# Patient Record
Sex: MALE | Race: WHITE | NOT HISPANIC OR LATINO | Employment: OTHER | URBAN - METROPOLITAN AREA
[De-identification: names, ages, dates, MRNs, and addresses within clinical notes are randomized per-mention and may not be internally consistent; named-entity substitution may affect disease eponyms.]

---

## 2017-09-25 ENCOUNTER — OFFICE VISIT (OUTPATIENT)
Dept: FAMILY MEDICINE | Facility: CLINIC | Age: 82
End: 2017-09-25
Payer: COMMERCIAL

## 2017-09-25 ENCOUNTER — CARE COORDINATION (OUTPATIENT)
Dept: CARE COORDINATION | Facility: CLINIC | Age: 82
End: 2017-09-25

## 2017-09-25 VITALS
SYSTOLIC BLOOD PRESSURE: 126 MMHG | WEIGHT: 157.1 LBS | BODY MASS INDEX: 23.27 KG/M2 | HEIGHT: 69 IN | HEART RATE: 60 BPM | DIASTOLIC BLOOD PRESSURE: 90 MMHG | TEMPERATURE: 96.7 F | OXYGEN SATURATION: 97 %

## 2017-09-25 DIAGNOSIS — Z00.01 ENCOUNTER FOR ROUTINE ADULT MEDICAL EXAM WITH ABNORMAL FINDINGS: Primary | ICD-10-CM

## 2017-09-25 DIAGNOSIS — Z23 NEED FOR PROPHYLACTIC VACCINATION AND INOCULATION AGAINST INFLUENZA: ICD-10-CM

## 2017-09-25 DIAGNOSIS — I10 HYPERTENSION GOAL BP (BLOOD PRESSURE) < 140/90: ICD-10-CM

## 2017-09-25 DIAGNOSIS — F03.B0 MODERATE DEMENTIA WITHOUT BEHAVIORAL DISTURBANCE (H): ICD-10-CM

## 2017-09-25 PROCEDURE — 90662 IIV NO PRSV INCREASED AG IM: CPT | Performed by: FAMILY MEDICINE

## 2017-09-25 PROCEDURE — 99397 PER PM REEVAL EST PAT 65+ YR: CPT | Mod: 25 | Performed by: FAMILY MEDICINE

## 2017-09-25 PROCEDURE — 99213 OFFICE O/P EST LOW 20 MIN: CPT | Mod: 25 | Performed by: FAMILY MEDICINE

## 2017-09-25 PROCEDURE — 36415 COLL VENOUS BLD VENIPUNCTURE: CPT | Performed by: FAMILY MEDICINE

## 2017-09-25 PROCEDURE — G0008 ADMIN INFLUENZA VIRUS VAC: HCPCS | Performed by: FAMILY MEDICINE

## 2017-09-25 PROCEDURE — 80048 BASIC METABOLIC PNL TOTAL CA: CPT | Performed by: FAMILY MEDICINE

## 2017-09-25 RX ORDER — LOSARTAN POTASSIUM 50 MG/1
50 TABLET ORAL DAILY
Qty: 90 TABLET | Refills: 3 | Status: SHIPPED | OUTPATIENT
Start: 2017-09-25 | End: 2018-09-19

## 2017-09-25 RX ORDER — CHLORTHALIDONE 25 MG/1
12.5 TABLET ORAL DAILY
Qty: 45 TABLET | Refills: 3 | Status: SHIPPED | OUTPATIENT
Start: 2017-09-25 | End: 2018-09-19

## 2017-09-25 RX ORDER — MEMANTINE HYDROCHLORIDE 5 MG/1
5 TABLET ORAL 2 TIMES DAILY
Qty: 180 TABLET | Refills: 3 | Status: SHIPPED | OUTPATIENT
Start: 2017-09-25 | End: 2018-09-19

## 2017-09-25 NOTE — PATIENT INSTRUCTIONS
-Start on the namenda twice daily if possible to help reduce the memory loss.    Preventive Health Recommendations:   Male Ages 65 and over    Yearly exam:             See your health care provider every year in order to  o   Review health changes.   o   Discuss preventive care.    o   Review your medicines if your doctor has prescribed any.    Talk with your health care provider about whether you should have a test to screen for prostate cancer (PSA).    Every 3 years, have a diabetes test (fasting glucose). If you are at risk for diabetes, you should have this test more often.    Every 5 years, have a cholesterol test. Have this test more often if you are at risk for high cholesterol or heart disease.     Every 10 years, have a colonoscopy. Or, have a yearly FIT test (stool test). These exams will check for colon cancer.    Talk to with your health care provider about screening for Abdominal Aortic Aneurysm if you have a family history of AAA or have a history of smoking.    Shots:     Get a flu shot each year.     Get a tetanus shot every 10 years.     Talk to your doctor about your pneumonia vaccines. There are now two you should receive - Pneumovax (PPSV 23) and Prevnar (PCV 13).     Talk to your doctor about a shingles vaccine.     Talk to your doctor about the hepatitis B vaccine.  Nutrition:     Eat at least 5 servings of fruits and vegetables each day.     Eat whole-grain bread, whole-wheat pasta and brown rice instead of white grains and rice.     Talk to your provider about Calcium and Vitamin D.   Lifestyle    Exercise for at least 150 minutes a week (30 minutes a day, 5 days a week). This will help you control your weight and prevent disease.     Limit alcohol to one drink per day.     No smoking.     Wear sunscreen to prevent skin cancer.     See your dentist every six months for an exam and cleaning.     See your eye doctor every 1 to 2 years to screen for conditions such as glaucoma, macular  degeneration, cataracts, etc

## 2017-09-25 NOTE — PROGRESS NOTES
Clinic Care Coordination Contact  [facility]/Voicemail    Referral Source: PCP  Clinical Data: Care Coordinator Outreach  Outreach attempted x 1.  Left message on voicemail with call back information and requested return call.  Plan:  Care Coordinator will try to reach patient again in 3-5 business days.  SARAI Najera    Care Coordinator  [facility], [facility], and Women's   [phone]

## 2017-09-25 NOTE — LETTER
October 2, 2017      Wilian Vallejo  [address] [address]      Dear ,    We are writing to inform you of your test results. Your test results fall within the expected range(s) or remain unchanged from previous results.     Resulted Orders   Basic metabolic panel   Result Value Ref Range    Sodium 139 133 - 144 mmol/L    Potassium 4.2 3.4 - 5.3 mmol/L    Chloride 105 94 - 109 mmol/L    Carbon Dioxide 28 20 - 32 mmol/L    Anion Gap 6 3 - 14 mmol/L    Glucose 93 70 - 99 mg/dL    Urea Nitrogen 19 7 - 30 mg/dL    Creatinine 1.04 0.66 - 1.25 mg/dL    GFR Estimate 68 >60 mL/min/1.7m2      Comment:      Non  GFR Calc    GFR Estimate If Black 82 >60 mL/min/1.7m2      Comment:       GFR Calc    Calcium 8.8 8.5 - 10.1 mg/dL       If you have any questions or concerns, please call the clinic at the number listed above.       Sincerely,        Nixon Ndiaye MD

## 2017-09-25 NOTE — PROGRESS NOTES
SUBJECTIVE:   CC: Wilian Vallejo is an 84 year old male who presents for preventative health visit.     Patient says that he is doing well and has no concerns. He goes on several walks a day, and has been getting regular exercise through walks and swinging a golf club. His memory losses continue to progress, and he cannot remember whether he continues to play music or his day to day tasks.     Patient has not been having regular eye exams, but reports no changes in his vision.    Healthy Habits:    Do you get at least three servings of calcium containing foods daily (dairy, green leafy vegetables, etc.)? yes    Amount of exercise or daily activities, outside of work: 7 day(s) per week    Problems taking medications regularly No    Medication side effects: No    Have you had an eye exam in the past two years? no    Do you see a dentist twice per year? yes    Do you have sleep apnea, excessive snoring or daytime drowsiness?no    Questions/Concerns: Senior program- wondering if there is any paper work to fill out for more assistance.       Today's PHQ-2 Score:   PHQ-2 ( 1999 Pf[employer]) 9/25/2017 9/7/2016   Q1: Little interest or pleasure in doing things 0 0   Q2: Feeling down, depressed or hopeless 0 0   PHQ-2 Score 0 0         Abuse: Current or Past(Physical, Sexual or Emotional)- No  Do you feel safe in your environment - Yes  Social History   Substance Use Topics     Smoking status: Never Smoker     Smokeless tobacco: Never Used     Alcohol use Yes      Comment: 1-2/wk/no alcohol 24 hrs prior to arrival     The patient does not drink >3 drinks per day nor >7 drinks per week.    Last PSA:   PSA   Date Value Ref Range Status   04/29/2009 7.0 ng/mL        Reviewed orders with patient. Reviewed health maintenance and updated orders accordingly - Yes  BP Readings from Last 3 Encounters:   09/25/17 126/90   09/07/16 120/82   09/04/15 129/87    Wt Readings from Last 3 Encounters:   09/25/17 157 lb 1.6 oz (71.3 kg)    09/07/16 156 lb 4.8 oz (70.9 kg)   09/04/15 155 lb 1.6 oz (70.4 kg)        Patient Active Problem List   Diagnosis     Advanced directives, counseling/discussion     Tinnitus     CARDIOVASCULAR SCREENING; LDL GOAL LESS THAN 130     Hernia     HL (hearing loss)     Urinary retention     BPH (benign prostatic hypertrophy)     BPH (benign prostatic hyperplasia)     S/P TURP     Essential hypertension with goal blood pressure less than 140/90     Family history of Parkinson's disease     Moderate dementia without behavioral disturbance     Past Surgical History:   Procedure Laterality Date     COLONOSCOPY       CYSTOSCOPY, FULGURATE BLEEDERS, EVACUATE CLOT(S), COMBINED  7/2/2013    Procedure: COMBINED CYSTOSCOPY, FULGURATE BLEEDERS, EVACUATE CLOT(S);  Ridgid CYSTOSCOPY, FULGURATE Bladder, Clot Evacuation;  Surgeon: Anderson Goins MD;  [facility]     CYSTOSCOPY, TRANSURETHRAL RESECTION (TUR) PROSTATE, COMBINED  7/2/2013    Procedure: COMBINED CYSTOSCOPY, TRANSURETHRAL RESECTION (TUR) PROSTATE;  Transurethral Resection of Prostate   ;  Surgeon: Allan De La Rosa MD;  [facility]     EYE SURGERY       LAPAROSCOPIC HERNIORRHAPHY INGUINAL BILATERAL  4/19/2013    Procedure: LAPAROSCOPIC HERNIORRHAPHY INGUINAL BILATERAL;  Laparosocpic Bilateral Inguinal Hernia Repair With Mesh;  Surgeon: Jon Ames MD;  Location:  OR       Social History   Substance Use Topics     Smoking status: Never Smoker     Smokeless tobacco: Never Used     Alcohol use Yes      Comment: 1-2/wk/no alcohol 24 hrs prior to arrival     Family History   Problem Relation Age of Onset     Hypertension Mother      HEART DISEASE Mother      Cancer - colorectal Maternal Grandmother      HEART DISEASE Brother      Respiratory Brother          Current Outpatient Prescriptions   Medication Sig Dispense Refill     losartan (COZAAR) 50 MG tablet Take 1 tablet (50 mg) by mouth daily 90 tablet 3     chlorthalidone (HYGROTON) 25 MG  "tablet Take 0.5 tablets (12.5 mg) by mouth daily 45 tablet 3     memantine (NAMENDA) 5 MG tablet Take 1 tablet (5 mg) by mouth 2 times daily 180 tablet 3     FLUZONE HIGH-DOSE injection   0     Allergies   Allergen Reactions     Hydrocodone W/Acetaminophen [Hydrocodone-Acetaminophen]            Reviewed and updated as needed this visit by clinical staff  Tobacco  Allergies  Meds  Med Hx  Surg Hx  Fam Hx  Soc Hx        Reviewed and updated as needed this visit by Provider              ROS:  Positive for memory loss.    Denies headache, insomnia, chest pain, shortness of breath, cough, heartburn, bowel issues, bladder issues, neck pain, back pain, hip pain, knee pain, ankle pain, or foot pain. Remainder of ROS is negative unless otherwise noted above or in HPI.    This document serves as a record of the services and decisions personally performed and made by Nixon Ndiaye MD. It was created on his behalf by Ivan Cerda, a trained medical scribe. The creation of this document is based the provider's statements to the medical scribe.  Ivan Cerda 1:13 PM September 25, 2017    OBJECTIVE:   /90  Pulse 60  Temp 96.7  F (35.9  C) (Oral)  Ht 5' 8.5\" (1.74 m)  Wt 157 lb 1.6 oz (71.3 kg)  SpO2 97%  BMI 23.54 kg/m2  EXAM:  GENERAL: healthy, alert and no distress  EYES: Eyes grossly normal to inspection, PERRL and conjunctivae and sclerae normal. EOMI.  HENT: ear canals and TM's normal, nose and mouth without ulcers or lesions  NECK: no adenopathy, no asymmetry, masses, or scars and thyroid normal to palpation  RESP: lungs clear to auscultation - no rales, rhonchi or wheezes  CV: regular rate and rhythm, normal S1 S2, no S3 or S4, no murmur, click or rub, no peripheral edema and peripheral pulses strong  ABDOMEN: soft, nontender, no hepatosplenomegaly, no masses and bowel sounds normal   (male): normal male genitalia without lesions or urethral discharge, no hernia  MS: no gross " musculoskeletal defects noted, no edema. Calves nontender.  SKIN: no suspicious lesions or rashes  NEURO: Normal strength and tone, mentation intact and speech normal, forgetful. Knee reflexes normal. Fine tremors of both hands.  PSYCH: mentation appears normal, affect normal/bright  LYMPH: no cervical, supraclavicular, axillary, or inguinal adenopathy    ASSESSMENT/PLAN:   (Z00.01) Encounter for routine adult medical exam with abnormal findings  (primary encounter diagnosis)  Comment: Routine physical and labs completed today.  Plan: Follow up with results from lab.    (I10) Hypertension goal BP (blood pressure) < 140/90  Comment: At goal. Controlled on losartan and chlorthalidone. Labs completed today.  Plan: losartan (COZAAR) 50 MG tablet, chlorthalidone         (HYGROTON) 25 MG tablet, Basic metabolic panel        Continue on current medications. Follow up with results from lab.    Encounter Diagnoses   Name Primary?     Encounter for routine adult medical exam with abnormal findings Yes     Hypertension goal BP (blood pressure) < 140/90      Need for prophylactic vaccination and inoculation against influenza      Moderate dementia without behavioral disturbance      Comment: Progressing. Prescription given for memantine. Referral given for care coordination.  Plan: CARE COORDINATION REFERRAL, memantine (NAMENDA)        5 MG tablet        Start on memantine twice daily. Follow up with care coordination.    Patient Instructions   -Start on the namenda twice daily if possible to help reduce the memory loss.    Preventive Health Recommendations:   Male Ages 65 and over    Yearly exam:             See your health care provider every year in order to  o   Review health changes.   o   Discuss preventive care.    o   Review your medicines if your doctor has prescribed any.    Talk with your health care provider about whether you should have a test to screen for prostate cancer (PSA).    Every 3 years, have a diabetes  "test (fasting glucose). If you are at risk for diabetes, you should have this test more often.    Every 5 years, have a cholesterol test. Have this test more often if you are at risk for high cholesterol or heart disease.     Every 10 years, have a colonoscopy. Or, have a yearly FIT test (stool test). These exams will check for colon cancer.    Talk to with your health care provider about screening for Abdominal Aortic Aneurysm if you have a family history of AAA or have a history of smoking.    Shots:     Get a flu shot each year.     Get a tetanus shot every 10 years.     Talk to your doctor about your pneumonia vaccines. There are now two you should receive - Pneumovax (PPSV 23) and Prevnar (PCV 13).     Talk to your doctor about a shingles vaccine.     Talk to your doctor about the hepatitis B vaccine.  Nutrition:     Eat at least 5 servings of fruits and vegetables each day.     Eat whole-grain bread, whole-wheat pasta and brown rice instead of white grains and rice.     Talk to your provider about Calcium and Vitamin D.   Lifestyle    Exercise for at least 150 minutes a week (30 minutes a day, 5 days a week). This will help you control your weight and prevent disease.     Limit alcohol to one drink per day.     No smoking.     Wear sunscreen to prevent skin cancer.     See your dentist every six months for an exam and cleaning.     See your eye doctor every 1 to 2 years to screen for conditions such as glaucoma, macular degeneration, cataracts, etc       COUNSELING:  Reviewed preventive health counseling, as reflected in patient instructions     reports that he has never smoked. He has never used smokeless tobacco.  Estimated body mass index is 23.54 kg/(m^2) as calculated from the following:    Height as of this encounter: 5' 8.5\" (1.74 m).    Weight as of this encounter: 157 lb 1.6 oz (71.3 kg).       The information in this document, created by the medical scribe for me, accurately reflects the services I " personally performed and the decisions made by me. I have reviewed and approved this document for accuracy prior to leaving the patient care area.   Nixon Ndiaye MD 1:12 PM September 25, 2017  [facility]

## 2017-09-25 NOTE — NURSING NOTE
"Chief Complaint   Patient presents with     Physical       Initial /90  Pulse 60  Temp 96.7  F (35.9  C) (Oral)  Ht 5' 8.5\" (1.74 m)  Wt 157 lb 1.6 oz (71.3 kg)  SpO2 97%  BMI 23.54 kg/m2 Estimated body mass index is 23.54 kg/(m^2) as calculated from the following:    Height as of this encounter: 5' 8.5\" (1.74 m).    Weight as of this encounter: 157 lb 1.6 oz (71.3 kg).  Medication Reconciliation: complete     Aleks Thakkar MA      "

## 2017-09-25 NOTE — MR AVS SNAPSHOT
After Visit Summary   9/25/2017    Wilian Vallejo    MRN: [id number]           Patient Information     Date Of Birth          8/13/1933        Visit Information        Provider Department      9/25/2017 1:30 PM Nixon Ndiaye MD [facility]        Today's Diagnoses     Encounter for routine adult medical exam with abnormal findings    -  1    Hypertension goal BP (blood pressure) < 140/90        Mild cognitive impairment with memory loss          Care Instructions    -Start on the namenda twice daily if possible to help reduce the memory loss.    Preventive Health Recommendations:   Male Ages 65 and over    Yearly exam:             See your health care provider every year in order to  o   Review health changes.   o   Discuss preventive care.    o   Review your medicines if your doctor has prescribed any.    Talk with your health care provider about whether you should have a test to screen for prostate cancer (PSA).    Every 3 years, have a diabetes test (fasting glucose). If you are at risk for diabetes, you should have this test more often.    Every 5 years, have a cholesterol test. Have this test more often if you are at risk for high cholesterol or heart disease.     Every 10 years, have a colonoscopy. Or, have a yearly FIT test (stool test). These exams will check for colon cancer.    Talk to with your health care provider about screening for Abdominal Aortic Aneurysm if you have a family history of AAA or have a history of smoking.    Shots:     Get a flu shot each year.     Get a tetanus shot every 10 years.     Talk to your doctor about your pneumonia vaccines. There are now two you should receive - Pneumovax (PPSV 23) and Prevnar (PCV 13).     Talk to your doctor about a shingles vaccine.     Talk to your doctor about the hepatitis B vaccine.  Nutrition:     Eat at least 5 servings of fruits and vegetables each day.     Eat whole-grain bread, whole-wheat pasta and  brown rice instead of white grains and rice.     Talk to your provider about Calcium and Vitamin D.   Lifestyle    Exercise for at least 150 minutes a week (30 minutes a day, 5 days a week). This will help you control your weight and prevent disease.     Limit alcohol to one drink per day.     No smoking.     Wear sunscreen to prevent skin cancer.     See your dentist every six months for an exam and cleaning.     See your eye doctor every 1 to 2 years to screen for conditions such as glaucoma, macular degeneration, cataracts, etc           Follow-ups after your visit        Additional Services     CARE COORDINATION REFERRAL       Services are provided by a Care Coordinator for people with complex needs such as: medical, social, or financial troubles.  The Care Coordinator works with the patient and their Primary Care Provider to determine health goals, obtain resources, achieve outcomes, and develop care plans that help coordinate the patient's care.     Reason for Referral: Caregiver Concerns and Concerns with ADLs/IADLs    Provide additional details for Care Coordination to best meet the patient's current needs:     Clinical Staff have discussed the Care Coordination Referral with the patient and/or caregiver: yes                  Who to contact     If you have questions or need follow up information about today's clinic visit or your schedule please contact [facility] directly at [phone].  Normal or non-critical lab and imaging results will be communicated to you by MyChart, letter or phone within 4 business days after the clinic has received the results. If you do not hear from us within 7 days, please contact the clinic through MyChart or phone. If you have a critical or abnormal lab result, we will notify you by phone as soon as possible.  Submit refill requests through [employer] or call your pharmacy and they will forward the refill request to us. Please allow 3 business days for your  "refill to be completed.          Additional Information About Your Visit        [employer] Information     [employer] lets you send messages to your doctor, view your test results, renew your prescriptions, schedule appointments and more. To sign up, go to [email or web address] . Click on \"Log in\" on the left side of the screen, which will take you to the Welcome page. Then click on \"Sign up Now\" on the right side of the page.     You will be asked to enter the access code listed below, as well as some personal information. Please follow the directions to create your username and password.     Your access code is: [email or web address]  Expires: 2017  1:48 PM     Your access code will  in 90 days. If you need help or a new code, please call your [place] clinic or [phone].        Care EveryWhere ID     This is your Care EveryWhere ID. This could be used by other organizations to access your [facility] medical records  [id number]        Your Vitals Were     Pulse Temperature Height Pulse Oximetry BMI (Body Mass Index)       60 96.7  F (35.9  C) (Oral) 5' 8.5\" (1.74 m) 97% 23.54 kg/m2        Blood Pressure from Last 3 Encounters:   17 126/90   16 120/82   09/04/15 129/87    Weight from Last 3 Encounters:   17 157 lb 1.6 oz (71.3 kg)   16 156 lb 4.8 oz (70.9 kg)   09/04/15 155 lb 1.6 oz (70.4 kg)              We Performed the Following     Basic metabolic panel     CARE COORDINATION REFERRAL          Today's Medication Changes          These changes are accurate as of: 17  1:48 PM.  If you have any questions, ask your nurse or doctor.               Start taking these medicines.        Dose/Directions    memantine 5 MG tablet   Commonly known as:  NAMENDA   Used for:  Mild cognitive impairment with memory loss   Started by:  Nixon Ndiaye MD        Dose:  5 mg   Take 1 tablet (5 mg) by mouth 2 times daily   Quantity:  180 tablet   Refills:  3            Where to get your " medicines      These medications were sent to [facility] - [place], [place] - [address]  [address], [facility] [address]     Phone:  [phone]     chlorthalidone 25 MG tablet    losartan 50 MG tablet    memantine 5 MG tablet                Primary Care Provider Office Phone # Fax #    Nixon Ndiaye [phone]       [address]  [facility] [address]        Equal Access to Services     MAXIMILIAN PRADHAN : Hadii aad ku hadasho Soomaali, waaxda luqadaha, qaybta kaalmada adeegyada, waxay idiin hayaan adeeg kharaprema [place]itting[place] . So [facility] [phone].    ATENCIÓN: Si andrela espmina, tiene a naidu disposición servicios gratuitos de asistencia lingüística. [facility] [phone].    We comply with applicable federal civil rights laws and [place] laws. We do not discriminate on the basis of race, color, national origin, age, disability sex, sexual orientation or gender identity.            Thank you!     Thank you for choosing [facility]  for your care. Our goal is always to provide you with excellent care. Hearing back from our patients is one way we can continue to improve our services. Please take a few minutes to complete the written survey that you may receive in the mail after your visit with us. Thank you!             Your Updated Medication List - Protect others around you: Learn how to safely use, store and throw away your medicines at www.disposemymeds.org.          This list is accurate as of: 9/25/17  1:48 PM.  Always use your most recent med list.                   Brand Name Dispense Instructions for use Diagnosis    chlorthalidone 25 MG tablet    HYGROTON    45 tablet    Take 0.5 tablets (12.5 mg) by mouth daily    Hypertension goal BP (blood pressure) < 140/90       FLUZONE HIGH-DOSE injection   Generic drug:  Influenza Vac Split High-Dose/High-Antigen           losartan 50 MG tablet    COZAAR    90 tablet    Take 1 tablet (50 mg) by mouth  daily    Hypertension goal BP (blood pressure) < 140/90       memantine 5 MG tablet    NAMENDA    180 tablet    Take 1 tablet (5 mg) by mouth 2 times daily    Mild cognitive impairment with memory loss

## 2017-09-26 PROBLEM — F03.B0 MODERATE DEMENTIA WITHOUT BEHAVIORAL DISTURBANCE (H): Status: ACTIVE | Noted: 2017-09-26

## 2017-09-26 LAB
ANION GAP SERPL CALCULATED.3IONS-SCNC: 6 MMOL/L (ref 3–14)
BUN SERPL-MCNC: 19 MG/DL (ref 7–30)
CALCIUM SERPL-MCNC: 8.8 MG/DL (ref 8.5–10.1)
CHLORIDE SERPL-SCNC: 105 MMOL/L (ref 94–109)
CO2 SERPL-SCNC: 28 MMOL/L (ref 20–32)
CREAT SERPL-MCNC: 1.04 MG/DL (ref 0.66–1.25)
GFR SERPL CREATININE-BSD FRML MDRD: 68 ML/MIN/1.7M2
GLUCOSE SERPL-MCNC: 93 MG/DL (ref 70–99)
POTASSIUM SERPL-SCNC: 4.2 MMOL/L (ref 3.4–5.3)
SODIUM SERPL-SCNC: 139 MMOL/L (ref 133–144)

## 2017-09-29 ENCOUNTER — CARE COORDINATION (OUTPATIENT)
Dept: CARE COORDINATION | Facility: CLINIC | Age: 82
End: 2017-09-29

## 2017-09-29 NOTE — PROGRESS NOTES
Clinic Care Coordination Contact  [facility]/Voicemail    Referral Source: PCP  Clinical Data: Care Coordinator Outreach  Outreach attempted x 1.  Left message on voicemail with call back information and requested return call.  Plan: Care Coordinator will try to reach patient again in 3-5 business days.  SARAI Najera    Care Coordinator  [facility], [facility] Primary Care, and Women's   [phone]

## 2017-10-03 ENCOUNTER — NURSE TRIAGE (OUTPATIENT)
Dept: NURSING | Facility: CLINIC | Age: 82
End: 2017-10-03

## 2017-10-06 ENCOUNTER — CARE COORDINATION (OUTPATIENT)
Dept: CARE COORDINATION | Facility: CLINIC | Age: 82
End: 2017-10-06

## 2017-10-06 NOTE — PROGRESS NOTES
Clinic Care Coordination Contact  [facility]/Voicemail    Referral Source: PCP  Clinical Data: Care Coordinator Outreach  Outreach attempted x 1.  Left message on voicemail with call back information and requested return call.  Plan: Care Coordinator will mail out care coordination introduction letter with care coordinator contact information and explanation of care coordination services. Care Coordinator will do no further outreaches at this time.  SARAI Najera    Care Coordinator  [facility], [facility] Primary Care, and Women's   [phone]

## 2017-10-06 NOTE — LETTER
Health Care Home - Access Care Plan    About Me  Patient Name:  Wilian Mcdaniel    YOB: 1933  Age:                            84 year old   Christophe MRN:         [id number] Telephone Information:     Home Phone [phone]   Mobile Not on file.       Address:    [address] [place] [address] Email address:  No e-mail address on record      Emergency Contact(s)  Name Relationship Lgl Grd Work Phone Home Phone Mobile Phone   1. LATOSHA MCDANIEL Spouse   [phone]    2. RIVAS MCDANIEL Son    [phone]             Health Maintenance:      My Access Plan  Medical Emergency 911   Questions or concerns during clinic hours Primary Clinic Line, I will call the clinic directly: Primary Clinic: [facility]- [phone]   24 Hour Appointment Line [phone] or  [phone])  (toll free)   24 Hour Nurse Line [phone] (toll free)   Questions or concerns outside clinic hours 24 Hour Appointment Line, I will call the after-hours on-call line:   [facility] [phone] or [phone] ([phone]) (toll-free)   Preferred Urgent Care     Preferred Hospital Preferred Hospital: [facility]  [phone]   Preferred Pharmacy [facility] [address] [facility]     Behavioral Health Crisis Line The National Suicide Prevention Lifeline at 1-482.556.9844 or 911     My Care Team Members  Patient Care Team       Relationship Specialty Notifications Start End    Nixon Ndiaye MD PCP - General Family Practice  3/24/13     Phone: [phone] Fax: [phone]         [address] [address]    Jon Ames MD Referring Physician Surgery  4/24/13     Phone: [phone] Fax: [phone]         [address] [facility] [address]    Radha Mehta   Admissions 9/25/17     Phone: [phone]             My  Medical and Care Information  Problem List   Patient Active Problem List   Diagnosis     Advanced directives, counseling/discussion     Tinnitus     CARDIOVASCULAR SCREENING; LDL GOAL LESS THAN 130     Hernia     HL (hearing loss)     Urinary retention     BPH (benign prostatic hypertrophy)     BPH (benign prostatic hyperplasia)     S/P TURP     Essential hypertension with goal blood pressure less than 140/90     Family history of Parkinson's disease     Moderate dementia without behavioral disturbance      Current Medications and Allergies:  See printed Medication Report

## 2017-10-24 ENCOUNTER — TRANSFERRED RECORDS (OUTPATIENT)
Dept: HEALTH INFORMATION MANAGEMENT | Facility: CLINIC | Age: 82
End: 2017-10-24

## 2018-05-08 ENCOUNTER — ALLIED HEALTH/NURSE VISIT (OUTPATIENT)
Dept: NURSING | Facility: CLINIC | Age: 83
End: 2018-05-08
Payer: COMMERCIAL

## 2018-05-08 VITALS — SYSTOLIC BLOOD PRESSURE: 130 MMHG | DIASTOLIC BLOOD PRESSURE: 78 MMHG

## 2018-05-08 DIAGNOSIS — Z01.30 BP CHECK: Primary | ICD-10-CM

## 2018-05-08 PROCEDURE — 99207 ZZC NO CHARGE NURSE ONLY: CPT

## 2018-05-08 NOTE — MR AVS SNAPSHOT
"              After Visit Summary   5/8/2018    Wilian Vallejo    MRN: [id number]           Patient Information     Date Of Birth          8/13/1933        Visit Information        Provider Department      5/8/2018 11:30 AM MEHUL IZAGUIRRE RN [facility] [place] [facility]        Today's Diagnoses     BP check    -  1       Follow-ups after your visit        Your next 10 appointments already scheduled     May 08, 2018 11:30 AM CDT   Nurse Only with RD ZINA RN   [facility] ([facility])    [address] [place] [address] [phone]              Who to contact     If you have questions or need follow up information about today's clinic visit or your schedule please contact [facility] directly at [phone].  Normal or non-critical lab and imaging results will be communicated to you by [employer], letter or phone within 4 business days after the clinic has received the results. If you do not hear from us within 7 days, please contact the clinic through MyChart or phone. If you have a critical or abnormal lab result, we will notify you by phone as soon as possible.  Submit refill requests through Loggly or call your pharmacy and they will forward the refill request to us. Please allow 3 business days for your refill to be completed.          Additional Information About Your Visit        [employer] Information     Loggly lets you send messages to your doctor, view your test results, renew your prescriptions, schedule appointments and more. To sign up, go to [email or web address] . Click on \"Log in\" on the left side of the screen, which will take you to the Welcome page. Then click on \"Sign up Now\" on the right side of the page.     You will be asked to enter the access code listed below, as well as some personal information. Please follow the directions to create your username and password.     Your access code is: [email or web address]  Expires: 8/6/2018 " 11:23 AM     Your access code will  in 90 days. If you need help or a new code, please call your [place] clinic or [phone].        Care EveryWhere ID     This is your Care EveryWhere ID. This could be used by other organizations to access your [place] medical records  [id number]         Blood Pressure from Last 3 Encounters:   18 130/78   17 126/90   16 120/82    Weight from Last 3 Encounters:   17 157 lb 1.6 oz (71.3 kg)   16 156 lb 4.8 oz (70.9 kg)   09/04/15 155 lb 1.6 oz (70.4 kg)              Today, you had the following     No orders found for display       Primary Care Provider Office Phone # Fax #    Nixon Ndiaye [phone]       [address] [facility] [address]  [facility] [address]        Equal Access to Services     [facility]: Hadii aad ku hadasho Soomaali, waaxda luqadaha, qaybta kaalmada adeegyada, waxay idiin hayaan magaly whittington . So [facility] [phone].    ATENCIÓN: Si habla español, tiene a naidu disposición servicios gratuitos de asistencia lingüística. Llame al [phone].    We comply with applicable federal civil rights laws and [place] laws. We do not discriminate on the basis of race, color, national origin, age, disability, sex, sexual orientation, or gender identity.            Thank you!     Thank you for choosing [facility]  for your care. Our goal is always to provide you with excellent care. Hearing back from our patients is one way we can continue to improve our services. Please take a few minutes to complete the written survey that you may receive in the mail after your visit with us. Thank you!             Your Updated Medication List - Protect others around you: Learn how to safely use, store and throw away your medicines at www.disposemymeds.org.          This list is accurate as of 18 11:23 AM.  Always use your most recent med list.                   Brand Name Dispense Instructions for  use Diagnosis    chlorthalidone 25 MG tablet    HYGROTON    45 tablet    Take 0.5 tablets (12.5 mg) by mouth daily    Hypertension goal BP (blood pressure) < 140/90       FLUZONE HIGH-DOSE injection   Generic drug:  Influenza Vac Split High-Dose/High-Antigen           losartan 50 MG tablet    COZAAR    90 tablet    Take 1 tablet (50 mg) by mouth daily    Hypertension goal BP (blood pressure) < 140/90       memantine 5 MG tablet    NAMENDA    180 tablet    Take 1 tablet (5 mg) by mouth 2 times daily

## 2018-05-08 NOTE — PROGRESS NOTES
Patient came in for a BP check and to give updated Health Care Directives for him and his wife.   These were faxed.    BP was 130/78, patient had no concerns.    Anayeli Paulino MA  May 8, 2018 11:22 AM

## 2018-09-19 DIAGNOSIS — I10 HYPERTENSION GOAL BP (BLOOD PRESSURE) < 140/90: ICD-10-CM

## 2018-09-19 DIAGNOSIS — F03.B0 MODERATE DEMENTIA WITHOUT BEHAVIORAL DISTURBANCE (H): Primary | ICD-10-CM

## 2018-09-19 RX ORDER — CHLORTHALIDONE 25 MG/1
TABLET ORAL
Qty: 45 TABLET | Refills: 0 | Status: SHIPPED | OUTPATIENT
Start: 2018-09-19 | End: 2018-12-15

## 2018-09-19 RX ORDER — LOSARTAN POTASSIUM 50 MG/1
50 TABLET ORAL DAILY
Qty: 90 TABLET | Refills: 0 | Status: SHIPPED | OUTPATIENT
Start: 2018-09-19 | End: 2018-12-15

## 2018-09-19 RX ORDER — MEMANTINE HYDROCHLORIDE 5 MG/1
TABLET ORAL
Qty: 180 TABLET | Refills: 0 | Status: SHIPPED | OUTPATIENT
Start: 2018-09-19 | End: 2018-12-15

## 2018-09-19 NOTE — TELEPHONE ENCOUNTER
"Requested Prescriptions   Pending Prescriptions Disp Refills     memantine (NAMENDA) 5 MG tablet [Pharmacy Med Name: MEMANTINE HCL 5 MG TABLET 5 TAB] 180 tablet 3    Last Written Prescription Date:  9/25/17  Last Fill Quantity: 180,  # refills: 3   Last office visit: 9/25/2017 with prescribing provider:     Future Office Visit:   Next 5 appointments (look out 90 days)     Sep 26, 2018 11:30 AM CDT   Office Visit with Nixon Ndiaye MD   [address] [address] [phone]                Sig: TAKE 1 TABLET BY MOUTH 2 TIMES A DAY.    Miscellaneous Dementia Agents Passed    9/19/2018 11:33 AM       Passed - Recent (12 mo) or future (30 days) visit within the authorizing provider's specialty    Patient had office visit in the last 12 months or has a visit in the next 30 days with authorizing provider or within the authorizing provider's specialty.  See \"Patient Info\" tab in inbasket, or \"Choose Columns\" in Meds & Orders section of the refill encounter.           Passed - Patient is 18 years of age or older        chlorthalidone (HYGROTON) 25 MG tablet [Pharmacy Med Name: CHLORTHALIDONE 25 MG TAB 25 TAB] 45 tablet 3    Last Written Prescription Date:  9/25/17  Last Fill Quantity: 45,  # refills: 3   Last office visit: 9/25/2017 with prescribing provider:     Future Office Visit:   Next 5 appointments (look out 90 days)     Sep 26, 2018 11:30 AM CDT   Office Visit with Nixon Ndiaye MD   [facility] ([facility])    [address] [place] [address] [phone]                  Sig: TAKE 1/2 TABLET BY MOUTH ONCE DAILY.    Diuretics (Including Combos) Protocol Passed    9/19/2018 11:33 AM       Passed - Blood pressure under 140/90 in past 12 months    BP Readings from Last 3 Encounters:   05/08/18 130/78   09/25/17 126/90   09/07/16 120/82                " "Passed - Recent (12 mo) or future (30 days) visit within the authorizing provider's specialty    Patient had office visit in the last 12 months or has a visit in the next 30 days with authorizing provider or within the authorizing provider's specialty.  See \"Patient Info\" tab in inbasket, or \"Choose Columns\" in Meds & Orders section of the refill encounter.           Passed - Patient is age 18 or older       Passed - Normal serum creatinine on file in past 12 months    Recent Labs   Lab Test  09/25/17   1417   CR  1.04             Passed - Normal serum potassium on file in past 12 months    Recent Labs   Lab Test  09/25/17   1417   POTASSIUM  4.2                   Passed - Normal serum sodium on file in past 12 months    Recent Labs   Lab Test  09/25/17   1417   NA  139              "

## 2018-09-19 NOTE — TELEPHONE ENCOUNTER
Prescription approved per [facility] Refill Protocol.    Tiarra Reagan RN   [facility]

## 2018-09-19 NOTE — TELEPHONE ENCOUNTER
Prescription approved per [facility] Refill Protocol.    Pt due to be seen, he made appointment to be seen    Tiarra Reagan RN   [facility]

## 2018-09-19 NOTE — TELEPHONE ENCOUNTER
"Requested Prescriptions   Pending Prescriptions Disp Refills     losartan (COZAAR) 50 MG tablet    Last Written Prescription Date:  9/25/17  Last Fill Quantity: 90,  # refills: 3   Last office visit: 9/25/2017 with prescribing provider:  9/25/17   Future Office Visit:     90 tablet 3     Sig: Take 1 tablet (50 mg) by mouth daily    Angiotensin-II Receptors Passed    9/19/2018 11:46 AM       Passed - Blood pressure under 140/90 in past 12 months    BP Readings from Last 3 Encounters:   05/08/18 130/78   09/25/17 126/90   09/07/16 120/82                Passed - Recent (12 mo) or future (30 days) visit within the authorizing provider's specialty    Patient had office visit in the last 12 months or has a visit in the next 30 days with authorizing provider or within the authorizing provider's specialty.  See \"Patient Info\" tab in inbasket, or \"Choose Columns\" in Meds & Orders section of the refill encounter.           Passed - Patient is age 18 or older       Passed - Normal serum creatinine on file in past 12 months    Recent Labs   Lab Test  09/25/17   1417   CR  1.04            Passed - Normal serum potassium on file in past 12 months    Recent Labs   Lab Test  09/25/17   1417   POTASSIUM  4.2                      "

## 2018-09-26 ENCOUNTER — OFFICE VISIT (OUTPATIENT)
Dept: FAMILY MEDICINE | Facility: CLINIC | Age: 83
End: 2018-09-26
Payer: COMMERCIAL

## 2018-09-26 VITALS
WEIGHT: 163 LBS | BODY MASS INDEX: 24.42 KG/M2 | HEART RATE: 55 BPM | TEMPERATURE: 98.2 F | SYSTOLIC BLOOD PRESSURE: 118 MMHG | DIASTOLIC BLOOD PRESSURE: 78 MMHG | OXYGEN SATURATION: 93 % | RESPIRATION RATE: 16 BRPM

## 2018-09-26 DIAGNOSIS — Z23 NEED FOR PROPHYLACTIC VACCINATION AND INOCULATION AGAINST INFLUENZA: ICD-10-CM

## 2018-09-26 DIAGNOSIS — Z00.00 ROUTINE GENERAL MEDICAL EXAMINATION AT A HEALTH CARE FACILITY: Primary | ICD-10-CM

## 2018-09-26 DIAGNOSIS — F03.B0 MODERATE DEMENTIA WITHOUT BEHAVIORAL DISTURBANCE (H): ICD-10-CM

## 2018-09-26 PROCEDURE — G0008 ADMIN INFLUENZA VIRUS VAC: HCPCS | Performed by: FAMILY MEDICINE

## 2018-09-26 PROCEDURE — 90662 IIV NO PRSV INCREASED AG IM: CPT | Performed by: FAMILY MEDICINE

## 2018-09-26 PROCEDURE — G0439 PPPS, SUBSEQ VISIT: HCPCS | Mod: 25 | Performed by: FAMILY MEDICINE

## 2018-09-26 NOTE — PATIENT INSTRUCTIONS
Check with regard to assisted living/transitional care options sooner rather than later.     Preventive Health Recommendations:   Male Ages 65 and over    Yearly exam:             See your health care provider every year in order to  o   Review health changes.   o   Discuss preventive care.    o   Review your medicines if your doctor has prescribed any.    Talk with your health care provider about whether you should have a test to screen for prostate cancer (PSA).    Every 3 years, have a diabetes test (fasting glucose). If you are at risk for diabetes, you should have this test more often.    Every 5 years, have a cholesterol test. Have this test more often if you are at risk for high cholesterol or heart disease.     Every 10 years, have a colonoscopy. Or, have a yearly FIT test (stool test). These exams will check for colon cancer.    Talk to with your health care provider about screening for Abdominal Aortic Aneurysm if you have a family history of AAA or have a history of smoking.    Shots:     Get a flu shot each year.     Get a tetanus shot every 10 years.     Talk to your doctor about your pneumonia vaccines. There are now two you should receive - Pneumovax (PPSV 23) and Prevnar (PCV 13).     Talk to your pharmacist about a shingles vaccine.     Talk to your doctor about the hepatitis B vaccine.  Nutrition:     Eat at least 5 servings of fruits and vegetables each day.     Eat whole-grain bread, whole-wheat pasta and brown rice instead of white grains and rice.     Get adequate Calcium and Vitamin D.   Lifestyle    Exercise for at least 150 minutes a week (30 minutes a day, 5 days a week). This will help you control your weight and prevent disease.     Limit alcohol to one drink per day.     No smoking.     Wear sunscreen to prevent skin cancer.     See your dentist every six months for an exam and cleaning.     See your eye doctor every 1 to 2 years to screen for conditions such as glaucoma, macular  degeneration, cataracts, etc

## 2018-09-26 NOTE — MR AVS SNAPSHOT
After Visit Summary   9/26/2018    Wilian Vallejo    MRN: [id number]           Patient Information     Date Of Birth          8/13/1933        Visit Information        Provider Department      9/26/2018 11:30 AM Nixon Ndiaye MD [facility]        Today's Diagnoses     Routine general medical examination at a health care facility    -  1    Moderate dementia without behavioral disturbance        Need for prophylactic vaccination and inoculation against influenza          Care Instructions    Check with regard to assisted living/transitional care options sooner rather than later.     Preventive Health Recommendations:   Male Ages 65 and over    Yearly exam:             See your health care provider every year in order to  o   Review health changes.   o   Discuss preventive care.    o   Review your medicines if your doctor has prescribed any.    Talk with your health care provider about whether you should have a test to screen for prostate cancer (PSA).    Every 3 years, have a diabetes test (fasting glucose). If you are at risk for diabetes, you should have this test more often.    Every 5 years, have a cholesterol test. Have this test more often if you are at risk for high cholesterol or heart disease.     Every 10 years, have a colonoscopy. Or, have a yearly FIT test (stool test). These exams will check for colon cancer.    Talk to with your health care provider about screening for Abdominal Aortic Aneurysm if you have a family history of AAA or have a history of smoking.    Shots:     Get a flu shot each year.     Get a tetanus shot every 10 years.     Talk to your doctor about your pneumonia vaccines. There are now two you should receive - Pneumovax (PPSV 23) and Prevnar (PCV 13).     Talk to your pharmacist about a shingles vaccine.     Talk to your doctor about the hepatitis B vaccine.  Nutrition:     Eat at least 5 servings of fruits and vegetables each day.     Eat  "whole-grain bread, whole-wheat pasta and brown rice instead of white grains and rice.     Get adequate Calcium and Vitamin D.   Lifestyle    Exercise for at least 150 minutes a week (30 minutes a day, 5 days a week). This will help you control your weight and prevent disease.     Limit alcohol to one drink per day.     No smoking.     Wear sunscreen to prevent skin cancer.     See your dentist every six months for an exam and cleaning.     See your eye doctor every 1 to 2 years to screen for conditions such as glaucoma, macular degeneration, cataracts, etc           Follow-ups after your visit        Who to contact     If you have questions or need follow up information about today's clinic visit or your schedule please contact [facility] directly at [phone].  Normal or non-critical lab and imaging results will be communicated to you by [employer], letter or phone within 4 business days after the clinic has received the results. If you do not hear from us within 7 days, please contact the clinic through MT[employer]orhart or phone. If you have a critical or abnormal lab result, we will notify you by phone as soon as possible.  Submit refill requests through [employer] or call your pharmacy and they will forward the refill request to us. Please allow 3 business days for your refill to be completed.          Additional Information About Your Visit        [employer] Information     [employer] lets you send messages to your doctor, view your test results, renew your prescriptions, schedule appointments and more. To sign up, go to [email or web address] . Click on \"Log in\" on the left side of the screen, which will take you to the Welcome page. Then click on \"Sign up Now\" on the right side of the page.     You will be asked to enter the access code listed below, as well as some personal information. Please follow the directions to create your username and password.     Your access code is: [email or web address]  Expires: " 2018 12:16 PM     Your access code will  in 90 days. If you need help or a new code, please call your [place] clinic or [phone].        Care EveryWhere ID     This is your Care EveryWhere ID. This could be used by other organizations to access your [place] medical records  [id number]        Your Vitals Were     Pulse Temperature Respirations Pulse Oximetry BMI (Body Mass Index)       55 98.2  F (36.8  C) (Temporal) 16 93% 24.42 kg/m2        Blood Pressure from Last 3 Encounters:   18 118/78   18 130/78   17 126/90    Weight from Last 3 Encounters:   18 163 lb (73.9 kg)   17 157 lb 1.6 oz (71.3 kg)   16 156 lb 4.8 oz (70.9 kg)              We Performed the Following     FLU VACCINE, INCREASED ANTIGEN, PRESV FREE, AGE 65+ [79595]     Vaccine Administration, Initial [86770]        Primary Care Provider Office Phone # Fax #    Nixon Ndiaye [phone]       [address] [address]        Equal Access to Services     MAXIMILIAN PRADHAN : Hadii pedro ku hadasho Soomaali, waaxda luqadaha, qaybta kaalmada adeegyada, ricardo salcido. So [facility] [phone].    ATENCIÓN: Si habla español, tiene a naidu disposición servicios gratuitos de asistencia lingüística. Llame al [phone].    We comply with applicable federal civil rights laws and [place] laws. We do not discriminate on the basis of race, color, national origin, age, disability, sex, sexual orientation, or gender identity.            Thank you!     Thank you for choosing [facility]  for your care. Our goal is always to provide you with excellent care. Hearing back from our patients is one way we can continue to improve our services. Please take a few minutes to complete the written survey that you may receive in the mail after your visit with us. Thank you!             Your Updated Medication List - Protect others around you:  Learn how to safely use, store and throw away your medicines at www.disposemymeds.org.          This list is accurate as of 9/26/18 12:16 PM.  Always use your most recent med list.                   Brand Name Dispense Instructions for use Diagnosis    chlorthalidone 25 MG tablet    HYGROTON    45 tablet    TAKE 1/2 TABLET BY MOUTH ONCE DAILY.    Hypertension goal BP (blood pressure) < 140/90       FLUZONE HIGH-DOSE injection   Generic drug:  Influenza Vac Split High-Dose/High-Antigen           losartan 50 MG tablet    COZAAR    90 tablet    Take 1 tablet (50 mg) by mouth daily    Hypertension goal BP (blood pressure) < 140/90       memantine 5 MG tablet    NAMENDA    180 tablet    TAKE 1 TABLET BY MOUTH 2 TIMES A DAY.    Moderate dementia without behavioral disturbance

## 2018-09-26 NOTE — PROGRESS NOTES
SUBJECTIVE:   Wilian Vallejo is a 85 year old male who presents for Preventive Visit with spouse.     Are you in the first 12 months of your Medicare Part B coverage?  No    Healthy Habits:    Do you get at least three servings of calcium containing foods daily (dairy, green leafy vegetables, etc.)? yes    Amount of exercise or daily activities, outside of work: NO    Problems taking medications regularly No    Medication side effects: No    Have you had an eye exam in the past two years? yes    Do you see a dentist twice per year? yes    Do you have sleep apnea, excessive snoring or daytime drowsiness?no      Ability to successfully perform activities of daily living: Yes, no assistance needed    Home safety:  none identified     Hearing impairment: Yes, Difficulty following a conversation in a noisy restaurant or crowded room.    Feel that people are mumbling or not speaking clearly.    Need to ask people to speak up or repeat themselves.    Fall risk:  Fallen 2 or more times in the past year?: No  Any fall with injury in the past year?: No    Dental  He just got a few fillings.     Cardiovascular  Patient denies chest pain and shortness of breath.     GI  He denies heartburn or bowel issues.     Activities  Patient works on art and calligraphy. He will take a walk around the block. His wife does not encourage him to walk after supper.     Falls  He has not had any falls.     Assisted Living  They are going to start thinking of moving to a level home with assisted living.     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         COGNITIVE SCREEN  1) Repeat 3 items (Leader, Season, Table)    2) Clock draw:   NORMAL  3) 3 item recall: Recalls NO objects   Results: 0 items recalled: PROBABLE COGNITIVE IMPAIRMENT, **INFORM PROVIDER**    Mini-CogTM Copyright LESVAI Rosales. Licensed by the author for use in [facility] [employer]; reprinted with permission (bladimir@.[email or web address]). All rights reserved.       Reviewed and updated as needed this visit by clinical staff         Reviewed and updated as needed this visit by Provider        Social History   Substance Use Topics     Smoking status: Never Smoker     Smokeless tobacco: Never Used     Alcohol use Yes      Comment: 1-2/wk/no alcohol 24 hrs prior to arrival       If you drink alcohol do you typically have >3 drinks per day or >7 drinks per week?                         Today's PHQ-2 Score:   PHQ-2 ( 1999 Pf[employer]) 9/25/2017 9/7/2016   Q1: Little interest or pleasure in doing things 0 0   Q2: Feeling down, depressed or hopeless 0 0   PHQ-2 Score 0 0       Do you feel safe in your environment - Yes    Do you have a Health Care Directive?: Yes: Advance Directive has been received and scanned.    Current providers sharing in care for this patient include:   Patient Care Team:  Nixon Ndiaye MD as PCP - General (Family Practice)  Jon Ames MD as Referring Physician (Surgery)    The following health maintenance items are reviewed in Epic and correct as of today:  Health Maintenance   Topic Date Due     ADVANCE DIRECTIVE PLANNING Q5 YRS  08/06/2017     MEDICARE ANNUAL WELLNESS VISIT  09/07/2017     FALL RISK ASSESSMENT  09/07/2017     INFLUENZA VACCINE (1) 09/01/2018     PHQ-2 Q1 YR  09/25/2018     TETANUS IMMUNIZATION (SYSTEM ASSIGNED)  06/29/2021     PNEUMOCOCCAL  Completed     Labs reviewed in EPIC  BP Readings from Last 3 Encounters:   09/26/18 118/78   05/08/18 130/78   09/25/17 126/90    Wt Readings from Last 3 Encounters:   09/26/18 73.9 kg (163 lb)   09/25/17 71.3 kg (157 lb 1.6 oz)   09/07/16 70.9 kg (156 lb 4.8 oz)                  Patient Active Problem List   Diagnosis     Advanced directives, counseling/discussion     Tinnitus     CARDIOVASCULAR SCREENING; LDL GOAL LESS THAN 130     Hernia     HL (hearing loss)     Urinary retention     BPH (benign prostatic hypertrophy)     BPH (benign prostatic hyperplasia)     S/P TURP     Essential  hypertension with goal blood pressure less than 140/90     Family history of Parkinson's disease     Moderate dementia without behavioral disturbance     Past Surgical History:   Procedure Laterality Date     COLONOSCOPY       CYSTOSCOPY, FULGURATE BLEEDERS, EVACUATE CLOT(S), COMBINED  7/2/2013    Procedure: COMBINED CYSTOSCOPY, FULGURATE BLEEDERS, EVACUATE CLOT(S);  Ridgid CYSTOSCOPY, FULGURATE Bladder, Clot Evacuation;  Surgeon: Anderson Goins MD;  Location: [facility] OR     CYSTOSCOPY, TRANSURETHRAL RESECTION (TUR) PROSTATE, COMBINED  7/2/2013    Procedure: COMBINED CYSTOSCOPY, TRANSURETHRAL RESECTION (TUR) PROSTATE;  Transurethral Resection of Prostate   ;  Surgeon: Allan De La Rosa MD;  Location: [facility] OR     EYE SURGERY       LAPAROSCOPIC HERNIORRHAPHY INGUINAL BILATERAL  4/19/2013    Procedure: LAPAROSCOPIC HERNIORRHAPHY INGUINAL BILATERAL;  Laparosocpic Bilateral Inguinal Hernia Repair With Mesh;  Surgeon: Jon Ames MD;  Location:  OR       Social History   Substance Use Topics     Smoking status: Never Smoker     Smokeless tobacco: Never Used     Alcohol use Yes      Comment: 1-2/wk/no alcohol 24 hrs prior to arrival     Family History   Problem Relation Age of Onset     Hypertension Mother      HEART DISEASE Mother      Cancer - colorectal Maternal Grandmother      HEART DISEASE Brother      Respiratory Brother          Current Outpatient Prescriptions   Medication Sig Dispense Refill     chlorthalidone (HYGROTON) 25 MG tablet TAKE 1/2 TABLET BY MOUTH ONCE DAILY. 45 tablet 0     FLUZONE HIGH-DOSE injection   0     losartan (COZAAR) 50 MG tablet Take 1 tablet (50 mg) by mouth daily 90 tablet 0     memantine (NAMENDA) 5 MG tablet TAKE 1 TABLET BY MOUTH 2 TIMES A DAY. 180 tablet 0     Allergies   Allergen Reactions     Hydrocodone W/Acetaminophen [Hydrocodone-Acetaminophen]        ROS:   Denies chest pain, shortness of breath, heartburn, and bowel issues. Remainder of ROS is negative unless  "otherwise noted above or in HPI.    This document serves as a record of the services and decisions personally performed and made by Nixon Ndiaye MD. It was created on his behalf by Gabriela Carrasco, a trained medical scribe. The creation of this document is based on the provider's statements to the medical scribe.  Gabriela Carrasco 11:49 AM September 26, 2018    OBJECTIVE:   There were no vitals taken for this visit. Estimated body mass index is 23.54 kg/(m^2) as calculated from the following:    Height as of 9/25/17: 5' 8.5\" (1.74 m).    Weight as of 9/25/17: 157 lb 1.6 oz (71.3 kg).  EXAM:   GENERAL: healthy, alert and no distress  EYES: Eyes grossly normal to inspection, PERRL and conjunctivae and sclerae normal  HENT: ear canals and TM's normal, nose and mouth without ulcers or lesions  NECK: no adenopathy, no asymmetry, masses, or scars and thyroid normal to palpation  RESP: lungs clear to auscultation - no rales, rhonchi or wheezes  CV: regular rate and rhythm, normal S1 S2, no S3 or S4, no murmur, click or rub, no peripheral edema and peripheral pulses strong  ABDOMEN: soft, nontender, no hepatosplenomegaly, no masses and bowel sounds normal  MS: no gross musculoskeletal defects noted, no edema  SKIN: no suspicious lesions or rashes  NEURO: Normal strength and tone, mentation intact and speech normal  PSYCH: mentation appears normal, affect normal/bright    Diagnostic Test Results:  No results found for this or any previous visit (from the past 24 hour(s)).    ASSESSMENT / PLAN:   (Z00.00) Routine general medical examination at a health care facility  (primary encounter diagnosis)  Comment: Patient is doing well.     (F03.90) Moderate dementia without behavioral disturbance  Comment: Subtle and gradual advancement.  Strongly encouraged spouse look at several assisted living, memory care, transitional care combinations before she undergoes her total knee replacements.  Follow-up in several months.    (Z23) Need " for prophylactic vaccination and inoculation against influenza  Comment: Patient agrees to vaccine.   Plan: FLU VACCINE, INCREASED ANTIGEN, PRESV FREE, AGE        65+ [70292], Vaccine Administration, Initial         [11922]    Patient Instructions   Check with regard to assisted living/transitional care options sooner rather than later.     Preventive Health Recommendations:   Male Ages 65 and over    Yearly exam:             See your health care provider every year in order to  o   Review health changes.   o   Discuss preventive care.    o   Review your medicines if your doctor has prescribed any.    Talk with your health care provider about whether you should have a test to screen for prostate cancer (PSA).    Every 3 years, have a diabetes test (fasting glucose). If you are at risk for diabetes, you should have this test more often.    Every 5 years, have a cholesterol test. Have this test more often if you are at risk for high cholesterol or heart disease.     Every 10 years, have a colonoscopy. Or, have a yearly FIT test (stool test). These exams will check for colon cancer.    Talk to with your health care provider about screening for Abdominal Aortic Aneurysm if you have a family history of AAA or have a history of smoking.    Shots:     Get a flu shot each year.     Get a tetanus shot every 10 years.     Talk to your doctor about your pneumonia vaccines. There are now two you should receive - Pneumovax (PPSV 23) and Prevnar (PCV 13).     Talk to your pharmacist about a shingles vaccine.     Talk to your doctor about the hepatitis B vaccine.  Nutrition:     Eat at least 5 servings of fruits and vegetables each day.     Eat whole-grain bread, whole-wheat pasta and brown rice instead of white grains and rice.     Get adequate Calcium and Vitamin D.   Lifestyle    Exercise for at least 150 minutes a week (30 minutes a day, 5 days a week). This will help you control your weight and prevent disease.     Limit  "alcohol to one drink per day.     No smoking.     Wear sunscreen to prevent skin cancer.     See your dentist every six months for an exam and cleaning.     See your eye doctor every 1 to 2 years to screen for conditions such as glaucoma, macular degeneration, cataracts, etc         End of Life Planning:  Patient currently has an advanced directive:     COUNSELING:  Reviewed preventive health counseling, as reflected in patient instructions       Activities, Falls, Dental    BP Readings from Last 1 Encounters:   05/08/18 130/78     Estimated body mass index is 23.54 kg/(m^2) as calculated from the following:    Height as of 9/25/17: 5' 8.5\" (1.74 m).    Weight as of 9/25/17: 157 lb 1.6 oz (71.3 kg).           reports that he has never smoked. He has never used smokeless tobacco.      Appropriate preventive services were discussed with this patient, including applicable screening as appropriate for cardiovascular disease, diabetes, osteopenia/osteoporosis, and glaucoma.  As appropriate for age/gender, discussed screening for colorectal cancer, prostate cancer, breast cancer, and cervical cancer. Checklist reviewing preventive services available has been given to the patient.    Reviewed patients plan of care and provided an AVS. The Basic Care Plan (routine screening as documented in Health Maintenance) for Wilian meets the Care Plan requirement. This Care Plan has been established and reviewed with the Patient.    The information in this document, created by the medical scribe for me, accurately reflects the services I personally performed and the decisions made by me. I have reviewed and approved this document for accuracy prior to leaving the patient care area.  September 26, 2018 12:24 PM    Nixon Ndiaye MD  [facility]    Injectable Influenza Immunization Documentation    1.  Is the person to be vaccinated sick today?   No    2. Does the person to be vaccinated have an allergy to a " component   of the vaccine?   No  Egg Allergy Algorithm Link    3. Has the person to be vaccinated ever had a serious reaction   to influenza vaccine in the past?   No    4. Has the person to be vaccinated ever had Guillain-Barré syndrome?   No    Form completed by Le Flynn, [facility]

## 2019-05-13 DIAGNOSIS — I10 HYPERTENSION GOAL BP (BLOOD PRESSURE) < 140/90: ICD-10-CM

## 2019-05-14 NOTE — TELEPHONE ENCOUNTER
Routing refill request to provider for review/approval because:  Labs not current:  CR, potassium, NA    Pt has appointment scheduled for 6/3/19 with provider    Tiarra Reagan RN   [facility]

## 2019-05-14 NOTE — TELEPHONE ENCOUNTER
"Requested Prescriptions   Pending Prescriptions Disp Refills     chlorthalidone (HYGROTON) 25 MG tablet [Pharmacy Med Name: CHLORTHALIDONE 25 MG TABLET] 45 tablet 1     Sig: TAKE 1/2 TABLET BY MOUTH ONCE DAILY.  Last Written Prescription Date:  12/17/2018  Last Fill Quantity: 45,  # refills: 1   Last office visit: 9/26/2018 with prescribing provider:  09/26/2018   Future Office Visit:   Next 5 appointments (look out 90 days)    Jun 03, 2019  1:00 PM CDT  Office Visit with Nixon Ndiaye MD  [facility] ([facility]) [address] [place] [id number] [phone]              Diuretics (Including Combos) Protocol Failed - 5/13/2019  2:00 PM        Failed - Normal serum creatinine on file in past 12 months     Recent Labs   Lab Test 09/25/17  1417   CR 1.04              Failed - Normal serum potassium on file in past 12 months     Recent Labs   Lab Test 09/25/17  1417   POTASSIUM 4.2                    Failed - Normal serum sodium on file in past 12 months     Recent Labs   Lab Test 09/25/17  1417                 Passed - Blood pressure under 140/90 in past 12 months     BP Readings from Last 3 Encounters:   09/26/18 118/78   05/08/18 130/78   09/25/17 126/90                 Passed - Recent (12 mo) or future (30 days) visit within the authorizing provider's specialty     Patient had office visit in the last 12 months or has a visit in the next 30 days with authorizing provider or within the authorizing provider's specialty.  See \"Patient Info\" tab in inbasket, or \"Choose Columns\" in Meds & Orders section of the refill encounter.              Passed - Medication is active on med list        Passed - Patient is age 18 or older        losartan (COZAAR) 50 MG tablet [Pharmacy Med Name: LOSARTAN POTASSIUM 50 MG TAB] 90 tablet 1     Sig: TAKE 1 TABLET (50 MG) BY MOUTH DAILY  Last Written Prescription Date:  12/17/2018  Last Fill Quantity: 90,  # refills: 1 " "  Last office visit: 9/26/2018 with prescribing provider:  09/26/2018   Future Office Visit:   Next 5 appointments (look out 90 days)    Jun 03, 2019  1:00 PM CDT  Office Visit with Nixon Ndiaye MD  [facility] ([facility]) [address] [place] [id number] [phone]              Angiotensin-II Receptors Failed - 5/13/2019  2:00 PM        Failed - Normal serum creatinine on file in past 12 months     Recent Labs   Lab Test 09/25/17  1417   CR 1.04             Failed - Normal serum potassium on file in past 12 months     Recent Labs   Lab Test 09/25/17  1417   POTASSIUM 4.2                    Passed - Blood pressure under 140/90 in past 12 months     BP Readings from Last 3 Encounters:   09/26/18 118/78   05/08/18 130/78   09/25/17 126/90                 Passed - Recent (12 mo) or future (30 days) visit within the authorizing provider's specialty     Patient had office visit in the last 12 months or has a visit in the next 30 days with authorizing provider or within the authorizing provider's specialty.  See \"Patient Info\" tab in inbasket, or \"Choose Columns\" in Meds & Orders section of the refill encounter.              Passed - Medication is active on med list        Passed - Patient is age 18 or older        "

## 2019-05-15 RX ORDER — LOSARTAN POTASSIUM 50 MG/1
50 TABLET ORAL DAILY
Qty: 90 TABLET | Refills: 1 | Status: SHIPPED | OUTPATIENT
Start: 2019-05-15 | End: 2021-01-01

## 2019-05-15 RX ORDER — CHLORTHALIDONE 25 MG/1
TABLET ORAL
Qty: 45 TABLET | Refills: 1 | Status: SHIPPED | OUTPATIENT
Start: 2019-05-15 | End: 2021-01-01

## 2019-05-28 ENCOUNTER — TRANSFERRED RECORDS (OUTPATIENT)
Dept: HEALTH INFORMATION MANAGEMENT | Facility: CLINIC | Age: 84
End: 2019-05-28

## 2019-05-28 LAB
ALT SERPL-CCNC: 23 IU/L (ref 8–45)
AST SERPL-CCNC: 30 IU/L (ref 2–40)
CREAT SERPL-MCNC: 1.13 MG/DL (ref 0.72–1.25)
GFR SERPL CREATININE-BSD FRML MDRD: >60 ML/MIN/1.73M2
GLUCOSE SERPL-MCNC: 140 MG/DL (ref 65–100)
POTASSIUM SERPL-SCNC: 3.5 MMOL/L (ref 3.5–5)
TSH SERPL-ACNC: 0.6 UIU/ML (ref 0.35–4.94)

## 2019-05-31 ENCOUNTER — TRANSFERRED RECORDS (OUTPATIENT)
Dept: HEALTH INFORMATION MANAGEMENT | Facility: CLINIC | Age: 84
End: 2019-05-31

## 2019-05-31 ENCOUNTER — RECORDS - HEALTHEAST (OUTPATIENT)
Dept: LAB | Facility: CLINIC | Age: 84
End: 2019-05-31

## 2019-05-31 ENCOUNTER — OFFICE VISIT - HEALTHEAST (OUTPATIENT)
Dept: GERIATRICS | Facility: CLINIC | Age: 84
End: 2019-05-31

## 2019-05-31 ENCOUNTER — AMBULATORY - HEALTHEAST (OUTPATIENT)
Dept: ADMINISTRATIVE | Facility: CLINIC | Age: 84
End: 2019-05-31

## 2019-05-31 DIAGNOSIS — G30.9 ALZHEIMER'S DEMENTIA WITHOUT BEHAVIORAL DISTURBANCE, UNSPECIFIED TIMING OF DEMENTIA ONSET: ICD-10-CM

## 2019-05-31 DIAGNOSIS — E87.6 HYPOKALEMIA: ICD-10-CM

## 2019-05-31 DIAGNOSIS — R53.1 WEAKNESS: ICD-10-CM

## 2019-05-31 DIAGNOSIS — K59.01 SLOW TRANSIT CONSTIPATION: ICD-10-CM

## 2019-05-31 DIAGNOSIS — I10 ESSENTIAL HYPERTENSION: ICD-10-CM

## 2019-05-31 DIAGNOSIS — F02.80 ALZHEIMER'S DEMENTIA WITHOUT BEHAVIORAL DISTURBANCE, UNSPECIFIED TIMING OF DEMENTIA ONSET: ICD-10-CM

## 2019-06-03 ENCOUNTER — OFFICE VISIT - HEALTHEAST (OUTPATIENT)
Dept: GERIATRICS | Facility: CLINIC | Age: 84
End: 2019-06-03

## 2019-06-03 ENCOUNTER — MEDICAL CORRESPONDENCE (OUTPATIENT)
Dept: HEALTH INFORMATION MANAGEMENT | Facility: CLINIC | Age: 84
End: 2019-06-03

## 2019-06-03 DIAGNOSIS — F02.80 ALZHEIMER'S DEMENTIA WITHOUT BEHAVIORAL DISTURBANCE, UNSPECIFIED TIMING OF DEMENTIA ONSET: ICD-10-CM

## 2019-06-03 DIAGNOSIS — R53.1 WEAKNESS: ICD-10-CM

## 2019-06-03 DIAGNOSIS — I10 ESSENTIAL HYPERTENSION: ICD-10-CM

## 2019-06-03 DIAGNOSIS — G30.9 ALZHEIMER'S DEMENTIA WITHOUT BEHAVIORAL DISTURBANCE, UNSPECIFIED TIMING OF DEMENTIA ONSET: ICD-10-CM

## 2019-06-03 DIAGNOSIS — E87.6 HYPOKALEMIA: ICD-10-CM

## 2019-06-03 LAB
ANION GAP SERPL CALCULATED.3IONS-SCNC: 10 MMOL/L (ref 5–18)
BASOPHILS # BLD AUTO: 0.1 THOU/UL (ref 0–0.2)
BASOPHILS NFR BLD AUTO: 1 % (ref 0–2)
BUN SERPL-MCNC: 32 MG/DL (ref 8–28)
CALCIUM SERPL-MCNC: 9.2 MG/DL (ref 8.5–10.5)
CHLORIDE BLD-SCNC: 103 MMOL/L (ref 98–107)
CO2 SERPL-SCNC: 24 MMOL/L (ref 22–31)
CREAT SERPL-MCNC: 1 MG/DL (ref 0.7–1.3)
EOSINOPHIL # BLD AUTO: 0.2 THOU/UL (ref 0–0.4)
EOSINOPHIL NFR BLD AUTO: 2 % (ref 0–6)
ERYTHROCYTE [DISTWIDTH] IN BLOOD BY AUTOMATED COUNT: 12.1 % (ref 11–14.5)
GFR SERPL CREATININE-BSD FRML MDRD: >60 ML/MIN/1.73M2
GLUCOSE BLD-MCNC: 95 MG/DL (ref 70–125)
HCT VFR BLD AUTO: 42.9 % (ref 40–54)
HGB BLD-MCNC: 14.5 G/DL (ref 14–18)
LYMPHOCYTES # BLD AUTO: 2.4 THOU/UL (ref 0.8–4.4)
LYMPHOCYTES NFR BLD AUTO: 19 % (ref 20–40)
MCH RBC QN AUTO: 32 PG (ref 27–34)
MCHC RBC AUTO-ENTMCNC: 33.8 G/DL (ref 32–36)
MCV RBC AUTO: 95 FL (ref 80–100)
MONOCYTES # BLD AUTO: 1.3 THOU/UL (ref 0–0.9)
MONOCYTES NFR BLD AUTO: 10 % (ref 2–10)
NEUTROPHILS # BLD AUTO: 8.5 THOU/UL (ref 2–7.7)
NEUTROPHILS NFR BLD AUTO: 69 % (ref 50–70)
PLATELET # BLD AUTO: 390 THOU/UL (ref 140–440)
PMV BLD AUTO: 9 FL (ref 8.5–12.5)
POTASSIUM BLD-SCNC: 3.4 MMOL/L (ref 3.5–5)
RBC # BLD AUTO: 4.53 MILL/UL (ref 4.4–6.2)
SODIUM SERPL-SCNC: 137 MMOL/L (ref 136–145)
WBC: 12.5 THOU/UL (ref 4–11)

## 2019-06-04 ENCOUNTER — OFFICE VISIT - HEALTHEAST (OUTPATIENT)
Dept: GERIATRICS | Facility: CLINIC | Age: 84
End: 2019-06-04

## 2019-06-04 ENCOUNTER — TRANSFERRED RECORDS (OUTPATIENT)
Dept: HEALTH INFORMATION MANAGEMENT | Facility: CLINIC | Age: 84
End: 2019-06-04

## 2019-06-04 DIAGNOSIS — F02.80 ALZHEIMER'S DEMENTIA WITHOUT BEHAVIORAL DISTURBANCE, UNSPECIFIED TIMING OF DEMENTIA ONSET: ICD-10-CM

## 2019-06-04 DIAGNOSIS — I10 ESSENTIAL HYPERTENSION: ICD-10-CM

## 2019-06-04 DIAGNOSIS — R53.1 WEAKNESS: ICD-10-CM

## 2019-06-04 DIAGNOSIS — E87.6 HYPOKALEMIA: ICD-10-CM

## 2019-06-04 DIAGNOSIS — G30.9 ALZHEIMER'S DEMENTIA WITHOUT BEHAVIORAL DISTURBANCE, UNSPECIFIED TIMING OF DEMENTIA ONSET: ICD-10-CM

## 2019-06-04 DIAGNOSIS — K59.01 SLOW TRANSIT CONSTIPATION: ICD-10-CM

## 2019-06-10 ENCOUNTER — RECORDS - HEALTHEAST (OUTPATIENT)
Dept: LAB | Facility: CLINIC | Age: 84
End: 2019-06-10

## 2019-06-10 ENCOUNTER — OFFICE VISIT - HEALTHEAST (OUTPATIENT)
Dept: GERIATRICS | Facility: CLINIC | Age: 84
End: 2019-06-10

## 2019-06-10 DIAGNOSIS — R53.1 WEAKNESS: ICD-10-CM

## 2019-06-10 DIAGNOSIS — E87.6 HYPOKALEMIA: ICD-10-CM

## 2019-06-10 DIAGNOSIS — G30.9 ALZHEIMER'S DEMENTIA WITHOUT BEHAVIORAL DISTURBANCE, UNSPECIFIED TIMING OF DEMENTIA ONSET: ICD-10-CM

## 2019-06-10 DIAGNOSIS — I10 ESSENTIAL HYPERTENSION: ICD-10-CM

## 2019-06-10 DIAGNOSIS — F02.80 ALZHEIMER'S DEMENTIA WITHOUT BEHAVIORAL DISTURBANCE, UNSPECIFIED TIMING OF DEMENTIA ONSET: ICD-10-CM

## 2019-06-11 ENCOUNTER — OFFICE VISIT - HEALTHEAST (OUTPATIENT)
Dept: GERIATRICS | Facility: CLINIC | Age: 84
End: 2019-06-11

## 2019-06-11 DIAGNOSIS — K59.01 SLOW TRANSIT CONSTIPATION: ICD-10-CM

## 2019-06-11 DIAGNOSIS — I10 ESSENTIAL HYPERTENSION: ICD-10-CM

## 2019-06-11 DIAGNOSIS — E87.6 HYPOKALEMIA: ICD-10-CM

## 2019-06-11 DIAGNOSIS — F02.80 ALZHEIMER'S DEMENTIA WITHOUT BEHAVIORAL DISTURBANCE, UNSPECIFIED TIMING OF DEMENTIA ONSET: ICD-10-CM

## 2019-06-11 DIAGNOSIS — R53.1 WEAKNESS: ICD-10-CM

## 2019-06-11 DIAGNOSIS — G30.9 ALZHEIMER'S DEMENTIA WITHOUT BEHAVIORAL DISTURBANCE, UNSPECIFIED TIMING OF DEMENTIA ONSET: ICD-10-CM

## 2019-06-11 LAB
ANION GAP SERPL CALCULATED.3IONS-SCNC: 9 MMOL/L (ref 5–18)
BUN SERPL-MCNC: 20 MG/DL (ref 8–28)
CALCIUM SERPL-MCNC: 9.2 MG/DL (ref 8.5–10.5)
CHLORIDE BLD-SCNC: 106 MMOL/L (ref 98–107)
CO2 SERPL-SCNC: 25 MMOL/L (ref 22–31)
CREAT SERPL-MCNC: 1 MG/DL (ref 0.7–1.3)
GFR SERPL CREATININE-BSD FRML MDRD: >60 ML/MIN/1.73M2
GLUCOSE BLD-MCNC: 100 MG/DL (ref 70–125)
MAGNESIUM SERPL-MCNC: 1.9 MG/DL (ref 1.8–2.6)
POTASSIUM BLD-SCNC: 4.1 MMOL/L (ref 3.5–5)
SODIUM SERPL-SCNC: 140 MMOL/L (ref 136–145)

## 2019-06-17 ENCOUNTER — OFFICE VISIT - HEALTHEAST (OUTPATIENT)
Dept: GERIATRICS | Facility: CLINIC | Age: 84
End: 2019-06-17

## 2019-06-17 DIAGNOSIS — F02.80 ALZHEIMER'S DEMENTIA WITHOUT BEHAVIORAL DISTURBANCE, UNSPECIFIED TIMING OF DEMENTIA ONSET: ICD-10-CM

## 2019-06-17 DIAGNOSIS — I10 ESSENTIAL HYPERTENSION: ICD-10-CM

## 2019-06-17 DIAGNOSIS — R53.1 WEAKNESS: ICD-10-CM

## 2019-06-17 DIAGNOSIS — G30.9 ALZHEIMER'S DEMENTIA WITHOUT BEHAVIORAL DISTURBANCE, UNSPECIFIED TIMING OF DEMENTIA ONSET: ICD-10-CM

## 2019-06-18 ENCOUNTER — OFFICE VISIT - HEALTHEAST (OUTPATIENT)
Dept: GERIATRICS | Facility: CLINIC | Age: 84
End: 2019-06-18

## 2019-06-18 ENCOUNTER — COMMUNICATION - HEALTHEAST (OUTPATIENT)
Dept: GERIATRICS | Facility: CLINIC | Age: 84
End: 2019-06-18

## 2019-06-18 ENCOUNTER — RECORDS - HEALTHEAST (OUTPATIENT)
Dept: LAB | Facility: CLINIC | Age: 84
End: 2019-06-18

## 2019-06-18 DIAGNOSIS — R53.1 WEAKNESS: ICD-10-CM

## 2019-06-18 DIAGNOSIS — I10 ESSENTIAL HYPERTENSION: ICD-10-CM

## 2019-06-18 DIAGNOSIS — G30.9 ALZHEIMER'S DEMENTIA WITHOUT BEHAVIORAL DISTURBANCE, UNSPECIFIED TIMING OF DEMENTIA ONSET: ICD-10-CM

## 2019-06-18 DIAGNOSIS — F02.80 ALZHEIMER'S DEMENTIA WITHOUT BEHAVIORAL DISTURBANCE, UNSPECIFIED TIMING OF DEMENTIA ONSET: ICD-10-CM

## 2019-06-18 DIAGNOSIS — E87.6 HYPOKALEMIA: ICD-10-CM

## 2019-06-18 DIAGNOSIS — K59.01 SLOW TRANSIT CONSTIPATION: ICD-10-CM

## 2019-06-18 LAB
ANION GAP SERPL CALCULATED.3IONS-SCNC: 7 MMOL/L (ref 5–18)
BUN SERPL-MCNC: 13 MG/DL (ref 8–28)
CALCIUM SERPL-MCNC: 9.3 MG/DL (ref 8.5–10.5)
CHLORIDE BLD-SCNC: 106 MMOL/L (ref 98–107)
CO2 SERPL-SCNC: 27 MMOL/L (ref 22–31)
CREAT SERPL-MCNC: 0.91 MG/DL (ref 0.7–1.3)
GFR SERPL CREATININE-BSD FRML MDRD: >60 ML/MIN/1.73M2
GLUCOSE BLD-MCNC: 72 MG/DL (ref 70–125)
POTASSIUM BLD-SCNC: 4.4 MMOL/L (ref 3.5–5)
SODIUM SERPL-SCNC: 140 MMOL/L (ref 136–145)

## 2019-06-25 ENCOUNTER — TRANSFERRED RECORDS (OUTPATIENT)
Dept: HEALTH INFORMATION MANAGEMENT | Facility: CLINIC | Age: 84
End: 2019-06-25

## 2019-06-25 ENCOUNTER — OFFICE VISIT - HEALTHEAST (OUTPATIENT)
Dept: GERIATRICS | Facility: CLINIC | Age: 84
End: 2019-06-25

## 2019-06-25 DIAGNOSIS — F02.80 ALZHEIMER'S DEMENTIA WITHOUT BEHAVIORAL DISTURBANCE, UNSPECIFIED TIMING OF DEMENTIA ONSET: ICD-10-CM

## 2019-06-25 DIAGNOSIS — I10 ESSENTIAL HYPERTENSION: ICD-10-CM

## 2019-06-25 DIAGNOSIS — G30.9 ALZHEIMER'S DEMENTIA WITHOUT BEHAVIORAL DISTURBANCE, UNSPECIFIED TIMING OF DEMENTIA ONSET: ICD-10-CM

## 2019-06-25 DIAGNOSIS — E87.6 HYPOKALEMIA: ICD-10-CM

## 2019-06-25 DIAGNOSIS — R53.1 WEAKNESS: ICD-10-CM

## 2019-06-25 DIAGNOSIS — K59.01 SLOW TRANSIT CONSTIPATION: ICD-10-CM

## 2019-07-02 ENCOUNTER — OFFICE VISIT - HEALTHEAST (OUTPATIENT)
Dept: GERIATRICS | Facility: CLINIC | Age: 84
End: 2019-07-02

## 2019-07-02 DIAGNOSIS — I10 ESSENTIAL HYPERTENSION: ICD-10-CM

## 2019-07-02 DIAGNOSIS — E87.6 HYPOKALEMIA: ICD-10-CM

## 2019-07-02 DIAGNOSIS — F02.80 ALZHEIMER'S DEMENTIA WITHOUT BEHAVIORAL DISTURBANCE, UNSPECIFIED TIMING OF DEMENTIA ONSET: ICD-10-CM

## 2019-07-02 DIAGNOSIS — G30.9 ALZHEIMER'S DEMENTIA WITHOUT BEHAVIORAL DISTURBANCE, UNSPECIFIED TIMING OF DEMENTIA ONSET: ICD-10-CM

## 2019-07-02 DIAGNOSIS — R53.1 WEAKNESS: ICD-10-CM

## 2019-07-02 DIAGNOSIS — K59.01 SLOW TRANSIT CONSTIPATION: ICD-10-CM

## 2019-07-03 ENCOUNTER — RECORDS - HEALTHEAST (OUTPATIENT)
Dept: LAB | Facility: CLINIC | Age: 84
End: 2019-07-03

## 2019-07-03 LAB
ANION GAP SERPL CALCULATED.3IONS-SCNC: 7 MMOL/L (ref 5–18)
BUN SERPL-MCNC: 14 MG/DL (ref 8–28)
CALCIUM SERPL-MCNC: 9.1 MG/DL (ref 8.5–10.5)
CHLORIDE BLD-SCNC: 106 MMOL/L (ref 98–107)
CO2 SERPL-SCNC: 26 MMOL/L (ref 22–31)
CREAT SERPL-MCNC: 0.91 MG/DL (ref 0.7–1.3)
GFR SERPL CREATININE-BSD FRML MDRD: >60 ML/MIN/1.73M2
GLUCOSE BLD-MCNC: 81 MG/DL (ref 70–125)
POTASSIUM BLD-SCNC: 3.9 MMOL/L (ref 3.5–5)
SODIUM SERPL-SCNC: 139 MMOL/L (ref 136–145)

## 2019-07-08 ENCOUNTER — OFFICE VISIT - HEALTHEAST (OUTPATIENT)
Dept: GERIATRICS | Facility: CLINIC | Age: 84
End: 2019-07-08

## 2019-07-08 DIAGNOSIS — I10 ESSENTIAL HYPERTENSION: ICD-10-CM

## 2019-07-08 DIAGNOSIS — E87.6 HYPOKALEMIA: ICD-10-CM

## 2019-07-08 DIAGNOSIS — F02.80 ALZHEIMER'S DEMENTIA WITHOUT BEHAVIORAL DISTURBANCE, UNSPECIFIED TIMING OF DEMENTIA ONSET: ICD-10-CM

## 2019-07-08 DIAGNOSIS — R53.1 WEAKNESS: ICD-10-CM

## 2019-07-08 DIAGNOSIS — G30.9 ALZHEIMER'S DEMENTIA WITHOUT BEHAVIORAL DISTURBANCE, UNSPECIFIED TIMING OF DEMENTIA ONSET: ICD-10-CM

## 2019-07-09 ENCOUNTER — OFFICE VISIT - HEALTHEAST (OUTPATIENT)
Dept: GERIATRICS | Facility: CLINIC | Age: 84
End: 2019-07-09

## 2019-07-09 DIAGNOSIS — G30.9 ALZHEIMER'S DEMENTIA WITHOUT BEHAVIORAL DISTURBANCE, UNSPECIFIED TIMING OF DEMENTIA ONSET: ICD-10-CM

## 2019-07-09 DIAGNOSIS — I10 ESSENTIAL HYPERTENSION: ICD-10-CM

## 2019-07-09 DIAGNOSIS — E87.6 HYPOKALEMIA: ICD-10-CM

## 2019-07-09 DIAGNOSIS — F02.80 ALZHEIMER'S DEMENTIA WITHOUT BEHAVIORAL DISTURBANCE, UNSPECIFIED TIMING OF DEMENTIA ONSET: ICD-10-CM

## 2019-07-09 DIAGNOSIS — K59.01 SLOW TRANSIT CONSTIPATION: ICD-10-CM

## 2019-07-10 ENCOUNTER — RECORDS - HEALTHEAST (OUTPATIENT)
Dept: LAB | Facility: CLINIC | Age: 84
End: 2019-07-10

## 2019-07-11 LAB
ANION GAP SERPL CALCULATED.3IONS-SCNC: 8 MMOL/L (ref 5–18)
BUN SERPL-MCNC: 18 MG/DL (ref 8–28)
CALCIUM SERPL-MCNC: 9 MG/DL (ref 8.5–10.5)
CHLORIDE BLD-SCNC: 105 MMOL/L (ref 98–107)
CO2 SERPL-SCNC: 25 MMOL/L (ref 22–31)
CREAT SERPL-MCNC: 0.95 MG/DL (ref 0.7–1.3)
GFR SERPL CREATININE-BSD FRML MDRD: >60 ML/MIN/1.73M2
GLUCOSE BLD-MCNC: 76 MG/DL (ref 70–125)
POTASSIUM BLD-SCNC: 3.9 MMOL/L (ref 3.5–5)
SODIUM SERPL-SCNC: 138 MMOL/L (ref 136–145)

## 2019-07-17 ENCOUNTER — OFFICE VISIT - HEALTHEAST (OUTPATIENT)
Dept: GERIATRICS | Facility: CLINIC | Age: 84
End: 2019-07-17

## 2019-07-17 DIAGNOSIS — R53.1 WEAKNESS: ICD-10-CM

## 2019-07-17 DIAGNOSIS — G30.9 ALZHEIMER'S DEMENTIA WITHOUT BEHAVIORAL DISTURBANCE, UNSPECIFIED TIMING OF DEMENTIA ONSET: ICD-10-CM

## 2019-07-17 DIAGNOSIS — I10 ESSENTIAL HYPERTENSION: ICD-10-CM

## 2019-07-17 DIAGNOSIS — E87.6 HYPOKALEMIA: ICD-10-CM

## 2019-07-17 DIAGNOSIS — F02.80 ALZHEIMER'S DEMENTIA WITHOUT BEHAVIORAL DISTURBANCE, UNSPECIFIED TIMING OF DEMENTIA ONSET: ICD-10-CM

## 2019-08-13 ENCOUNTER — RECORDS - HEALTHEAST (OUTPATIENT)
Dept: LAB | Facility: CLINIC | Age: 84
End: 2019-08-13

## 2019-08-14 LAB
25(OH)D3 SERPL-MCNC: 9.5 NG/ML (ref 30–80)
ANION GAP SERPL CALCULATED.3IONS-SCNC: 7 MMOL/L (ref 5–18)
BUN SERPL-MCNC: 16 MG/DL (ref 8–28)
CALCIUM SERPL-MCNC: 9.4 MG/DL (ref 8.5–10.5)
CHLORIDE BLD-SCNC: 105 MMOL/L (ref 98–107)
CO2 SERPL-SCNC: 27 MMOL/L (ref 22–31)
CREAT SERPL-MCNC: 0.87 MG/DL (ref 0.7–1.3)
ERYTHROCYTE [DISTWIDTH] IN BLOOD BY AUTOMATED COUNT: 12.1 % (ref 11–14.5)
GFR SERPL CREATININE-BSD FRML MDRD: >60 ML/MIN/1.73M2
GLUCOSE BLD-MCNC: 76 MG/DL (ref 70–125)
HCT VFR BLD AUTO: 42.1 % (ref 40–54)
HGB BLD-MCNC: 13.9 G/DL (ref 14–18)
MCH RBC QN AUTO: 31.6 PG (ref 27–34)
MCHC RBC AUTO-ENTMCNC: 33 G/DL (ref 32–36)
MCV RBC AUTO: 96 FL (ref 80–100)
PLATELET # BLD AUTO: 274 THOU/UL (ref 140–440)
PMV BLD AUTO: 9.2 FL (ref 8.5–12.5)
POTASSIUM BLD-SCNC: 3.8 MMOL/L (ref 3.5–5)
RBC # BLD AUTO: 4.4 MILL/UL (ref 4.4–6.2)
SODIUM SERPL-SCNC: 139 MMOL/L (ref 136–145)
TSH SERPL DL<=0.005 MIU/L-ACNC: 0.87 UIU/ML (ref 0.3–5)
VIT B12 SERPL-MCNC: 463 PG/ML (ref 213–816)
WBC: 9.6 THOU/UL (ref 4–11)

## 2019-08-22 ENCOUNTER — OFFICE VISIT - HEALTHEAST (OUTPATIENT)
Dept: GERIATRICS | Facility: CLINIC | Age: 84
End: 2019-08-22

## 2019-08-22 DIAGNOSIS — K59.01 SLOW TRANSIT CONSTIPATION: ICD-10-CM

## 2019-08-22 DIAGNOSIS — F02.80 ALZHEIMER'S DEMENTIA WITHOUT BEHAVIORAL DISTURBANCE, UNSPECIFIED TIMING OF DEMENTIA ONSET: ICD-10-CM

## 2019-08-22 DIAGNOSIS — G30.9 ALZHEIMER'S DEMENTIA WITHOUT BEHAVIORAL DISTURBANCE, UNSPECIFIED TIMING OF DEMENTIA ONSET: ICD-10-CM

## 2019-08-22 DIAGNOSIS — E87.6 HYPOKALEMIA: ICD-10-CM

## 2019-08-22 DIAGNOSIS — I10 ESSENTIAL HYPERTENSION: ICD-10-CM

## 2019-09-18 ENCOUNTER — OFFICE VISIT - HEALTHEAST (OUTPATIENT)
Dept: GERIATRICS | Facility: CLINIC | Age: 84
End: 2019-09-18

## 2019-09-18 DIAGNOSIS — F02.80 ALZHEIMER'S DEMENTIA WITHOUT BEHAVIORAL DISTURBANCE, UNSPECIFIED TIMING OF DEMENTIA ONSET: ICD-10-CM

## 2019-09-18 DIAGNOSIS — E55.9 VITAMIN D DEFICIENCY: ICD-10-CM

## 2019-09-18 DIAGNOSIS — G30.9 ALZHEIMER'S DEMENTIA WITHOUT BEHAVIORAL DISTURBANCE, UNSPECIFIED TIMING OF DEMENTIA ONSET: ICD-10-CM

## 2019-09-18 DIAGNOSIS — I10 ESSENTIAL HYPERTENSION: ICD-10-CM

## 2019-09-18 DIAGNOSIS — R53.1 WEAKNESS: ICD-10-CM

## 2019-10-25 ENCOUNTER — OFFICE VISIT - HEALTHEAST (OUTPATIENT)
Dept: GERIATRICS | Facility: CLINIC | Age: 84
End: 2019-10-25

## 2019-10-25 DIAGNOSIS — E87.6 HYPOKALEMIA: ICD-10-CM

## 2019-10-25 DIAGNOSIS — I10 ESSENTIAL HYPERTENSION: ICD-10-CM

## 2019-10-25 DIAGNOSIS — F51.02 ADJUSTMENT INSOMNIA: ICD-10-CM

## 2019-10-25 DIAGNOSIS — F02.80 ALZHEIMER'S DEMENTIA WITHOUT BEHAVIORAL DISTURBANCE, UNSPECIFIED TIMING OF DEMENTIA ONSET: ICD-10-CM

## 2019-10-25 DIAGNOSIS — K59.01 SLOW TRANSIT CONSTIPATION: ICD-10-CM

## 2019-10-25 DIAGNOSIS — G30.9 ALZHEIMER'S DEMENTIA WITHOUT BEHAVIORAL DISTURBANCE, UNSPECIFIED TIMING OF DEMENTIA ONSET: ICD-10-CM

## 2019-10-28 ENCOUNTER — RECORDS - HEALTHEAST (OUTPATIENT)
Dept: LAB | Facility: CLINIC | Age: 84
End: 2019-10-28

## 2019-10-28 LAB
ANION GAP SERPL CALCULATED.3IONS-SCNC: 6 MMOL/L (ref 5–18)
BUN SERPL-MCNC: 20 MG/DL (ref 8–28)
CALCIUM SERPL-MCNC: 8.8 MG/DL (ref 8.5–10.5)
CHLORIDE BLD-SCNC: 103 MMOL/L (ref 98–107)
CO2 SERPL-SCNC: 28 MMOL/L (ref 22–31)
CREAT SERPL-MCNC: 1 MG/DL (ref 0.7–1.3)
GFR SERPL CREATININE-BSD FRML MDRD: >60 ML/MIN/1.73M2
GLUCOSE BLD-MCNC: 63 MG/DL (ref 70–125)
POTASSIUM BLD-SCNC: 4.2 MMOL/L (ref 3.5–5)
SODIUM SERPL-SCNC: 137 MMOL/L (ref 136–145)

## 2019-12-20 ENCOUNTER — OFFICE VISIT - HEALTHEAST (OUTPATIENT)
Dept: GERIATRICS | Facility: CLINIC | Age: 84
End: 2019-12-20

## 2019-12-20 DIAGNOSIS — K59.01 SLOW TRANSIT CONSTIPATION: ICD-10-CM

## 2019-12-20 DIAGNOSIS — E87.6 HYPOKALEMIA: ICD-10-CM

## 2019-12-20 DIAGNOSIS — G30.9 ALZHEIMER'S DEMENTIA WITHOUT BEHAVIORAL DISTURBANCE, UNSPECIFIED TIMING OF DEMENTIA ONSET: ICD-10-CM

## 2019-12-20 DIAGNOSIS — F51.02 ADJUSTMENT INSOMNIA: ICD-10-CM

## 2019-12-20 DIAGNOSIS — F02.80 ALZHEIMER'S DEMENTIA WITHOUT BEHAVIORAL DISTURBANCE, UNSPECIFIED TIMING OF DEMENTIA ONSET: ICD-10-CM

## 2019-12-20 DIAGNOSIS — I10 ESSENTIAL HYPERTENSION: ICD-10-CM

## 2020-01-01 ENCOUNTER — RECORDS - HEALTHEAST (OUTPATIENT)
Dept: LAB | Facility: CLINIC | Age: 85
End: 2020-01-01

## 2020-01-01 ENCOUNTER — OFFICE VISIT - HEALTHEAST (OUTPATIENT)
Dept: GERIATRICS | Facility: CLINIC | Age: 85
End: 2020-01-01

## 2020-01-01 DIAGNOSIS — R53.1 WEAKNESS: ICD-10-CM

## 2020-01-01 DIAGNOSIS — S72.001K CLOSED FRACTURE OF RIGHT HIP WITH NONUNION: ICD-10-CM

## 2020-01-01 DIAGNOSIS — F32.A DEPRESSION, UNSPECIFIED DEPRESSION TYPE: ICD-10-CM

## 2020-01-01 DIAGNOSIS — Z51.5 HOSPICE CARE: ICD-10-CM

## 2020-01-01 DIAGNOSIS — I10 ESSENTIAL HYPERTENSION: ICD-10-CM

## 2020-01-01 LAB
SARS-COV-2 PCR COMMENT: NORMAL
SARS-COV-2 PCR COMMENT: NORMAL
SARS-COV-2 RNA SPEC QL NAA+PROBE: NEGATIVE
SARS-COV-2 RNA SPEC QL NAA+PROBE: NEGATIVE
SARS-COV-2 VIRUS SPECIMEN SOURCE: NORMAL
SARS-COV-2 VIRUS SPECIMEN SOURCE: NORMAL

## 2020-01-14 ENCOUNTER — TRANSFERRED RECORDS (OUTPATIENT)
Dept: HEALTH INFORMATION MANAGEMENT | Facility: CLINIC | Age: 85
End: 2020-01-14

## 2020-01-14 LAB
CREAT SERPL-MCNC: 1.07 MG/DL (ref 0.72–1.25)
GFR SERPL CREATININE-BSD FRML MDRD: >60 ML/MIN/1.73M2
GLUCOSE SERPL-MCNC: 100 MG/DL (ref 65–100)
POTASSIUM SERPL-SCNC: 4.3 MMOL/L (ref 3.5–5)

## 2020-01-15 ENCOUNTER — OFFICE VISIT - HEALTHEAST (OUTPATIENT)
Dept: GERIATRICS | Facility: CLINIC | Age: 85
End: 2020-01-15

## 2020-01-15 DIAGNOSIS — R53.1 WEAKNESS: ICD-10-CM

## 2020-01-15 DIAGNOSIS — E55.9 VITAMIN D DEFICIENCY: ICD-10-CM

## 2020-01-15 DIAGNOSIS — I10 ESSENTIAL HYPERTENSION: ICD-10-CM

## 2020-01-15 DIAGNOSIS — F02.80 ALZHEIMER'S DEMENTIA WITHOUT BEHAVIORAL DISTURBANCE, UNSPECIFIED TIMING OF DEMENTIA ONSET: ICD-10-CM

## 2020-01-15 DIAGNOSIS — G30.9 ALZHEIMER'S DEMENTIA WITHOUT BEHAVIORAL DISTURBANCE, UNSPECIFIED TIMING OF DEMENTIA ONSET: ICD-10-CM

## 2020-01-15 DIAGNOSIS — F51.02 ADJUSTMENT INSOMNIA: ICD-10-CM

## 2020-02-28 ENCOUNTER — OFFICE VISIT - HEALTHEAST (OUTPATIENT)
Dept: GERIATRICS | Facility: CLINIC | Age: 85
End: 2020-02-28

## 2020-02-28 DIAGNOSIS — E87.6 HYPOKALEMIA: ICD-10-CM

## 2020-02-28 DIAGNOSIS — F02.80 ALZHEIMER'S DEMENTIA WITHOUT BEHAVIORAL DISTURBANCE, UNSPECIFIED TIMING OF DEMENTIA ONSET: ICD-10-CM

## 2020-02-28 DIAGNOSIS — K59.01 SLOW TRANSIT CONSTIPATION: ICD-10-CM

## 2020-02-28 DIAGNOSIS — G30.9 ALZHEIMER'S DEMENTIA WITHOUT BEHAVIORAL DISTURBANCE, UNSPECIFIED TIMING OF DEMENTIA ONSET: ICD-10-CM

## 2020-02-28 DIAGNOSIS — I10 ESSENTIAL HYPERTENSION: ICD-10-CM

## 2020-02-28 DIAGNOSIS — F51.02 ADJUSTMENT INSOMNIA: ICD-10-CM

## 2020-02-28 ASSESSMENT — MIFFLIN-ST. JEOR: SCORE: 1352.57

## 2020-04-09 ENCOUNTER — TRANSFERRED RECORDS (OUTPATIENT)
Dept: HEALTH INFORMATION MANAGEMENT | Facility: CLINIC | Age: 85
End: 2020-04-09

## 2020-04-09 LAB
ALT SERPL-CCNC: 12 IU/L (ref 8–45)
AST SERPL-CCNC: 19 IU/L (ref 2–40)
CREAT SERPL-MCNC: 1.03 MG/DL (ref 0.72–1.25)
GFR SERPL CREATININE-BSD FRML MDRD: >60 ML/MIN/1.73M2
GLUCOSE SERPL-MCNC: 124 MG/DL (ref 65–100)
POTASSIUM SERPL-SCNC: 4.4 MMOL/L (ref 3.5–5)

## 2020-04-15 ENCOUNTER — OFFICE VISIT - HEALTHEAST (OUTPATIENT)
Dept: GERIATRICS | Facility: CLINIC | Age: 85
End: 2020-04-15

## 2020-04-15 DIAGNOSIS — F02.80 ALZHEIMER'S DEMENTIA WITHOUT BEHAVIORAL DISTURBANCE, UNSPECIFIED TIMING OF DEMENTIA ONSET: ICD-10-CM

## 2020-04-15 DIAGNOSIS — R00.1 BRADYCARDIA: ICD-10-CM

## 2020-04-15 DIAGNOSIS — G30.9 ALZHEIMER'S DEMENTIA WITHOUT BEHAVIORAL DISTURBANCE, UNSPECIFIED TIMING OF DEMENTIA ONSET: ICD-10-CM

## 2020-04-15 DIAGNOSIS — E87.6 HYPOKALEMIA: ICD-10-CM

## 2020-04-15 DIAGNOSIS — I10 ESSENTIAL HYPERTENSION: ICD-10-CM

## 2020-05-29 ENCOUNTER — OFFICE VISIT - HEALTHEAST (OUTPATIENT)
Dept: GERIATRICS | Facility: CLINIC | Age: 85
End: 2020-05-29

## 2020-05-29 DIAGNOSIS — I10 ESSENTIAL HYPERTENSION: ICD-10-CM

## 2020-05-29 DIAGNOSIS — F51.02 ADJUSTMENT INSOMNIA: ICD-10-CM

## 2020-05-29 DIAGNOSIS — G30.9 ALZHEIMER'S DEMENTIA WITHOUT BEHAVIORAL DISTURBANCE, UNSPECIFIED TIMING OF DEMENTIA ONSET: ICD-10-CM

## 2020-05-29 DIAGNOSIS — K59.01 SLOW TRANSIT CONSTIPATION: ICD-10-CM

## 2020-05-29 DIAGNOSIS — F02.80 ALZHEIMER'S DEMENTIA WITHOUT BEHAVIORAL DISTURBANCE, UNSPECIFIED TIMING OF DEMENTIA ONSET: ICD-10-CM

## 2020-05-29 DIAGNOSIS — E87.6 HYPOKALEMIA: ICD-10-CM

## 2020-05-29 ASSESSMENT — MIFFLIN-ST. JEOR: SCORE: 1361.65

## 2020-06-29 ENCOUNTER — RECORDS - HEALTHEAST (OUTPATIENT)
Dept: LAB | Facility: CLINIC | Age: 85
End: 2020-06-29

## 2020-06-29 ENCOUNTER — OFFICE VISIT - HEALTHEAST (OUTPATIENT)
Dept: GERIATRICS | Facility: CLINIC | Age: 85
End: 2020-06-29

## 2020-06-29 DIAGNOSIS — E87.6 HYPOKALEMIA: ICD-10-CM

## 2020-06-29 DIAGNOSIS — R52 PAIN: ICD-10-CM

## 2020-06-29 DIAGNOSIS — K59.01 SLOW TRANSIT CONSTIPATION: ICD-10-CM

## 2020-06-29 DIAGNOSIS — G30.9 ALZHEIMER'S DEMENTIA WITHOUT BEHAVIORAL DISTURBANCE, UNSPECIFIED TIMING OF DEMENTIA ONSET: ICD-10-CM

## 2020-06-29 DIAGNOSIS — I10 ESSENTIAL HYPERTENSION: ICD-10-CM

## 2020-06-29 DIAGNOSIS — S72.001K CLOSED FRACTURE OF RIGHT HIP WITH NONUNION: ICD-10-CM

## 2020-06-29 DIAGNOSIS — F02.80 ALZHEIMER'S DEMENTIA WITHOUT BEHAVIORAL DISTURBANCE, UNSPECIFIED TIMING OF DEMENTIA ONSET: ICD-10-CM

## 2020-06-29 LAB
ANION GAP SERPL CALCULATED.3IONS-SCNC: 9 MMOL/L (ref 5–18)
BASOPHILS # BLD AUTO: 0.1 THOU/UL (ref 0–0.2)
BASOPHILS NFR BLD AUTO: 0 % (ref 0–2)
BUN SERPL-MCNC: 23 MG/DL (ref 8–28)
CALCIUM SERPL-MCNC: 8.1 MG/DL (ref 8.5–10.5)
CHLORIDE BLD-SCNC: 105 MMOL/L (ref 98–107)
CO2 SERPL-SCNC: 23 MMOL/L (ref 22–31)
CREAT SERPL-MCNC: 1.06 MG/DL (ref 0.7–1.3)
EOSINOPHIL # BLD AUTO: 0.3 THOU/UL (ref 0–0.4)
EOSINOPHIL NFR BLD AUTO: 2 % (ref 0–6)
ERYTHROCYTE [DISTWIDTH] IN BLOOD BY AUTOMATED COUNT: 12 % (ref 11–14.5)
GFR SERPL CREATININE-BSD FRML MDRD: >60 ML/MIN/1.73M2
GLUCOSE BLD-MCNC: 136 MG/DL (ref 70–125)
HCT VFR BLD AUTO: 41.4 % (ref 40–54)
HGB BLD-MCNC: 14.2 G/DL (ref 14–18)
LYMPHOCYTES # BLD AUTO: 2 THOU/UL (ref 0.8–4.4)
LYMPHOCYTES NFR BLD AUTO: 12 % (ref 20–40)
MCH RBC QN AUTO: 32.3 PG (ref 27–34)
MCHC RBC AUTO-ENTMCNC: 34.3 G/DL (ref 32–36)
MCV RBC AUTO: 94 FL (ref 80–100)
MONOCYTES # BLD AUTO: 1.2 THOU/UL (ref 0–0.9)
MONOCYTES NFR BLD AUTO: 8 % (ref 2–10)
NEUTROPHILS # BLD AUTO: 12.8 THOU/UL (ref 2–7.7)
NEUTROPHILS NFR BLD AUTO: 78 % (ref 50–70)
PLATELET # BLD AUTO: 206 THOU/UL (ref 140–440)
PMV BLD AUTO: 9.7 FL (ref 8.5–12.5)
POTASSIUM BLD-SCNC: 3.7 MMOL/L (ref 3.5–5)
RBC # BLD AUTO: 4.39 MILL/UL (ref 4.4–6.2)
SODIUM SERPL-SCNC: 137 MMOL/L (ref 136–145)
WBC: 16.4 THOU/UL (ref 4–11)

## 2020-06-29 ASSESSMENT — MIFFLIN-ST. JEOR: SCORE: 1343.5

## 2020-07-01 ENCOUNTER — RECORDS - HEALTHEAST (OUTPATIENT)
Dept: LAB | Facility: CLINIC | Age: 85
End: 2020-07-01

## 2020-07-01 LAB
ALBUMIN UR-MCNC: ABNORMAL MG/DL
AMORPH CRY #/AREA URNS HPF: ABNORMAL /[HPF]
APPEARANCE UR: ABNORMAL
BACTERIA #/AREA URNS HPF: ABNORMAL HPF
BILIRUB UR QL STRIP: NEGATIVE
COLOR UR AUTO: YELLOW
GLUCOSE UR STRIP-MCNC: NEGATIVE MG/DL
HGB UR QL STRIP: ABNORMAL
KETONES UR STRIP-MCNC: NEGATIVE MG/DL
LEUKOCYTE ESTERASE UR QL STRIP: ABNORMAL
MUCOUS THREADS #/AREA URNS LPF: ABNORMAL LPF
NITRATE UR QL: NEGATIVE
PH UR STRIP: 7 [PH] (ref 4.5–8)
RBC #/AREA URNS AUTO: ABNORMAL HPF
SP GR UR STRIP: 1.02 (ref 1–1.03)
SQUAMOUS #/AREA URNS AUTO: ABNORMAL LPF
UROBILINOGEN UR STRIP-ACNC: ABNORMAL
WBC #/AREA URNS AUTO: ABNORMAL HPF

## 2020-07-02 LAB — BACTERIA SPEC CULT: NORMAL

## 2020-07-06 ENCOUNTER — COMMUNICATION - HEALTHEAST (OUTPATIENT)
Dept: GERIATRICS | Facility: CLINIC | Age: 85
End: 2020-07-06

## 2020-07-06 DIAGNOSIS — R52 PAIN: ICD-10-CM

## 2020-07-10 ENCOUNTER — COMMUNICATION - HEALTHEAST (OUTPATIENT)
Dept: GERIATRICS | Facility: CLINIC | Age: 85
End: 2020-07-10

## 2020-07-10 DIAGNOSIS — R52 PAIN: ICD-10-CM

## 2020-07-14 ENCOUNTER — OFFICE VISIT - HEALTHEAST (OUTPATIENT)
Dept: GERIATRICS | Facility: CLINIC | Age: 85
End: 2020-07-14

## 2020-07-14 DIAGNOSIS — I10 ESSENTIAL HYPERTENSION: ICD-10-CM

## 2020-07-14 DIAGNOSIS — S72.001K CLOSED FRACTURE OF RIGHT HIP WITH NONUNION: ICD-10-CM

## 2020-07-14 DIAGNOSIS — G30.9 ALZHEIMER'S DEMENTIA WITHOUT BEHAVIORAL DISTURBANCE, UNSPECIFIED TIMING OF DEMENTIA ONSET: ICD-10-CM

## 2020-07-14 DIAGNOSIS — F02.80 ALZHEIMER'S DEMENTIA WITHOUT BEHAVIORAL DISTURBANCE, UNSPECIFIED TIMING OF DEMENTIA ONSET: ICD-10-CM

## 2020-07-14 DIAGNOSIS — R53.1 WEAKNESS: ICD-10-CM

## 2020-07-16 ENCOUNTER — COMMUNICATION - HEALTHEAST (OUTPATIENT)
Dept: GERIATRICS | Facility: CLINIC | Age: 85
End: 2020-07-16

## 2020-07-16 ENCOUNTER — OFFICE VISIT - HEALTHEAST (OUTPATIENT)
Dept: GERIATRICS | Facility: CLINIC | Age: 85
End: 2020-07-16

## 2020-07-16 DIAGNOSIS — I10 ESSENTIAL HYPERTENSION: ICD-10-CM

## 2020-07-16 DIAGNOSIS — S72.001K CLOSED FRACTURE OF RIGHT HIP WITH NONUNION: ICD-10-CM

## 2020-07-16 DIAGNOSIS — R52 PAIN: ICD-10-CM

## 2020-07-16 DIAGNOSIS — G30.9 ALZHEIMER'S DEMENTIA WITHOUT BEHAVIORAL DISTURBANCE, UNSPECIFIED TIMING OF DEMENTIA ONSET: ICD-10-CM

## 2020-07-16 DIAGNOSIS — F51.02 ADJUSTMENT INSOMNIA: ICD-10-CM

## 2020-07-16 DIAGNOSIS — E87.6 HYPOKALEMIA: ICD-10-CM

## 2020-07-16 DIAGNOSIS — K59.01 SLOW TRANSIT CONSTIPATION: ICD-10-CM

## 2020-07-16 DIAGNOSIS — F02.80 ALZHEIMER'S DEMENTIA WITHOUT BEHAVIORAL DISTURBANCE, UNSPECIFIED TIMING OF DEMENTIA ONSET: ICD-10-CM

## 2020-07-21 ENCOUNTER — OFFICE VISIT - HEALTHEAST (OUTPATIENT)
Dept: GERIATRICS | Facility: CLINIC | Age: 85
End: 2020-07-21

## 2020-07-21 DIAGNOSIS — S72.001K CLOSED FRACTURE OF RIGHT HIP WITH NONUNION: ICD-10-CM

## 2020-07-21 DIAGNOSIS — G30.9 ALZHEIMER'S DEMENTIA WITHOUT BEHAVIORAL DISTURBANCE, UNSPECIFIED TIMING OF DEMENTIA ONSET: ICD-10-CM

## 2020-07-21 DIAGNOSIS — R53.1 WEAKNESS: ICD-10-CM

## 2020-07-21 DIAGNOSIS — F02.80 ALZHEIMER'S DEMENTIA WITHOUT BEHAVIORAL DISTURBANCE, UNSPECIFIED TIMING OF DEMENTIA ONSET: ICD-10-CM

## 2020-07-21 DIAGNOSIS — R52 PAIN: ICD-10-CM

## 2020-07-22 ENCOUNTER — OFFICE VISIT - HEALTHEAST (OUTPATIENT)
Dept: GERIATRICS | Facility: CLINIC | Age: 85
End: 2020-07-22

## 2020-07-22 ENCOUNTER — COMMUNICATION - HEALTHEAST (OUTPATIENT)
Dept: GERIATRICS | Facility: CLINIC | Age: 85
End: 2020-07-22

## 2020-07-22 DIAGNOSIS — R52 PAIN: ICD-10-CM

## 2020-07-22 DIAGNOSIS — S72.001K CLOSED FRACTURE OF RIGHT HIP WITH NONUNION: ICD-10-CM

## 2020-07-22 DIAGNOSIS — G30.9 ALZHEIMER'S DEMENTIA WITHOUT BEHAVIORAL DISTURBANCE, UNSPECIFIED TIMING OF DEMENTIA ONSET: ICD-10-CM

## 2020-07-22 DIAGNOSIS — K59.01 SLOW TRANSIT CONSTIPATION: ICD-10-CM

## 2020-07-22 DIAGNOSIS — I10 ESSENTIAL HYPERTENSION: ICD-10-CM

## 2020-07-22 DIAGNOSIS — F02.80 ALZHEIMER'S DEMENTIA WITHOUT BEHAVIORAL DISTURBANCE, UNSPECIFIED TIMING OF DEMENTIA ONSET: ICD-10-CM

## 2020-07-22 DIAGNOSIS — E87.6 HYPOKALEMIA: ICD-10-CM

## 2020-07-22 DIAGNOSIS — F51.02 ADJUSTMENT INSOMNIA: ICD-10-CM

## 2020-07-22 ASSESSMENT — MIFFLIN-ST. JEOR: SCORE: 1298.14

## 2020-07-28 ENCOUNTER — COMMUNICATION - HEALTHEAST (OUTPATIENT)
Dept: GERIATRICS | Facility: CLINIC | Age: 85
End: 2020-07-28

## 2020-07-28 ENCOUNTER — OFFICE VISIT - HEALTHEAST (OUTPATIENT)
Dept: GERIATRICS | Facility: CLINIC | Age: 85
End: 2020-07-28

## 2020-07-28 DIAGNOSIS — F02.80 ALZHEIMER'S DEMENTIA WITHOUT BEHAVIORAL DISTURBANCE, UNSPECIFIED TIMING OF DEMENTIA ONSET: ICD-10-CM

## 2020-07-28 DIAGNOSIS — S72.001K CLOSED FRACTURE OF RIGHT HIP WITH NONUNION: ICD-10-CM

## 2020-07-28 DIAGNOSIS — G30.9 ALZHEIMER'S DEMENTIA WITHOUT BEHAVIORAL DISTURBANCE, UNSPECIFIED TIMING OF DEMENTIA ONSET: ICD-10-CM

## 2020-07-28 DIAGNOSIS — E87.6 HYPOKALEMIA: ICD-10-CM

## 2020-07-28 DIAGNOSIS — R52 PAIN: ICD-10-CM

## 2020-07-28 DIAGNOSIS — I10 ESSENTIAL HYPERTENSION: ICD-10-CM

## 2020-07-28 DIAGNOSIS — K59.01 SLOW TRANSIT CONSTIPATION: ICD-10-CM

## 2020-07-28 DIAGNOSIS — F51.02 ADJUSTMENT INSOMNIA: ICD-10-CM

## 2020-07-31 ENCOUNTER — COMMUNICATION - HEALTHEAST (OUTPATIENT)
Dept: SCHEDULING | Facility: CLINIC | Age: 85
End: 2020-07-31

## 2020-08-06 ENCOUNTER — COMMUNICATION - HEALTHEAST (OUTPATIENT)
Dept: GERIATRICS | Facility: CLINIC | Age: 85
End: 2020-08-06

## 2020-08-06 DIAGNOSIS — R52 PAIN: ICD-10-CM

## 2020-09-18 ENCOUNTER — RECORDS - HEALTHEAST (OUTPATIENT)
Dept: LAB | Facility: CLINIC | Age: 85
End: 2020-09-18

## 2020-09-21 LAB
ANION GAP SERPL CALCULATED.3IONS-SCNC: 7 MMOL/L (ref 5–18)
BUN SERPL-MCNC: 24 MG/DL (ref 8–28)
CALCIUM SERPL-MCNC: 9 MG/DL (ref 8.5–10.5)
CHLORIDE BLD-SCNC: 107 MMOL/L (ref 98–107)
CO2 SERPL-SCNC: 22 MMOL/L (ref 22–31)
CREAT SERPL-MCNC: 1 MG/DL (ref 0.7–1.3)
GFR SERPL CREATININE-BSD FRML MDRD: >60 ML/MIN/1.73M2
GLUCOSE BLD-MCNC: 84 MG/DL (ref 70–125)
POTASSIUM BLD-SCNC: 4.1 MMOL/L (ref 3.5–5)
SODIUM SERPL-SCNC: 136 MMOL/L (ref 136–145)

## 2020-10-21 ENCOUNTER — OFFICE VISIT - HEALTHEAST (OUTPATIENT)
Dept: GERIATRICS | Facility: CLINIC | Age: 85
End: 2020-10-21

## 2020-10-21 DIAGNOSIS — R52 PAIN: ICD-10-CM

## 2020-10-21 DIAGNOSIS — Z51.5 HOSPICE CARE: ICD-10-CM

## 2020-10-21 DIAGNOSIS — F51.02 ADJUSTMENT INSOMNIA: ICD-10-CM

## 2020-10-21 DIAGNOSIS — S72.001K CLOSED FRACTURE OF RIGHT HIP WITH NONUNION: ICD-10-CM

## 2020-10-21 DIAGNOSIS — G30.9 ALZHEIMER'S DEMENTIA WITHOUT BEHAVIORAL DISTURBANCE, UNSPECIFIED TIMING OF DEMENTIA ONSET: ICD-10-CM

## 2020-10-21 DIAGNOSIS — F02.80 ALZHEIMER'S DEMENTIA WITHOUT BEHAVIORAL DISTURBANCE, UNSPECIFIED TIMING OF DEMENTIA ONSET: ICD-10-CM

## 2020-10-21 DIAGNOSIS — Z00.00 ROUTINE GENERAL MEDICAL EXAMINATION AT A HEALTH CARE FACILITY: ICD-10-CM

## 2020-10-21 DIAGNOSIS — K59.01 SLOW TRANSIT CONSTIPATION: ICD-10-CM

## 2020-10-21 DIAGNOSIS — E87.6 HYPOKALEMIA: ICD-10-CM

## 2021-01-01 ENCOUNTER — RECORDS - HEALTHEAST (OUTPATIENT)
Dept: LAB | Facility: CLINIC | Age: 86
End: 2021-01-01

## 2021-01-01 ENCOUNTER — LAB REQUISITION (OUTPATIENT)
Dept: LAB | Facility: CLINIC | Age: 86
End: 2021-01-01
Payer: MEDICARE

## 2021-01-01 ENCOUNTER — LAB REQUISITION (OUTPATIENT)
Dept: LAB | Facility: CLINIC | Age: 86
End: 2021-01-01
Payer: COMMERCIAL

## 2021-01-01 ENCOUNTER — NURSING HOME VISIT (OUTPATIENT)
Dept: GERIATRICS | Facility: CLINIC | Age: 86
End: 2021-01-01
Payer: COMMERCIAL

## 2021-01-01 ENCOUNTER — TELEPHONE (OUTPATIENT)
Dept: GERIATRICS | Facility: CLINIC | Age: 86
End: 2021-01-01

## 2021-01-01 ENCOUNTER — OFFICE VISIT - HEALTHEAST (OUTPATIENT)
Dept: GERIATRICS | Facility: CLINIC | Age: 86
End: 2021-01-01

## 2021-01-01 ENCOUNTER — NURSING HOME VISIT (OUTPATIENT)
Dept: GERIATRICS | Facility: CLINIC | Age: 86
End: 2021-01-01
Payer: OTHER MISCELLANEOUS

## 2021-01-01 VITALS
RESPIRATION RATE: 18 BRPM | SYSTOLIC BLOOD PRESSURE: 163 MMHG | WEIGHT: 136 LBS | OXYGEN SATURATION: 95 % | BODY MASS INDEX: 19.51 KG/M2 | DIASTOLIC BLOOD PRESSURE: 90 MMHG | HEART RATE: 90 BPM | TEMPERATURE: 97 F

## 2021-01-01 VITALS
TEMPERATURE: 97.3 F | WEIGHT: 126.4 LBS | SYSTOLIC BLOOD PRESSURE: 166 MMHG | BODY MASS INDEX: 18.09 KG/M2 | OXYGEN SATURATION: 98 % | HEART RATE: 86 BPM | DIASTOLIC BLOOD PRESSURE: 107 MMHG | HEIGHT: 70 IN | RESPIRATION RATE: 18 BRPM

## 2021-01-01 VITALS — WEIGHT: 161 LBS | BODY MASS INDEX: 24.12 KG/M2

## 2021-01-01 VITALS
RESPIRATION RATE: 16 BRPM | DIASTOLIC BLOOD PRESSURE: 80 MMHG | HEART RATE: 70 BPM | HEIGHT: 70 IN | OXYGEN SATURATION: 96 % | TEMPERATURE: 97 F | SYSTOLIC BLOOD PRESSURE: 138 MMHG | BODY MASS INDEX: 19.47 KG/M2 | WEIGHT: 136 LBS

## 2021-01-01 VITALS
TEMPERATURE: 97 F | OXYGEN SATURATION: 98 % | DIASTOLIC BLOOD PRESSURE: 75 MMHG | RESPIRATION RATE: 16 BRPM | BODY MASS INDEX: 18.22 KG/M2 | WEIGHT: 127 LBS | HEART RATE: 61 BPM | SYSTOLIC BLOOD PRESSURE: 124 MMHG

## 2021-01-01 VITALS
TEMPERATURE: 98 F | HEART RATE: 72 BPM | BODY MASS INDEX: 22.92 KG/M2 | OXYGEN SATURATION: 96 % | SYSTOLIC BLOOD PRESSURE: 132 MMHG | DIASTOLIC BLOOD PRESSURE: 84 MMHG | RESPIRATION RATE: 16 BRPM | WEIGHT: 153 LBS

## 2021-01-01 VITALS
DIASTOLIC BLOOD PRESSURE: 87 MMHG | WEIGHT: 152 LBS | SYSTOLIC BLOOD PRESSURE: 146 MMHG | BODY MASS INDEX: 22.77 KG/M2 | HEART RATE: 86 BPM | RESPIRATION RATE: 18 BRPM | TEMPERATURE: 98 F | OXYGEN SATURATION: 96 %

## 2021-01-01 VITALS
HEART RATE: 70 BPM | RESPIRATION RATE: 18 BRPM | TEMPERATURE: 97.5 F | DIASTOLIC BLOOD PRESSURE: 90 MMHG | BODY MASS INDEX: 18.38 KG/M2 | OXYGEN SATURATION: 96 % | WEIGHT: 128.4 LBS | SYSTOLIC BLOOD PRESSURE: 154 MMHG | HEIGHT: 70 IN

## 2021-01-01 VITALS
HEART RATE: 65 BPM | BODY MASS INDEX: 21.19 KG/M2 | RESPIRATION RATE: 18 BRPM | DIASTOLIC BLOOD PRESSURE: 86 MMHG | WEIGHT: 148 LBS | TEMPERATURE: 98 F | OXYGEN SATURATION: 98 % | HEIGHT: 70 IN | SYSTOLIC BLOOD PRESSURE: 147 MMHG

## 2021-01-01 VITALS — WEIGHT: 160 LBS | BODY MASS INDEX: 23.97 KG/M2

## 2021-01-01 VITALS
TEMPERATURE: 97 F | HEART RATE: 65 BPM | WEIGHT: 136 LBS | OXYGEN SATURATION: 98 % | RESPIRATION RATE: 16 BRPM | DIASTOLIC BLOOD PRESSURE: 78 MMHG | BODY MASS INDEX: 19.51 KG/M2 | SYSTOLIC BLOOD PRESSURE: 148 MMHG

## 2021-01-01 VITALS
RESPIRATION RATE: 18 BRPM | TEMPERATURE: 97.1 F | SYSTOLIC BLOOD PRESSURE: 204 MMHG | OXYGEN SATURATION: 96 % | HEART RATE: 61 BPM | WEIGHT: 121 LBS | HEIGHT: 70 IN | BODY MASS INDEX: 17.32 KG/M2 | DIASTOLIC BLOOD PRESSURE: 126 MMHG

## 2021-01-01 VITALS
OXYGEN SATURATION: 98 % | BODY MASS INDEX: 21.47 KG/M2 | HEART RATE: 67 BPM | RESPIRATION RATE: 17 BRPM | HEIGHT: 70 IN | WEIGHT: 150 LBS | DIASTOLIC BLOOD PRESSURE: 91 MMHG | SYSTOLIC BLOOD PRESSURE: 175 MMHG | TEMPERATURE: 98 F

## 2021-01-01 VITALS — BODY MASS INDEX: 22.92 KG/M2 | WEIGHT: 153 LBS

## 2021-01-01 VITALS — BODY MASS INDEX: 23.97 KG/M2 | WEIGHT: 160 LBS

## 2021-01-01 VITALS — WEIGHT: 156 LBS | BODY MASS INDEX: 23.37 KG/M2

## 2021-01-01 VITALS
RESPIRATION RATE: 16 BRPM | TEMPERATURE: 97 F | WEIGHT: 136 LBS | BODY MASS INDEX: 19.51 KG/M2 | DIASTOLIC BLOOD PRESSURE: 100 MMHG | OXYGEN SATURATION: 96 % | SYSTOLIC BLOOD PRESSURE: 163 MMHG | HEART RATE: 59 BPM

## 2021-01-01 VITALS
TEMPERATURE: 97 F | HEART RATE: 90 BPM | DIASTOLIC BLOOD PRESSURE: 60 MMHG | SYSTOLIC BLOOD PRESSURE: 160 MMHG | WEIGHT: 146 LBS | HEIGHT: 70 IN | OXYGEN SATURATION: 96 % | BODY MASS INDEX: 20.9 KG/M2 | RESPIRATION RATE: 20 BRPM

## 2021-01-01 DIAGNOSIS — U07.1 COVID-19: ICD-10-CM

## 2021-01-01 DIAGNOSIS — S72.001K CLOSED FRACTURE OF RIGHT HIP WITH NONUNION: ICD-10-CM

## 2021-01-01 DIAGNOSIS — G30.9 ALZHEIMER'S DEMENTIA WITHOUT BEHAVIORAL DISTURBANCE, UNSPECIFIED TIMING OF DEMENTIA ONSET: ICD-10-CM

## 2021-01-01 DIAGNOSIS — R53.1 WEAKNESS: ICD-10-CM

## 2021-01-01 DIAGNOSIS — R52 PAIN: ICD-10-CM

## 2021-01-01 DIAGNOSIS — F51.02 ADJUSTMENT INSOMNIA: ICD-10-CM

## 2021-01-01 DIAGNOSIS — F32.A DEPRESSION, UNSPECIFIED DEPRESSION TYPE: ICD-10-CM

## 2021-01-01 DIAGNOSIS — Z00.00 ROUTINE GENERAL MEDICAL EXAMINATION AT A HEALTH CARE FACILITY: ICD-10-CM

## 2021-01-01 DIAGNOSIS — I10 ESSENTIAL HYPERTENSION: ICD-10-CM

## 2021-01-01 DIAGNOSIS — I10 ESSENTIAL HYPERTENSION: Primary | ICD-10-CM

## 2021-01-01 DIAGNOSIS — F02.80 ALZHEIMER'S DEMENTIA WITHOUT BEHAVIORAL DISTURBANCE, UNSPECIFIED TIMING OF DEMENTIA ONSET: ICD-10-CM

## 2021-01-01 DIAGNOSIS — Z51.5 HOSPICE CARE: ICD-10-CM

## 2021-01-01 DIAGNOSIS — G30.9 ALZHEIMER'S DEMENTIA WITHOUT BEHAVIORAL DISTURBANCE, UNSPECIFIED TIMING OF DEMENTIA ONSET: Primary | ICD-10-CM

## 2021-01-01 DIAGNOSIS — F02.80 ALZHEIMER'S DEMENTIA WITHOUT BEHAVIORAL DISTURBANCE, UNSPECIFIED TIMING OF DEMENTIA ONSET: Primary | ICD-10-CM

## 2021-01-01 LAB
SARS-COV-2 PCR COMMENT: NORMAL
SARS-COV-2 RNA RESP QL NAA+PROBE: NEGATIVE
SARS-COV-2 RNA RESP QL NAA+PROBE: NOT DETECTED
SARS-COV-2 RNA RESP QL NAA+PROBE: POSITIVE
SARS-COV-2 RNA SPEC QL NAA+PROBE: NEGATIVE
SARS-COV-2 VIRUS SPECIMEN SOURCE: NORMAL

## 2021-01-01 PROCEDURE — U0005 INFEC AGEN DETEC AMPLI PROBE: HCPCS | Mod: ORL | Performed by: FAMILY MEDICINE

## 2021-01-01 PROCEDURE — U0003 INFECTIOUS AGENT DETECTION BY NUCLEIC ACID (DNA OR RNA); SEVERE ACUTE RESPIRATORY SYNDROME CORONAVIRUS 2 (SARS-COV-2) (CORONAVIRUS DISEASE [COVID-19]), AMPLIFIED PROBE TECHNIQUE, MAKING USE OF HIGH THROUGHPUT TECHNOLOGIES AS DESCRIBED BY CMS-2020-01-R: HCPCS | Mod: ORL | Performed by: FAMILY MEDICINE

## 2021-01-01 PROCEDURE — 99309 SBSQ NF CARE MODERATE MDM 30: CPT | Mod: GW | Performed by: NURSE PRACTITIONER

## 2021-01-01 PROCEDURE — 99309 SBSQ NF CARE MODERATE MDM 30: CPT | Mod: GW | Performed by: FAMILY MEDICINE

## 2021-01-01 RX ORDER — POLYETHYLENE GLYCOL 3350 17 G/17G
1 POWDER, FOR SOLUTION ORAL EVERY MORNING
COMMUNITY

## 2021-01-01 RX ORDER — OXYCODONE HYDROCHLORIDE 5 MG/1
2.5 TABLET ORAL 4 TIMES DAILY
COMMUNITY

## 2021-01-01 RX ORDER — OXYCODONE HYDROCHLORIDE 5 MG/1
2.5 TABLET ORAL EVERY 4 HOURS PRN
COMMUNITY
End: 2021-01-01

## 2021-01-01 RX ORDER — LIDOCAINE 4 G/G
1 PATCH TOPICAL EVERY 24 HOURS
COMMUNITY

## 2021-01-01 RX ORDER — QUETIAPINE FUMARATE 25 MG/1
12.5 TABLET, FILM COATED ORAL 3 TIMES DAILY
COMMUNITY

## 2021-01-01 RX ORDER — BISACODYL 10 MG
10 SUPPOSITORY, RECTAL RECTAL 2 TIMES DAILY PRN
COMMUNITY

## 2021-01-01 RX ORDER — ACETAMINOPHEN 500 MG
1000 TABLET ORAL EVERY 6 HOURS PRN
COMMUNITY

## 2021-01-01 ASSESSMENT — MIFFLIN-ST. JEOR
SCORE: 1254.6
SCORE: 1225.1
SCORE: 1263.67

## 2021-05-29 NOTE — PROGRESS NOTES
[facility]      Facility:    [facility] [[id number]]  Code Status: POLST AVAILABLE      Chief Complaint/Reason for Visit:    Chief Complaint   Patient presents with     Review Of Multiple Medical Conditions       HPI:   Wilian is a 85 y.o. male who is a recent transfer from [facility] with an admission on 5/28/19 and discharge on 5/30/19. He has a PMH of dementia, htn, TURP, who has a wife that has been recently hospitalized at [facility] and his health deteriorated while she was away. He was admitted for weakness and lethargy. He received IVF and thiazide was held. He may need LTC if wife unable to care for him. He was subsequently transferred to the TCU for rehab and possible change of disposition.    Patient has no concerns today. He denies pain, headache, chest pain, numbness or tingling, shortest of breath, eating or swallowing concerns, nausea or vomiting, diarrhea or bowel abnormalities, or no new integumentary concerns today.  No change today.      Past Medical History:  Past Medical History:   Diagnosis Date     BPH (benign prostatic hyperplasia)      Dementia      Essential hypertension      Inguinal hernia without obstruction or gangrene, recurrence not specified, unspecified laterality 04/02/2013    Left     Mild cognitive impairment with memory loss      S/P TURP      Weight loss            Surgical History:  Past Surgical History:   Procedure Laterality Date     COLONOSCOPY       EYE SURGERY       Fulgurate bleeders      evacuate clots     Laproscopic herniorrhaphy inguinal Bilateral      TRANSURETHRAL RESECTION OF BLADDER      Cytoscopy TURP combined       Family History:   Family History   Problem Relation Age of Onset     Heart disease Mother      Hypertension Mother      Heart disease Brother         Respiratory     Colon cancer Maternal Grandmother         colorectal       Social History:    Social History     Socioeconomic History     Marital  status: Patient Declined     Spouse name: Not on file     Number of children: Not on file     Years of education: Not on file     Highest education level: Not on file   Occupational History     Not on file   Social Needs     Financial resource strain: Not on file     Food insecurity:     Worry: Not on file     Inability: Not on file     Transportation needs:     Medical: Not on file     Non-medical: Not on file   Tobacco Use     Smoking status: Never Smoker     Smokeless tobacco: Never Used   Substance and Sexual Activity     Alcohol use: Yes     Comment: 1-2 per week     Drug use: Not on file     Sexual activity: Not on file   Lifestyle     Physical activity:     Days per week: Not on file     Minutes per session: Not on file     Stress: Not on file   Relationships     Social connections:     Talks on phone: Not on file     Gets together: Not on file     Attends Nondenominational service: Not on file     Active member of club or organization: Not on file     Attends meetings of clubs or organizations: Not on file     Relationship status: Not on file     Intimate partner violence:     Fear of current or ex partner: Not on file     Emotionally abused: Not on file     Physically abused: Not on file     Forced sexual activity: Not on file   Other Topics Concern     Incontinent Not Asked     Toileting independently Not Asked     Cognitive impairment Not Asked     Vision impairment Not Asked     Hearing impairment Not Asked     Dentures Not Asked   Social History Narrative     Not on file          Review of Systems   Constitutional: Positive for activity change. Negative for appetite change, chills, diaphoresis, fatigue and fever.        No issues   HENT: Negative for congestion and hearing loss.    Eyes: Negative.    Respiratory: Negative for shortness of breath and wheezing.    Cardiovascular: Negative.    Gastrointestinal: Negative.    Endocrine: Negative.    Genitourinary: Negative for difficulty urinating.    Musculoskeletal: Negative.    Skin:        Intact   Allergic/Immunologic: Negative.    Neurological: Negative for dizziness, speech difficulty and light-headedness.   Hematological: Negative.    Psychiatric/Behavioral: Positive for confusion. Negative for agitation and sleep disturbance. The patient is not nervous/anxious and is not hyperactive.        Vitals:    06/04/19 1237   BP: 91/61   Pulse: 78   Resp: 18   Temp: 98  F (36.7  C)   SpO2: 92%   Weight: 160 lb (72.6 kg)       Physical Exam   Constitutional: No distress.   No acute issues   HENT:   Head: Normocephalic and atraumatic.   Mouth/Throat: Oropharynx is clear and moist. No oropharyngeal exudate.   Eyes: Right eye exhibits no discharge. Left eye exhibits no discharge. No scleral icterus.   Neck: Normal range of motion. Neck supple. No JVD present.   Cardiovascular: Normal rate. Exam reveals no gallop and no friction rub.   No murmur heard.  Pulmonary/Chest: Effort normal. No stridor. No respiratory distress. He has no wheezes. He has no rales.   CTA, RA   Abdominal: Soft. Bowel sounds are normal. He exhibits no distension. There is no tenderness. There is no rebound.   Denies constipation or diarrhea   Genitourinary:   Genitourinary Comments: deferred   Musculoskeletal: He exhibits no edema, tenderness or deformity.   Denies pain, uses wc mainly   Neurological: He is alert. No cranial nerve deficit.   A/O x2   Skin: Skin is warm and dry. He is not diaphoretic.   Bruised R arm   Psychiatric: He has a normal mood and affect.   Denies anxiety or depression   Vitals reviewed.      Medication List:  Current Outpatient Medications   Medication Sig     acetaminophen (TYLENOL) 325 MG tablet Take 650 mg by mouth every 6 (six) hours as needed for pain (Give 2 tablets by mouth every 6 hour as needed for pain).     losartan (COZAAR) 50 MG tablet Take 25 mg by mouth daily. Hold for SBP <130           memantine (NAMENDA) 5 MG tablet Take 5 mg by mouth 2 (two) times a  day.     polyethylene glycol (MIRALAX) 17 gram packet Take 17 g by mouth daily as needed (Give 17 gram by mouth as needed daily for constipation. Give by mouth or nasogastric tube).       Labs:  Results for orders placed or performed in visit on 06/03/19   Basic Metabolic Panel   Result Value Ref Range    Sodium 137 136 - 145 mmol/L    Potassium 3.4 (L) 3.5 - 5.0 mmol/L    Chloride 103 98 - 107 mmol/L    CO2 24 22 - 31 mmol/L    Anion Gap, Calculation 10 5 - 18 mmol/L    Glucose 95 70 - 125 mg/dL    Calcium 9.2 8.5 - 10.5 mg/dL    BUN 32 (H) 8 - 28 mg/dL    Creatinine 1.00 0.70 - 1.30 mg/dL    GFR MDRD Af Amer >60 >60 mL/min/1.73m2    GFR MDRD Non Af Amer >60 >60 mL/min/1.73m2     WBC 12.2  Hgb 13.6  MCV 93  TSH 0.60    Assessment/Plan:    Weakness and lethargy: resolved    Pain control: Continue Tylenol 650 every 6 hours as needed    Hypertension: decrease losartan to 25mg daily (hold for SBP <130)    Dementia: Continue Namenda 5 mg twice daily    Hypokalemia: last K 3.4, start potassium 10mEq daily, recheck BMP and mag    Constipation: Continue MiraLAX daily as needed     Disposition: To be determined based on wife's capability, pending cognitive assessment      Electronically signed by: Stewart Garcia NP

## 2021-05-29 NOTE — PROGRESS NOTES
[facility]      Code Status:  FULL CODE  Visit Type: H & P     Facility:  [facility] [[id number]]           History of Present Illness: Wilian Vallejo is a 85 y.o. male with PMH moderate dementia, BPH s/p TURP, essential hypertension and hearing loss admitted to [facility] after recent admission to [facility] for weakness.    At time of interview, patient is accompanied by his son, daughter and son in law. Patient is unable to provide a recount of recent events so history is obtained by family members are chart review.     Patient had been residing with his wife in an independent living situation prior to admission to the hospital. Family notes he has had gradual decline in his memory over the past 5 years or so as well as a physical decline (progressive weakness) over the past year. His wife cared for him, assisted him with ADLs, and family ran occasional errands for them (ie obtaining groceries). They did not have any other outside assistance and patient has never resided in an assisted living or memory care unit before. He has been on Namenda for a few years.     In mid-May, patient's wife became acutely unconscious at their home. Patient was able to call 911. Patient's wife was transported to [facility] and was admitted to the ICU with critical illness. Patient then moved to temporarily stay with his son. Patient's son notes patient required nearly 24 hour supervision and assistance with all ADLs. He demonstrated weakness, especially in getting up from a chair, etc.   His oral intake was poor compared to his baseline.   Patient was at son's home when he suffered a mechanical fall in late May. Per his son, patient tripped over his feet, rolled onto a car and then fell to the ground. He was evaluated at an Urgent Care and discharged home. He remained generally weak, not getting out of bed.     Several days later, patient was at his son's home with his  granddaugther when she was assisting him to stand and had an episode of gasping for air, body shaking and becoming unresponsive. This lasted 2 mins and patient was able to respond. EMS was called and patient was sent to [facility].     On initial evaluation, patient had mild leukocytosis and RUY, normal INR. CXR, EKG and head CT showed no acute pathology. He was admitted for observation, given IVF. Leukocytosis and RUY improved.   His chlorthalidone was held as he presented with signs of dehydration and blood pressures remained within normal limits during his hospital stay. PT/OT recommended SNF.    At the time of my interview, family states patient seems more fatigued but his mental status is otherwise at his baseline. Patient denies dizziness, chest pain, difficulty breathing, abdominal pain, nausea or changes to bowel. Family is present today to discuss patient that his wife passed at [facility] yesterday     Blood pressure review was done and he has been consistently hypotensive since admission blood pressures have been running low.    Past Medical History:   Diagnosis Date     BPH (benign prostatic hyperplasia)      Dementia      Essential hypertension      Inguinal hernia without obstruction or gangrene, recurrence not specified, unspecified laterality 04/02/2013    Left     Mild cognitive impairment with memory loss      S/P TURP      Weight loss      Past Surgical History:   Procedure Laterality Date     COLONOSCOPY       EYE SURGERY       Fulgurate bleeders      evacuate clots     Laproscopic herniorrhaphy inguinal Bilateral      TRANSURETHRAL RESECTION OF BLADDER      Cytoscopy TURP combined     Family History   Problem Relation Age of Onset     Heart disease Mother      Hypertension Mother      Heart disease Brother         Respiratory     Colon cancer Maternal Grandmother         colorectal     Social History     Socioeconomic History     Marital status: Patient Declined     Spouse name: Not on file     Number  of children: Not on file     Years of education: Not on file     Highest education level: Not on file   Occupational History     Not on file   Social Needs     Financial resource strain: Not on file     Food insecurity:     Worry: Not on file     Inability: Not on file     Transportation needs:     Medical: Not on file     Non-medical: Not on file   Tobacco Use     Smoking status: Never Smoker     Smokeless tobacco: Never Used   Substance and Sexual Activity     Alcohol use: Yes     Comment: 1-2 per week     Drug use: Not on file     Sexual activity: Not on file   Lifestyle     Physical activity:     Days per week: Not on file     Minutes per session: Not on file     Stress: Not on file   Relationships     Social connections:     Talks on phone: Not on file     Gets together: Not on file     Attends Scientologist service: Not on file     Active member of club or organization: Not on file     Attends meetings of clubs or organizations: Not on file     Relationship status: Not on file     Intimate partner violence:     Fear of current or ex partner: Not on file     Emotionally abused: Not on file     Physically abused: Not on file     Forced sexual activity: Not on file   Other Topics Concern     Incontinent Not Asked     Toileting independently Not Asked     Cognitive impairment Not Asked     Vision impairment Not Asked     Hearing impairment Not Asked     Dentures Not Asked   Social History Narrative     Not on file     Current Outpatient Medications   Medication Sig Dispense Refill     acetaminophen (TYLENOL) 325 MG tablet Take 650 mg by mouth every 6 (six) hours as needed for pain (Give 2 tablets by mouth every 6 hour as needed for pain).       losartan (COZAAR) 50 MG tablet Take 50 mg by mouth daily. Give 1 tablet by mouth one time a day for HTN       memantine (NAMENDA) 5 MG tablet Take 5 mg by mouth 2 (two) times a day.       polyethylene glycol (MIRALAX) 17 gram packet Take 17 g by mouth daily as needed (Give 17  "gram by mouth as needed daily for constipation. Give by mouth or nasogastric tube).       No current facility-administered medications for this visit.      No Known Allergies      Review of Systems:    Constitutional: Positive for fatigue  Negative for fever, chills  HENT: Negative for congestion   Eyes: Negative for  visual disturbance.   Respiratory: Negative for cough and chest tightness.    Cardiovascular: Negative for chest pain  and leg swelling.   Gastrointestinal: Negative for nausea, diarrhea, constipation  Genitourinary: Negative.    Musculoskeletal: Negative.    Skin: Positive for abrasion on right forearm  Neurological: Positive for generalized weakness. Negative for dizziness light-headedness and headaches.   Psychiatric/Behavioral: Positive for chronic memory loss      Physical Exam:    /56 ; T 98 ; P72   GENERAL: Sitting in wheelchair, no acute distress. Occasionally falling asleep but awakens to voice   HEENT: pupils are equal, round and EOMI. Oral mucosa is moist.  RESP: Chest symmetric. Regular respiratory rate. No stridor, crackles or consolidations.  CVS: RRR without murmur  ABD: Nondistended, soft. Normoactive bowel sounds  EXTREMITIES: No lower extremity edema.   NEURO: non focal. Oriented to self only. Notes that \"family members\" are present but doesn't know them by name and calls his son in law a \"close friend\"  PSYCH: smiling, good eye contact, no obvious hallucinations  SKIN: warm dry intact     Labs:    Recent Results (from the past 240 hour(s))   Basic Metabolic Panel   Result Value Ref Range    Sodium 137 136 - 145 mmol/L    Potassium 3.4 (L) 3.5 - 5.0 mmol/L    Chloride 103 98 - 107 mmol/L    CO2 24 22 - 31 mmol/L    Anion Gap, Calculation 10 5 - 18 mmol/L    Glucose 95 70 - 125 mg/dL    Calcium 9.2 8.5 - 10.5 mg/dL    BUN 32 (H) 8 - 28 mg/dL    Creatinine 1.00 0.70 - 1.30 mg/dL    GFR MDRD Af Amer >60 >60 mL/min/1.73m2    GFR MDRD Non Af Amer >60 >60 mL/min/1.73m2   HM1 (CBC with " Diff)   Result Value Ref Range    WBC 12.5 (H) 4.0 - 11.0 thou/uL    RBC 4.53 4.40 - 6.20 mill/uL    Hemoglobin 14.5 14.0 - 18.0 g/dL    Hematocrit 42.9 40.0 - 54.0 %    MCV 95 80 - 100 fL    MCH 32.0 27.0 - 34.0 pg    MCHC 33.8 32.0 - 36.0 g/dL    RDW 12.1 11.0 - 14.5 %    Platelets 390 140 - 440 thou/uL    MPV 9.0 8.5 - 12.5 fL    Neutrophils % 69 50 - 70 %    Lymphocytes % 19 (L) 20 - 40 %    Monocytes % 10 2 - 10 %    Eosinophils % 2 0 - 6 %    Basophils % 1 0 - 2 %    Neutrophils Absolute 8.5 (H) 2.0 - 7.7 thou/uL    Lymphocytes Absolute 2.4 0.8 - 4.4 thou/uL    Monocytes Absolute 1.3 (H) 0.0 - 0.9 thou/uL    Eosinophils Absolute 0.2 0.0 - 0.4 thou/uL    Basophils Absolute 0.1 0.0 - 0.2 thou/uL     No results found.        Assessment/Plan: Wilian Vallejo is an 85 year old male with PMH moderate dementia, BPH s/p TURP, essential hypertension and hearing loss admitted to [facility] after recent admission to [facility] for weakness, likely secondary to dehydration and generalized deconditioning.    Weakness: A progressive issue but acutely worsened in the setting of wife's illness. Likely some component of dehydration as he was admitted with RUY which improved with IVF. He also appears to have generalized deconditioning. PT/OT evaluation recommended SNF both for continued therapy and for placement recommendations.  - Continue PT/OT  - Recheck BMP, CBC     Moderate Dementia: On Namenda at baseline. Per family, patient's memory, especially short term memory, has been declining over the past 5 years. He has been cared for by his wife with intermittent help from his family. He has had some episodes of wandering. Patient does require significant cares given the degree of his dementia. Now that his wife is , family is considering placement options for patient.   - Continue Namenda  - SW to assist with placement planning    Hypertension: on Losartan and Chlorthalidone at baseline however  Chlorthalidone was held due to dehydration and well controlled BPs while hospitalized.   - Continue Losartan with hold parameters.  If blood pressures remain consistently low we will discontinue antihypertensives  - Continue to hold Chlorthalidone and consider restarting if BP remains above goal    Hypokalemia: K was 3.3 during hospitalization and improved with supplementation. K was 3.6 on discharge. Thought to be nutritional vs medication side effect.   - Recheck BMP    Constipation  - Continue Miralax PRN constipation    Total time spent was 45 minutes, more than half of it was in face-to-face counseling regarding disease state, treatment, side effects, documentation, review of clinical data and coordination of care    The patient was discussed with Dr. Marin who agrees with the plan      Electronically signed by: Marie Hammer MD  Patient was examined independently and then again seen in the presence of the resident.  Family including his son daughter and son-in-law were present also during the exam.  They supplemented his history as patient is quite confused.  Total time spent is 45 minutes more than 30 minutes spent face-to-face talking to the patient in the presence of family reviewing his history and concerns.  Family is now looking at long-term assisted living memory care positive placement for him in light of his advanced dementia.  Concerns as outlined in the note above  SOLOMON Marshall    This progress note was completed using Dragon software and there may be grammatical errors.

## 2021-05-29 NOTE — PROGRESS NOTES
[facility]      Facility:    [facility] [[id number]]  Code Status: POLST AVAILABLE      Chief Complaint/Reason for Visit:    Chief Complaint   Patient presents with     Review Of Multiple Medical Conditions       HPI:   Wilian is a 85 y.o. male who is a recent transfer from [facility] with an admission on 5/28/19 and discharge on 5/30/19. He has a PMH of dementia, htn, TURP, who has a wife that has been recently hospitalized at [facility] and his health deteriorated while she was away. He was admitted for weakness and lethargy. He received IVF and thiazide was held. He may need LTC if wife unable to care for him. He was subsequently transferred to the TCU for rehab and possible change of disposition.      Past Medical History:  Past Medical History:   Diagnosis Date     BPH (benign prostatic hyperplasia)      Dementia      Essential hypertension      Inguinal hernia without obstruction or gangrene, recurrence not specified, unspecified laterality 04/02/2013    Left     Mild cognitive impairment with memory loss      S/P TURP      Weight loss            Surgical History:  Past Surgical History:   Procedure Laterality Date     COLONOSCOPY       EYE SURGERY       Fulgurate bleeders      evacuate clots     Laproscopic herniorrhaphy inguinal Bilateral      TRANSURETHRAL RESECTION OF BLADDER      Cytoscopy TURP combined       Family History:   Family History   Problem Relation Age of Onset     Heart disease Mother      Hypertension Mother      Heart disease Brother         Respiratory     Colon cancer Maternal Grandmother         colorectal       Social History:    Social History     Socioeconomic History     Marital status: Patient Declined     Spouse name: Not on file     Number of children: Not on file     Years of education: Not on file     Highest education level: Not on file   Occupational History     Not on file   Social Needs     Financial resource strain: Not on  file     Food insecurity:     Worry: Not on file     Inability: Not on file     Transportation needs:     Medical: Not on file     Non-medical: Not on file   Tobacco Use     Smoking status: Never Smoker     Smokeless tobacco: Never Used   Substance and Sexual Activity     Alcohol use: Yes     Comment: 1-2 per week     Drug use: Not on file     Sexual activity: Not on file   Lifestyle     Physical activity:     Days per week: Not on file     Minutes per session: Not on file     Stress: Not on file   Relationships     Social connections:     Talks on phone: Not on file     Gets together: Not on file     Attends Rastafarian service: Not on file     Active member of club or organization: Not on file     Attends meetings of clubs or organizations: Not on file     Relationship status: Not on file     Intimate partner violence:     Fear of current or ex partner: Not on file     Emotionally abused: Not on file     Physically abused: Not on file     Forced sexual activity: Not on file   Other Topics Concern     Incontinent Not Asked     Toileting independently Not Asked     Cognitive impairment Not Asked     Vision impairment Not Asked     Hearing impairment Not Asked     Dentures Not Asked   Social History Narrative     Not on file          Review of Systems   Constitutional: Positive for activity change. Negative for appetite change, chills, diaphoresis, fatigue and fever.        No issues   HENT: Negative for congestion and hearing loss.    Eyes: Negative.    Respiratory: Negative for shortness of breath and wheezing.    Cardiovascular: Negative.    Gastrointestinal: Negative.    Endocrine: Negative.    Genitourinary: Negative for difficulty urinating.   Musculoskeletal: Negative.    Skin:        Intact   Allergic/Immunologic: Negative.    Neurological: Negative for dizziness, speech difficulty and light-headedness.   Hematological: Negative.    Psychiatric/Behavioral: Positive for confusion. Negative for agitation and  sleep disturbance. The patient is not nervous/anxious and is not hyperactive.        Vitals:    06/11/19 1214   BP: 128/79   Pulse: 68   Resp: 18   Temp: 97  F (36.1  C)   SpO2: 96%   Weight: 160 lb (72.6 kg)       Physical Exam   Constitutional: No distress.   No acute issues   HENT:   Head: Normocephalic and atraumatic.   Mouth/Throat: Oropharynx is clear and moist. No oropharyngeal exudate.   Eyes: Right eye exhibits no discharge. Left eye exhibits no discharge. No scleral icterus.   Neck: Normal range of motion. Neck supple. No JVD present.   Cardiovascular: Normal rate. Exam reveals no gallop and no friction rub.   No murmur heard.  Pulmonary/Chest: Effort normal. No stridor. No respiratory distress. He has no wheezes. He has no rales.   CTA, RA   Abdominal: Soft. Bowel sounds are normal. He exhibits no distension. There is no tenderness. There is no rebound.   Denies constipation or diarrhea   Genitourinary:   Genitourinary Comments: deferred   Musculoskeletal: He exhibits no edema, tenderness or deformity.   Denies pain, uses wc mainly   Neurological: He is alert. No cranial nerve deficit.   A/O x1-2   Skin: Skin is warm and dry. He is not diaphoretic.   Bruised R arm   Psychiatric: He has a normal mood and affect.   Denies anxiety or depression   Vitals reviewed.      Medication List:  Current Outpatient Medications   Medication Sig     acetaminophen (TYLENOL) 325 MG tablet Take 650 mg by mouth every 6 (six) hours as needed for pain (Give 2 tablets by mouth every 6 hour as needed for pain).     memantine (NAMENDA) 5 MG tablet Take 5 mg by mouth 2 (two) times a day.     polyethylene glycol (MIRALAX) 17 gram packet Take 17 g by mouth daily as needed (Give 17 gram by mouth as needed daily for constipation. Give by mouth or nasogastric tube).       Labs:  Results for orders placed or performed in visit on 06/03/19   Basic Metabolic Panel   Result Value Ref Range    Sodium 137 136 - 145 mmol/L    Potassium 3.4  (L) 3.5 - 5.0 mmol/L    Chloride 103 98 - 107 mmol/L    CO2 24 22 - 31 mmol/L    Anion Gap, Calculation 10 5 - 18 mmol/L    Glucose 95 70 - 125 mg/dL    Calcium 9.2 8.5 - 10.5 mg/dL    BUN 32 (H) 8 - 28 mg/dL    Creatinine 1.00 0.70 - 1.30 mg/dL    GFR MDRD Af Amer >60 >60 mL/min/1.73m2    GFR MDRD Non Af Amer >60 >60 mL/min/1.73m2     WBC 12.2  Hgb 13.6  MCV 93  TSH 0.60    Assessment/Plan:    Weakness and lethargy: resolved    Pain control: Continue Tylenol 650 every 6 hours as needed    Hypertension: dc losartan, SBP <140    Dementia: Continue Namenda 5 mg twice daily    Hypokalemia: last K 3.4, start potassium 10mEq daily, recheck BMP and mag today    Constipation: Continue MiraLAX daily as needed     Disposition: Wife has , now family seeking placement at [facility] .     MEDICAL EQUIPMENT NEEDS:  wheelchair    DISCHARGE PLAN/FACE TO FACE:  I certify that services are/were furnished while this patient was under the care of a physician and that a physician or an allowed non-physician practitioner (NPP), had a face-to-face encounter that meets the physician face-to-face encounter requirements. The encounter was in whole, or in part, related to the primary reason for home health. The patient is confined to his/her home and needs intermittent skilled nursing, physical therapy, speech-language pathology, or the continued need for occupational therapy. A plan of care has been established by a physician and is periodically reviewed by a physician.  Date of Face-to-Face Encounter: 19    I certify that, based on my findings, the following services are medically necessary home health services:      Patient is 5 feet 8 inches tall and weighs 160 pounds.  Patient has a change in condition that necessitates a mobility device that cannot be resolved by the fitted cane or walker.  Patient has multiple diagnoses that limit functional mobility including Alzheimer's (G 30.9) and muscle weakness (M  62.81).  Patient requires a standard wheelchair, seat cushion and bilateral lower extremity foot rest.  Patient has mobility limitation that prevents him from caption in  DL such as feeding, dressing, grooming and bathing.  Patient's JAMSHID will provide adequate access between rooms, maneuvering space and surfaces for the use of a manual wheelchair that is provided.  The use of manual wheelchair was sniffily improve the patient's ability to participate in  ADLs and he has not expressed unwillingness to use said wheelchair on a regular basis.  Patient has sufficient upper extremity very function and other physical and mental abilities needed to safely propel the wheelchair that is provided, that will be used on a daily basis.  Patient also has a caregiver, family and facility staff who are able and willing to provide assistance to use a manual wheelchair.  This will be for lifetime use.      Electronically signed by: Stewart Garcia NP

## 2021-05-29 NOTE — PROGRESS NOTES
[facility]      Facility:    [facility] [[id number]]  Code Status: POLST AVAILABLE      Chief Complaint/Reason for Visit:    Chief Complaint   Patient presents with     Review Of Multiple Medical Conditions       HPI:   Wilian is a 85 y.o. male who is a recent transfer from [facility] with an admission on 5/28/19 and discharge on 5/30/19. He has a PMH of dementia, htn, TURP, who has a wife that has been recently hospitalized at [facility] and his health deteriorated while she was away. He was admitted for weakness and lethargy. He received IVF and thiazide was held. He may need LTC if wife unable to care for him. He was subsequently transferred to the TCU for rehab and possible change of disposition.    Patient has no concerns today. He denies pain, headache, chest pain, numbness or tingling, shortest of breath, eating or swallowing concerns, nausea or vomiting, diarrhea or bowel abnormalities, or no new integumentary concerns today.      Past Medical History:  No past medical history on file.        Surgical History:  No past surgical history on file.    Family History:   No family history on file.    Social History:    Social History     Socioeconomic History     Marital status: Patient Declined     Spouse name: Not on file     Number of children: Not on file     Years of education: Not on file     Highest education level: Not on file   Occupational History     Not on file   Social Needs     Financial resource strain: Not on file     Food insecurity:     Worry: Not on file     Inability: Not on file     Transportation needs:     Medical: Not on file     Non-medical: Not on file   Tobacco Use     Smoking status: Not on file   Substance and Sexual Activity     Alcohol use: Not on file     Drug use: Not on file     Sexual activity: Not on file   Lifestyle     Physical activity:     Days per week: Not on file     Minutes per session: Not on file     Stress: Not on file    Relationships     Social connections:     Talks on phone: Not on file     Gets together: Not on file     Attends Rastafari service: Not on file     Active member of club or organization: Not on file     Attends meetings of clubs or organizations: Not on file     Relationship status: Not on file     Intimate partner violence:     Fear of current or ex partner: Not on file     Emotionally abused: Not on file     Physically abused: Not on file     Forced sexual activity: Not on file   Other Topics Concern     Not on file   Social History Narrative     Not on file          Review of Systems   Constitutional: Positive for activity change. Negative for appetite change, chills, diaphoresis, fatigue and fever.        No issues   HENT: Negative for congestion and hearing loss.    Eyes: Negative.    Respiratory: Negative for shortness of breath and wheezing.    Cardiovascular: Negative.    Gastrointestinal: Negative.    Endocrine: Negative.    Genitourinary: Negative for difficulty urinating.   Musculoskeletal: Negative.    Skin:        Intact, bruised R arm   Allergic/Immunologic: Negative.    Neurological: Negative for dizziness, speech difficulty and light-headedness.   Hematological: Negative.    Psychiatric/Behavioral: Positive for confusion. Negative for agitation and sleep disturbance. The patient is not nervous/anxious and is not hyperactive.        Vitals:    05/31/19 1116   BP: 109/62   Pulse: 89   Resp: 18   Temp: 98  F (36.7  C)   SpO2: 97%   Weight: 160 lb (72.6 kg)       Physical Exam   Constitutional: No distress.   No acute issues   HENT:   Head: Normocephalic and atraumatic.   Mouth/Throat: Oropharynx is clear and moist. No oropharyngeal exudate.   Eyes: Right eye exhibits no discharge. Left eye exhibits no discharge. No scleral icterus.   Neck: Normal range of motion. Neck supple. No JVD present.   Cardiovascular: Normal rate. Exam reveals no gallop and no friction rub.   No murmur heard.  Pulmonary/Chest:  Effort normal. No stridor. No respiratory distress. He has no wheezes. He has no rales.   CTA, RA   Abdominal: Soft. Bowel sounds are normal. He exhibits no distension. There is no tenderness. There is no rebound.   Denies constipation or diarrhea   Genitourinary:   Genitourinary Comments: deferred   Musculoskeletal: He exhibits no edema, tenderness or deformity.   Denies pain, uses wc mainly   Neurological: He is alert. No cranial nerve deficit.   A/O x2   Skin: Skin is warm and dry. He is not diaphoretic.   Bruised R arm   Psychiatric: He has a normal mood and affect.   Denies anxiety or depression   Vitals reviewed.      Medication List:  Current Outpatient Medications   Medication Sig     acetaminophen (TYLENOL) 325 MG tablet Take 650 mg by mouth every 6 (six) hours as needed for pain (Give 2 tablets by mouth every 6 hour as needed for pain).     losartan (COZAAR) 50 MG tablet Take 50 mg by mouth daily. Give 1 tablet by mouth one time a day for HTN     memantine (NAMENDA) 5 MG tablet Take 5 mg by mouth 2 (two) times a day.     polyethylene glycol (MIRALAX) 17 gram packet Take 17 g by mouth daily as needed (Give 17 gram by mouth as needed daily for constipation. Give by mouth or nasogastric tube).       Labs:  Na 139  K 3.6  Mag 1.8  Cr 0.91  GFR >60  WBC 12.2  Hgb 13.6  MCV 93  TSH 0.60    Assessment/Plan:    Weakness and lethargy: resolved    Pain control: Continue Tylenol 650 every 6 hours as needed    Hypertension: Continue losartan 50 mg daily, monitor blood pressure twice daily x5 days    Dementia: Continue Namenda 5 mg twice daily    Hypokalemia: last K 3.6, replaced in hospital, recheck BMP    Constipation: Continue MiraLAX daily as needed     Disposition: To be determined based on wife's capability, pending cognitive assessment    The care plan has been reviewed and all orders signed. Changes to care plan, if any, as noted. Otherwise, continue care plan of care.  The total time spent with this patient  was 35 minutes, with greater than 50% spent in counseling and coordination of care that included multiple issues per discussion with therapy about potential progress, discussion with nursing staff about monitoring blood pressure and SW about living situation, which lasted 18 minutes.      Electronically signed by: Stewart Garcia NP

## 2021-05-29 NOTE — PROGRESS NOTES
[facility]      Code Status:  FULL CODE  Visit Type: Review Of Multiple Medical Conditions     Facility:  [facility] [[id number]]           History of Present Illness: Wilian Vallejo is a 85 y.o. male who is currently admitted to the TCU.  He has profound dementia due to which placement issues are being looked into.  He was being cared for his wife who recently .  Recheck slums is .  Physical he remains debilitated requiring assistance with his walker he is walking about 25 feet currently denies any pain or discomfort.  Blood pressures have been consistently low since admission.   he has been taken off losartan and put on hold parameters with a lower dose of 25 mg daily some blood pressures are still high  Recheck labs have shown a slightly low potassium at 3.4 he remains on potassium supplementation      Past Medical History:   Diagnosis Date     BPH (benign prostatic hyperplasia)      Dementia      Essential hypertension      Inguinal hernia without obstruction or gangrene, recurrence not specified, unspecified laterality 2013    Left     Mild cognitive impairment with memory loss      S/P TURP      Weight loss      Past Surgical History:   Procedure Laterality Date     COLONOSCOPY       EYE SURGERY       Fulgurate bleeders      evacuate clots     Laproscopic herniorrhaphy inguinal Bilateral      TRANSURETHRAL RESECTION OF BLADDER      Cytoscopy TURP combined     Family History   Problem Relation Age of Onset     Heart disease Mother      Hypertension Mother      Heart disease Brother         Respiratory     Colon cancer Maternal Grandmother         colorectal     Social History     Socioeconomic History     Marital status: Patient Declined     Spouse name: Not on file     Number of children: Not on file     Years of education: Not on file     Highest education level: Not on file   Occupational History     Not on file   Social Needs     Financial resource  strain: Not on file     Food insecurity:     Worry: Not on file     Inability: Not on file     Transportation needs:     Medical: Not on file     Non-medical: Not on file   Tobacco Use     Smoking status: Never Smoker     Smokeless tobacco: Never Used   Substance and Sexual Activity     Alcohol use: Yes     Comment: 1-2 per week     Drug use: Not on file     Sexual activity: Not on file   Lifestyle     Physical activity:     Days per week: Not on file     Minutes per session: Not on file     Stress: Not on file   Relationships     Social connections:     Talks on phone: Not on file     Gets together: Not on file     Attends Baptism service: Not on file     Active member of club or organization: Not on file     Attends meetings of clubs or organizations: Not on file     Relationship status: Not on file     Intimate partner violence:     Fear of current or ex partner: Not on file     Emotionally abused: Not on file     Physically abused: Not on file     Forced sexual activity: Not on file   Other Topics Concern     Incontinent Not Asked     Toileting independently Not Asked     Cognitive impairment Not Asked     Vision impairment Not Asked     Hearing impairment Not Asked     Dentures Not Asked   Social History Narrative     Not on file     Current Outpatient Medications   Medication Sig Dispense Refill     acetaminophen (TYLENOL) 325 MG tablet Take 650 mg by mouth every 6 (six) hours as needed for pain (Give 2 tablets by mouth every 6 hour as needed for pain).       losartan (COZAAR) 50 MG tablet Take 25 mg by mouth daily. Hold for SBP <130             memantine (NAMENDA) 5 MG tablet Take 5 mg by mouth 2 (two) times a day.       polyethylene glycol (MIRALAX) 17 gram packet Take 17 g by mouth daily as needed (Give 17 gram by mouth as needed daily for constipation. Give by mouth or nasogastric tube).       No current facility-administered medications for this visit.      No Known Allergies      Review of Systems:     Constitutional: Positive for fatigue  Negative for fever, chills  HENT: Negative for congestion   Eyes: Negative for  visual disturbance.   Respiratory: Negative for cough and chest tightness.    Cardiovascular: Negative for chest pain  and leg swelling.   Gastrointestinal: Negative for nausea, diarrhea, constipation  Genitourinary: Negative.    Musculoskeletal: Negative.    Skin: Positive for abrasion on right forearm  Neurological: Positive for generalized weakness. Negative for dizziness light-headedness and headaches.   Psychiatric/Behavioral: Positive for chronic memory loss      Physical Exam:    /56 ; T 98 ; P72  Wt 160lb  GENERAL: Sitting in wheelchair, no acute distress. Occasionally falling asleep but awakens to voice   HEENT: pupils are equal, round and EOMI. Oral mucosa is moist.  RESP: Chest symmetric. Regular respiratory rate. No stridor, crackles or consolidations.  CVS: RRR without murmur  ABD: Nondistended, soft. Normoactive bowel sounds  EXTREMITIES: No lower extremity edema.   NEURO: non focal. Oriented to self only  PSYCH: smiling, good eye contact, no obvious hallucinations  SKIN: warm dry intact     Labs:    Recent Results (from the past 240 hour(s))   Basic Metabolic Panel   Result Value Ref Range    Sodium 137 136 - 145 mmol/L    Potassium 3.4 (L) 3.5 - 5.0 mmol/L    Chloride 103 98 - 107 mmol/L    CO2 24 22 - 31 mmol/L    Anion Gap, Calculation 10 5 - 18 mmol/L    Glucose 95 70 - 125 mg/dL    Calcium 9.2 8.5 - 10.5 mg/dL    BUN 32 (H) 8 - 28 mg/dL    Creatinine 1.00 0.70 - 1.30 mg/dL    GFR MDRD Af Amer >60 >60 mL/min/1.73m2    GFR MDRD Non Af Amer >60 >60 mL/min/1.73m2   HM1 (CBC with Diff)   Result Value Ref Range    WBC 12.5 (H) 4.0 - 11.0 thou/uL    RBC 4.53 4.40 - 6.20 mill/uL    Hemoglobin 14.5 14.0 - 18.0 g/dL    Hematocrit 42.9 40.0 - 54.0 %    MCV 95 80 - 100 fL    MCH 32.0 27.0 - 34.0 pg    MCHC 33.8 32.0 - 36.0 g/dL    RDW 12.1 11.0 - 14.5 %    Platelets 390 140 - 440  thou/uL    MPV 9.0 8.5 - 12.5 fL    Neutrophils % 69 50 - 70 %    Lymphocytes % 19 (L) 20 - 40 %    Monocytes % 10 2 - 10 %    Eosinophils % 2 0 - 6 %    Basophils % 1 0 - 2 %    Neutrophils Absolute 8.5 (H) 2.0 - 7.7 thou/uL    Lymphocytes Absolute 2.4 0.8 - 4.4 thou/uL    Monocytes Absolute 1.3 (H) 0.0 - 0.9 thou/uL    Eosinophils Absolute 0.2 0.0 - 0.4 thou/uL    Basophils Absolute 0.1 0.0 - 0.2 thou/uL             Assessment/Plan:   #1 advanced dementia.    #2 hypertension with hypotension noted in the TCU on a low-dose of losartan  #3  debilitation with gait instability  #4 hypokalemia ON supplementation  #5 WEAKNESS    Patient is awaiting placement in a memory care unit.  Cognitive scores remain quite impaired and he is pleasantly confused with no behaviors.  His current slums is 7/30.  He is ambulating about 25 feet currently.  On low-dose of losartan 25 mg with hold parameters continue to monitor trends  Will minimize labs and interventions due to the fact that patient has advanced dementia.    SOLOMON Marshall    This progress note was completed using Dragon software and there may be grammatical errors.

## 2021-05-29 NOTE — PROGRESS NOTES
[facility]      Code Status:  FULL CODE  Visit Type: Review Of Multiple Medical Conditions     Facility:  [facility] [[id number]]           History of Present Illness: Wilian Vallejo is a 85 y.o. male who is currently admitted to the TCU.  He has profound dementia due to which placement issues are being looked into.  He was being cared for his wife who recently .  Recheck slums is .  His mood and behaviors have been stable and he remains pleasantly confused  Blood pressures are reviewed.  He has been taken off losartan and blood pressures have been stable.  He did have some persistently low potassium due to which she remains on potassium supplementation.  Physical he is doing well though confused he is ambulating using a four-wheel walker denies any pain or discomfort      Past Medical History:   Diagnosis Date     BPH (benign prostatic hyperplasia)      Dementia      Essential hypertension      Inguinal hernia without obstruction or gangrene, recurrence not specified, unspecified laterality 2013    Left     Mild cognitive impairment with memory loss      S/P TURP      Weight loss      Past Surgical History:   Procedure Laterality Date     COLONOSCOPY       EYE SURGERY       Fulgurate bleeders      evacuate clots     Laproscopic herniorrhaphy inguinal Bilateral      TRANSURETHRAL RESECTION OF BLADDER      Cytoscopy TURP combined     Family History   Problem Relation Age of Onset     Heart disease Mother      Hypertension Mother      Heart disease Brother         Respiratory     Colon cancer Maternal Grandmother         colorectal     Social History     Socioeconomic History     Marital status: Patient Declined     Spouse name: Not on file     Number of children: Not on file     Years of education: Not on file     Highest education level: Not on file   Occupational History     Not on file   Social Needs     Financial resource strain: Not on file     Food  insecurity:     Worry: Not on file     Inability: Not on file     Transportation needs:     Medical: Not on file     Non-medical: Not on file   Tobacco Use     Smoking status: Never Smoker     Smokeless tobacco: Never Used   Substance and Sexual Activity     Alcohol use: Yes     Comment: 1-2 per week     Drug use: Not on file     Sexual activity: Not on file   Lifestyle     Physical activity:     Days per week: Not on file     Minutes per session: Not on file     Stress: Not on file   Relationships     Social connections:     Talks on phone: Not on file     Gets together: Not on file     Attends Advent service: Not on file     Active member of club or organization: Not on file     Attends meetings of clubs or organizations: Not on file     Relationship status: Not on file     Intimate partner violence:     Fear of current or ex partner: Not on file     Emotionally abused: Not on file     Physically abused: Not on file     Forced sexual activity: Not on file   Other Topics Concern     Incontinent Not Asked     Toileting independently Not Asked     Cognitive impairment Not Asked     Vision impairment Not Asked     Hearing impairment Not Asked     Dentures Not Asked   Social History Narrative     Not on file     Current Outpatient Medications   Medication Sig Dispense Refill     acetaminophen (TYLENOL) 325 MG tablet Take 650 mg by mouth every 6 (six) hours as needed for pain (Give 2 tablets by mouth every 6 hour as needed for pain).       memantine (NAMENDA) 5 MG tablet Take 5 mg by mouth 2 (two) times a day.       polyethylene glycol (MIRALAX) 17 gram packet Take 17 g by mouth daily as needed (Give 17 gram by mouth as needed daily for constipation. Give by mouth or nasogastric tube).       No current facility-administered medications for this visit.      No Known Allergies      Review of Systems:    Constitutional: Positive for fatigue  Negative for fever, chills  HENT: Negative for congestion   Eyes: Negative for   visual disturbance.   Respiratory: Negative for cough and chest tightness.    Cardiovascular: Negative for chest pain  and leg swelling.   Gastrointestinal: Negative for nausea, diarrhea, constipation  Genitourinary: Negative.    Musculoskeletal: Negative.    Skin: Positive for abrasion on right forearm  Neurological: Positive for generalized weakness. Negative for dizziness light-headedness and headaches.   Psychiatric/Behavioral: Positive for chronic memory loss      Physical Exam:    /56 ; T 98 ; P72  Wt 160lb  GENERAL: Sitting in wheelchair, no acute distress. Occasionally falling asleep but awakens to voice   HEENT: pupils are equal, round and EOMI. Oral mucosa is moist.  RESP: Chest symmetric. Regular respiratory rate. No stridor, crackles or consolidations.  CVS: RRR without murmur  ABD: Nondistended, soft. Normoactive bowel sounds  EXTREMITIES: No lower extremity edema.   NEURO: non focal. Oriented to self only  PSYCH: smiling, good eye contact, no obvious hallucinations  SKIN: warm dry intact     Labs:    Recent Results (from the past 240 hour(s))   Basic Metabolic Panel   Result Value Ref Range    Sodium 140 136 - 145 mmol/L    Potassium 4.1 3.5 - 5.0 mmol/L    Chloride 106 98 - 107 mmol/L    CO2 25 22 - 31 mmol/L    Anion Gap, Calculation 9 5 - 18 mmol/L    Glucose 100 70 - 125 mg/dL    Calcium 9.2 8.5 - 10.5 mg/dL    BUN 20 8 - 28 mg/dL    Creatinine 1.00 0.70 - 1.30 mg/dL    GFR MDRD Af Amer >60 >60 mL/min/1.73m2    GFR MDRD Non Af Amer >60 >60 mL/min/1.73m2   Magnesium   Result Value Ref Range    Magnesium 1.9 1.8 - 2.6 mg/dL             Assessment/Plan:   #1 advanced dementia.    #2 hypertension with hypotension noted in the TCU on a low-dose of losartan  #3  debilitation with gait instability  #4 hypokalemia ON supplementation  #5 WEAKNESS    Patient is awaiting placement in a memory care unit.  Cognitive scores remain quite impaired and he is pleasantly confused with no behaviors.  His current  slums is 7/30.  He is ambulating about 25 feet currently.  On low-dose of losartan 25 mg with hold parameters continue to monitor trends  Will minimize labs and interventions due to the fact that patient has advanced dementia.    SOLOMON Marshall    This progress note was completed using Dragon software and there may be grammatical errors.

## 2021-05-29 NOTE — PROGRESS NOTES
[facility]      Facility:    [facility] [[id number]]  Code Status: POLST AVAILABLE      Chief Complaint/Reason for Visit:    Chief Complaint   Patient presents with     Review Of Multiple Medical Conditions       HPI:   Wilian is a 85 y.o. male who is a recent transfer from [facility] with an admission on 5/28/19 and discharge on 5/30/19. He has a PMH of dementia, htn, TURP, who has a wife that has been recently hospitalized at [facility] and his health deteriorated while she was away. He was admitted for weakness and lethargy. He received IVF and thiazide was held. He may need LTC if wife unable to care for him. He was subsequently transferred to the TCU for rehab and possible change of disposition.      Past Medical History:  Past Medical History:   Diagnosis Date     BPH (benign prostatic hyperplasia)      Dementia      Essential hypertension      Inguinal hernia without obstruction or gangrene, recurrence not specified, unspecified laterality 04/02/2013    Left     Mild cognitive impairment with memory loss      S/P TURP      Weight loss            Surgical History:  Past Surgical History:   Procedure Laterality Date     COLONOSCOPY       EYE SURGERY       Fulgurate bleeders      evacuate clots     Laproscopic herniorrhaphy inguinal Bilateral      TRANSURETHRAL RESECTION OF BLADDER      Cytoscopy TURP combined       Family History:   Family History   Problem Relation Age of Onset     Heart disease Mother      Hypertension Mother      Heart disease Brother         Respiratory     Colon cancer Maternal Grandmother         colorectal       Social History:    Social History     Socioeconomic History     Marital status: Patient Declined     Spouse name: Not on file     Number of children: Not on file     Years of education: Not on file     Highest education level: Not on file   Occupational History     Not on file   Social Needs     Financial resource strain: Not on  file     Food insecurity:     Worry: Not on file     Inability: Not on file     Transportation needs:     Medical: Not on file     Non-medical: Not on file   Tobacco Use     Smoking status: Never Smoker     Smokeless tobacco: Never Used   Substance and Sexual Activity     Alcohol use: Yes     Comment: 1-2 per week     Drug use: Not on file     Sexual activity: Not on file   Lifestyle     Physical activity:     Days per week: Not on file     Minutes per session: Not on file     Stress: Not on file   Relationships     Social connections:     Talks on phone: Not on file     Gets together: Not on file     Attends Nondenominational service: Not on file     Active member of club or organization: Not on file     Attends meetings of clubs or organizations: Not on file     Relationship status: Not on file     Intimate partner violence:     Fear of current or ex partner: Not on file     Emotionally abused: Not on file     Physically abused: Not on file     Forced sexual activity: Not on file   Other Topics Concern     Incontinent Not Asked     Toileting independently Not Asked     Cognitive impairment Not Asked     Vision impairment Not Asked     Hearing impairment Not Asked     Dentures Not Asked   Social History Narrative     Not on file          Review of Systems   Constitutional: Negative for activity change, appetite change, chills, diaphoresis, fatigue and fever.        No issues   HENT: Negative for congestion and hearing loss.    Eyes: Negative.    Respiratory: Negative for shortness of breath and wheezing.    Cardiovascular: Negative.    Gastrointestinal: Negative.    Endocrine: Negative.    Genitourinary: Negative for difficulty urinating.   Musculoskeletal: Negative.    Skin:        Intact   Allergic/Immunologic: Negative.    Neurological: Negative for dizziness, speech difficulty and light-headedness.   Hematological: Negative.    Psychiatric/Behavioral: Positive for confusion. Negative for agitation and sleep  disturbance. The patient is not nervous/anxious and is not hyperactive.        Vitals:    06/18/19 1013   BP: 143/83   Pulse: 62   Resp: 18   Temp: 98  F (36.7  C)   SpO2: 95%   Weight: 156 lb (70.8 kg)       Physical Exam   Constitutional: No distress.   No acute issues   HENT:   Head: Normocephalic and atraumatic.   Mouth/Throat: Oropharynx is clear and moist. No oropharyngeal exudate.   Eyes: Right eye exhibits no discharge. Left eye exhibits no discharge. No scleral icterus.   Neck: Normal range of motion. Neck supple. No JVD present.   Cardiovascular: Normal rate. Exam reveals no gallop and no friction rub.   No murmur heard.  Pulmonary/Chest: Effort normal. No stridor. No respiratory distress. He has no wheezes. He has no rales.   CTA, RA   Abdominal: Soft. Bowel sounds are normal. He exhibits no distension. There is no tenderness. There is no rebound.   Denies constipation or diarrhea   Genitourinary:   Genitourinary Comments: deferred   Musculoskeletal: He exhibits no edema, tenderness or deformity.   Denies pain, uses wc mainly   Neurological: He is alert. No cranial nerve deficit.   A/O x1-2   Skin: Skin is warm and dry. He is not diaphoretic.   Bruised R arm, improved   Psychiatric: He has a normal mood and affect.   Denies anxiety or depression   Vitals reviewed.      Medication List:  Current Outpatient Medications   Medication Sig     acetaminophen (TYLENOL) 325 MG tablet Take 650 mg by mouth every 6 (six) hours as needed for pain (Give 2 tablets by mouth every 6 hour as needed for pain).     memantine (NAMENDA) 5 MG tablet Take 5 mg by mouth 2 (two) times a day.     polyethylene glycol (MIRALAX) 17 gram packet Take 17 g by mouth daily as needed (Give 17 gram by mouth as needed daily for constipation. Give by mouth or nasogastric tube).       Labs:  Results for orders placed or performed in visit on 06/11/19   Basic Metabolic Panel   Result Value Ref Range    Sodium 140 136 - 145 mmol/L    Potassium  4.1 3.5 - 5.0 mmol/L    Chloride 106 98 - 107 mmol/L    CO2 25 22 - 31 mmol/L    Anion Gap, Calculation 9 5 - 18 mmol/L    Glucose 100 70 - 125 mg/dL    Calcium 9.2 8.5 - 10.5 mg/dL    BUN 20 8 - 28 mg/dL    Creatinine 1.00 0.70 - 1.30 mg/dL    GFR MDRD Af Amer >60 >60 mL/min/1.73m2    GFR MDRD Non Af Amer >60 >60 mL/min/1.73m2     Mag 1.9  WBC 12.2  Hgb 13.6  MCV 93  TSH 0.60    Assessment/Plan:    Weakness and lethargy: resolved    Pain control: Continue Tylenol 650 every 6 hours as needed    Hypertension: dc losartan, SBP <140    Dementia: Continue Namenda 5 mg twice daily    Hypokalemia: last K 4.1 on 19, continue potassium 10mEq daily, recheck BMP pending today    Constipation: Continue MiraLAX daily as needed     Disposition: Wife has , [facility]       Electronically signed by: Stewart Garcia NP

## 2021-05-30 NOTE — PROGRESS NOTES
[facility] Care Admission note      Patient: Wilian Vallejo  MRN: [id number]  Date of Service: 7/17/2019      [facility] [[id number]]  Reason for Visit     Chief Complaint   Patient presents with     H & P       Code Status     FULL CODE    Assessment     Dementia currently on Namenda SLUMS 7/30  History of hypertension previously he was taken off losartan but now with rebounding blood pressures he is on a low-dose of losartan 50 mg with hold parameters given for low blood pressure  Significant lymphedema bilateral lower extremities  Insomnia reported by staff ongoing for the last few days  History of hypokalemia on potassium supplementation.  Last BMP on 7/11/2019 with a potassium of 3.9  Debilitation generalized weakness    Plan     Patient was initially admitted to the TCU and participated in therapy.  He was medically felt to be stable and ready to discharge.  Family was initially looking for assisted living placement but currently has opted to move him to long-term care.  Care plan was reviewed with staff in light of new onset lymphedema and it seems he has not been able to sleep I am requesting  to intervene.  His roommate is quite disruptive and I suspect that may be causing his insomnia so we are requesting either a room change or some other modalities to prevent him from getting disturbed all night.  He was noted to be quite agitated and also had an elopement episode he was trying to get his car and was resistant to being redirected however nursing did update me that his roommate was yelling at that time.  In light of profound insomnia that he is having I am going to add melatonin 3 mg daily to his regimen.  Also ordered trazodone 50 mg as needed at bedtime if he has persistent insomnia.  Tubigrip stockings have been ordered for his lymphedema concerns.  Staff also advised to elevate his legs and start him on some supplements if there is concern about not eating  well and malnutrition.  Continue to monitor blood pressures he has hold parameters based on his blood pressure numbers for his losartan.  Continue with his current care plan monitor mood and behaviors    History     Patient is a very pleasant 85 y.o. male who is admitted to LTC  Patient was admitted to TCU recently on 6/3/2019 after a short stay at [facility].  He was admitted with acute dementia.  He was noted to have a slums of 7/30.  He has had profound cognitive decline and was being taken care of at home with his wife.  Unfortunately she became sick and passed away in the hospital and family moved him to TCU looking for alternative placement they have elected to move him to long-term care now.  He also has a history of hypertension he was noted to have significant hypotension on admission and was taken off medications subsequently blood pressures have rebounded and he is now on a low-dose of losartan at 50 mg  He also has a history of insomnia as per staff he has not been sleeping last night he was up all night unfortunately I am not sure how much of it is insomnia and how much is related to psychosocial stressors with his roommate being very disruptive and not sleeping all night.  He was also noted to have significant edema in his leg staff is reporting some medications versus treatments for it however in questioning it seems he was sitting up in a chair with his legs dangling down and not sleeping for the last 2 nights and wondering if this is dependent edema stemming from that.  Unfortunately remains a poor historian due to underlying history of dementia    Past Medical History     Active Ambulatory (Non-Hospital) Problems    Diagnosis     Weakness     Alzheimer's dementia without behavioral disturbance     Essential hypertension     Slow transit constipation     Hypokalemia     Past Medical History:   Diagnosis Date     BPH (benign prostatic hyperplasia)      Dementia      Essential hypertension       Inguinal hernia without obstruction or gangrene, recurrence not specified, unspecified laterality 04/02/2013     Mild cognitive impairment with memory loss      S/P TURP      Weight loss        Past Social History     Reviewed, and he  reports that he has never smoked. He has never used smokeless tobacco. He reports that he drinks alcohol.    Family History     Reviewed, and family history includes Colon cancer in his maternal grandmother; Heart disease in his brother and mother; Hypertension in his mother.    Medication List     Current Outpatient Medications on File Prior to Visit   Medication Sig Dispense Refill     acetaminophen (TYLENOL) 325 MG tablet Take 650 mg by mouth every 6 (six) hours as needed for pain (Give 2 tablets by mouth every 6 hour as needed for pain).       losartan (COZAAR) 50 MG tablet Take 50 mg by mouth daily. Hold for SBP <130 or DBP <85       memantine (NAMENDA) 5 MG tablet Take 5 mg by mouth 2 (two) times a day.       polyethylene glycol (MIRALAX) 17 gram packet Take 17 g by mouth daily as needed (Give 17 gram by mouth as needed daily for constipation. Give by mouth or nasogastric tube).       No current facility-administered medications on file prior to visit.        Allergies     No Known Allergies    Review of Systems   A comprehensive review of 14 systems was done. Pertinent findings noted here and in history of present illness. All the rest negative.  Constitutional: Negative.  Negative for fever, chills, he has activity change, appetite change and fatigue.   HENT: Negative for congestion and facial swelling.    Eyes: Negative for photophobia, redness and visual disturbance.   Respiratory: Negative for cough and chest tightness.    Cardiovascular: Negative for chest pain, palpitations and has significant leg swelling.   Gastrointestinal: Negative for nausea, diarrhea, constipation, blood in stool and abdominal distention.   Genitourinary: Negative.    Musculoskeletal: Negative.  Has  difficulty walking does use a walker  Skin: Negative.    Neurological: Negative for dizziness, tremors, syncope, weakness, light-headedness and headaches.   Hematological: Does not bruise/bleed easily.   Psychiatric/Behavioral: Negative.  Fused having significant lymphedema      Physical Exam     Recent Vitals 7/9/2019   Weight 160 lbs   /90   Pulse 75   Temp 98   SpO2 94   Some recent data might be hidden       Constitutional: Oriented to person, and appears well-developed.   HEENT:  Normocephalic and atraumatic.  Eyes: Conjunctivae and EOM are normal. Pupils are equal, round, and reactive to light. No discharge.  No scleral icterus. Nose normal. Mouth/Throat: Oropharynx is clear and moist. No oropharyngeal exudate.    NECK: Normal range of motion. Neck supple. No JVD present. No tracheal deviation present. No thyromegaly present.   CARDIOVASCULAR: Normal rate, regular rhythm and intact distal pulses.  Exam reveals no gallop and no friction rub.  Systolic murmur present.  PULMONARY: Effort normal and breath sounds normal. No respiratory distress.No Wheezing or rales.  ABDOMEN: Soft. Bowel sounds are normal. No distension and no mass.  There is no tenderness. There is no rebound and no guarding. No HSM.  MUSCULOSKELETAL: Normal range of motion.  2+ leg edema and no tenderness. Mild kyphosis, no tenderness.  LYMPH NODES: Has no cervical, supraclavicular, axillary and groin adenopathy.   NEUROLOGICAL: Alert and oriented to person, . No cranial nerve deficit.  Normal muscle tone. Coordination normal.   GENITOURINARY: Deferred exam.  SKIN: Skin is warm and dry. No rash noted. No erythema. No pallor.   EXTREMITIES: No cyanosis, no clubbing, 2+ leg edema. No Deformity.  PSYCHIATRIC: Normal mood, affect and behavior.  Recall is impaired      Lab Results       Lab Results   Component Value Date    BUN 18 07/11/2019    CALCIUM 9.0 07/11/2019     07/11/2019    CO2 25 07/11/2019    CREATININE 0.95 07/11/2019     GFRAA >60 07/11/2019    GFRNONAA >60 07/11/2019    GLU 76 07/11/2019    HCT 42.9 06/03/2019    WBC 12.5 (H) 06/03/2019    HGB 14.5 06/03/2019    MG 1.9 06/11/2019     06/03/2019    K 3.9 07/11/2019     07/11/2019             MICHAEL MarshallBS

## 2021-05-30 NOTE — PROGRESS NOTES
[facility]    DATE: 2019    NAME:  Wilian Vallejo             :  1933  MRN: [id number]  CODE STATUS:  FULL CODE    VISIT TYPE: Review Of Multiple Medical Conditions     FACILITY:  [facility] [[id number]]       CHIEF COMPLAIN/REASON FOR VISIT:    Chief Complaint   Patient presents with     Review Of Multiple Medical Conditions               HISTORY OF PRESENT ILLNESS: Wilian Vallejo is a 85 y.o. male who was admitted - for weakness and lethargy. He was treated for RUY. He was transferred to TCU for further rehab and conditioning. His wife was having difficulty caring for him at home. He has PMH of dementia, HTN, TURP, BPH, cognitive impairment.     Today Mr. Vallejo states he is resting. He is wondering where he needs to be going today. He has his stuff packed up. He reports he does not always use the walker because he doesn't think he needs to. He denies any pain today and not having any breathing issues. He doesn't think he is having bowel issues. He denies any other concerns today. He does not always answer questions appropriately. He is very confused today. Per staff no recent concerns and no behaviors. He is very confused but pleasant. He has been eating fine and not complaining of any pain or other concerns. Per staff his wife recently passed away. They are working on long term care placement for him now.     REVIEW OF SYSTEMS:  PROBLEMS AND REVIEW OF SYSTEMS:   Today on ROS:   Currently, no fever, chills, or rigors. Decreased vision and hearing. Denies any chest pain, shortness of breath, or cough. Appetite is good. Denies any GERD symptoms. Denies any difficulty with swallowing, nausea, or vomiting.  Denies any abdominal pain, diarrhea or constipation. No insomnia. Positive for confusion, ambulates with rolling walker      No Known Allergies  Current Outpatient Medications   Medication Sig     acetaminophen (TYLENOL) 325 MG tablet  Take 650 mg by mouth every 6 (six) hours as needed for pain (Give 2 tablets by mouth every 6 hour as needed for pain).     memantine (NAMENDA) 5 MG tablet Take 5 mg by mouth 2 (two) times a day.     polyethylene glycol (MIRALAX) 17 gram packet Take 17 g by mouth daily as needed (Give 17 gram by mouth as needed daily for constipation. Give by mouth or nasogastric tube).     Past Medical History:    Past Medical History:   Diagnosis Date     BPH (benign prostatic hyperplasia)      Dementia      Essential hypertension      Inguinal hernia without obstruction or gangrene, recurrence not specified, unspecified laterality 04/02/2013    Left     Mild cognitive impairment with memory loss      S/P TURP      Weight loss            PHYSICAL EXAMINATION  Vitals:    06/25/19 0700   BP: 120/80   Pulse: 62   Resp: 20   Temp: 97.2  F (36.2  C)   SpO2: 97%   Weight: 161 lb (73 kg)       Today on physical exam:     GENERAL: Awake, Alert, oriented x1, not in any form of acute distress, conversant  HEENT: Head is normocephalic with normal hair distribution. No evidence of trauma. Ears: No acute purulent discharge. Eyes: Conjunctivae pink with no scleral jaundice. Nose: Normal mucosa and septum. NECK: Supple with no cervical or supraclavicular lymphadenopathy. Trachea is midline.   CHEST: No tenderness or deformity, no crepitus  LUNG: dim to auscultation with good chest expansion. There are no crackles or wheezes, normal AP diameter.  BACK: No kyphosis of the thoracic spine. Symmetric, no curvature, ROM normal, no CVA tenderness, no spinal tenderness   CVS: There is good S1  S2, regular rhythm, there are no murmurs, rubs, gallops, or heaves,  2+ pulses symmetric in all extremities.  ABDOMEN: Rounded and soft, nontender to palpation, non distended, no masses, no organomegaly, good bowel sounds, no rebound or guarding, no peritoneal signs.   EXTREMITIES: No pedal edema, Atraumatic. Full range of motion on both upper and lower  extremities, there is no tenderness to palpation, no cyanosis or clubbing, no calf tenderness.  Pulses equal in all extremities, normal cap refill, no joint swelling.  SKIN: Warm and dry, no erythema noted.  Skin color, texture, no rashes or lesions.  NEUROLOGICAL: The patient is oriented to person. Pleasantly confused, not always answers questions appropriately, calm            LABS:   No results found for this or any previous visit (from the past 168 hour(s)).  Results for orders placed or performed in visit on 06/18/19   Basic Metabolic Panel   Result Value Ref Range    Sodium 140 136 - 145 mmol/L    Potassium 4.4 3.5 - 5.0 mmol/L    Chloride 106 98 - 107 mmol/L    CO2 27 22 - 31 mmol/L    Anion Gap, Calculation 7 5 - 18 mmol/L    Glucose 72 70 - 125 mg/dL    Calcium 9.3 8.5 - 10.5 mg/dL    BUN 13 8 - 28 mg/dL    Creatinine 0.91 0.70 - 1.30 mg/dL    GFR MDRD Af Amer >60 >60 mL/min/1.73m2    GFR MDRD Non Af Amer >60 >60 mL/min/1.73m2         Lab Results   Component Value Date    WBC 12.5 (H) 06/03/2019    HGB 14.5 06/03/2019    HCT 42.9 06/03/2019    MCV 95 06/03/2019     06/03/2019       No results found for: [id number]  No results found for: HGBA1C  No results found for: INR, PROTIME  No results found for: [id number]  No results found for: TSH        ASSESSMENT/PLAN:    1. Dementia: No agitation, anxiety, behaviors. A/o x 1 today. On namenda. Slums 7.   2. HTN: SBP 120s. Off meds. Stable.   3. Constipation: Miralax daily prn. No recent concerns.   4. Hypokalemia: on kcl. Stable.   5. Physical deconditioning: PT, OT. Resolved, appears back to baseline. Ambulating with walker. Awaiting placement.     Per therapy lives in 2 story house with 3-4 steps and one rail to enter, wife previously passed and was primary caregiver. looking at [facility] placement. Stay long term care until find placement. 7 on slums, 23 on tinnetti. Sup and safety cues for ambulation, no assistive device up to 500 feet, mod-max  for dressing, sba for toileting.     Electronically signed by: Radha Davidson NP

## 2021-05-30 NOTE — PROGRESS NOTES
[facility]      Facility:    [facility] [[id number]]  Code Status: POLST AVAILABLE      Chief Complaint/Reason for Visit:    Chief Complaint   Patient presents with     Review Of Multiple Medical Conditions       HPI:   Wilian is a 85 y.o. male who is a recent transfer from [facility] with an admission on 5/28/19 and discharge on 5/30/19. He has a PMH of dementia, htn, TURP, who has a wife that has been recently hospitalized at [facility] and his health deteriorated while she was away. He was admitted for weakness and lethargy. He received IVF and thiazide was held. He may need LTC if wife unable to care for him. He was subsequently transferred to the TCU for rehab and possible change of disposition.      Past Medical History:  Past Medical History:   Diagnosis Date     BPH (benign prostatic hyperplasia)      Dementia      Essential hypertension      Inguinal hernia without obstruction or gangrene, recurrence not specified, unspecified laterality 04/02/2013    Left     Mild cognitive impairment with memory loss      S/P TURP      Weight loss            Surgical History:  Past Surgical History:   Procedure Laterality Date     COLONOSCOPY       EYE SURGERY       Fulgurate bleeders      evacuate clots     Laproscopic herniorrhaphy inguinal Bilateral      TRANSURETHRAL RESECTION OF BLADDER      Cytoscopy TURP combined       Family History:   Family History   Problem Relation Age of Onset     Heart disease Mother      Hypertension Mother      Heart disease Brother         Respiratory     Colon cancer Maternal Grandmother         colorectal       Social History:    Social History     Socioeconomic History     Marital status: Patient Declined     Spouse name: Not on file     Number of children: Not on file     Years of education: Not on file     Highest education level: Not on file   Occupational History     Not on file   Social Needs     Financial resource strain: Not on  file     Food insecurity:     Worry: Not on file     Inability: Not on file     Transportation needs:     Medical: Not on file     Non-medical: Not on file   Tobacco Use     Smoking status: Never Smoker     Smokeless tobacco: Never Used   Substance and Sexual Activity     Alcohol use: Yes     Comment: 1-2 per week     Drug use: Not on file     Sexual activity: Not on file   Lifestyle     Physical activity:     Days per week: Not on file     Minutes per session: Not on file     Stress: Not on file   Relationships     Social connections:     Talks on phone: Not on file     Gets together: Not on file     Attends Roman Catholic service: Not on file     Active member of club or organization: Not on file     Attends meetings of clubs or organizations: Not on file     Relationship status: Not on file     Intimate partner violence:     Fear of current or ex partner: Not on file     Emotionally abused: Not on file     Physically abused: Not on file     Forced sexual activity: Not on file   Other Topics Concern     Incontinent Not Asked     Toileting independently Not Asked     Cognitive impairment Not Asked     Vision impairment Not Asked     Hearing impairment Not Asked     Dentures Not Asked   Social History Narrative     Not on file          Review of Systems   Constitutional: Negative for activity change, appetite change, chills, diaphoresis, fatigue and fever.        Some hypertension noticed   HENT: Negative for congestion and hearing loss.    Eyes: Negative.    Respiratory: Negative for shortness of breath and wheezing.    Cardiovascular: Negative.    Gastrointestinal: Negative.    Endocrine: Negative.    Genitourinary: Negative for difficulty urinating.   Musculoskeletal: Negative.    Skin:        Intact   Allergic/Immunologic: Negative.    Neurological: Negative for dizziness, speech difficulty and light-headedness.   Hematological: Negative.    Psychiatric/Behavioral: Positive for confusion. Negative for agitation and  sleep disturbance. The patient is not nervous/anxious and is not hyperactive.        Vitals:    07/09/19 1526   BP: 161/90   Pulse: 75   Resp: 16   Temp: 98  F (36.7  C)   SpO2: 94%   Weight: 160 lb (72.6 kg)       Physical Exam   Constitutional: No distress.   More htn noticed, losartan restarted   HENT:   Head: Normocephalic and atraumatic.   Mouth/Throat: Oropharynx is clear and moist. No oropharyngeal exudate.   Eyes: Right eye exhibits no discharge. Left eye exhibits no discharge. No scleral icterus.   Neck: Normal range of motion. Neck supple. No JVD present.   Cardiovascular: Normal rate. Exam reveals no gallop and no friction rub.   No murmur heard.  Pulmonary/Chest: Effort normal. No stridor. No respiratory distress. He has no wheezes. He has no rales.   CTA, RA   Abdominal: Soft. Bowel sounds are normal. He exhibits no distension. There is no tenderness. There is no rebound.   Denies constipation or diarrhea   Genitourinary:   Genitourinary Comments: deferred   Musculoskeletal: He exhibits no edema, tenderness or deformity.   Denies pain, uses wc mainly   Neurological: He is alert. No cranial nerve deficit.   A/O x1-2   Skin: Skin is warm and dry. He is not diaphoretic.   Bruised R arm, improved   Psychiatric: He has a normal mood and affect.   Denies anxiety or depression   Vitals reviewed.      Medication List:  Current Outpatient Medications   Medication Sig     losartan (COZAAR) 50 MG tablet Take 50 mg by mouth daily. Hold for SBP <130 or DBP <85     acetaminophen (TYLENOL) 325 MG tablet Take 650 mg by mouth every 6 (six) hours as needed for pain (Give 2 tablets by mouth every 6 hour as needed for pain).     memantine (NAMENDA) 5 MG tablet Take 5 mg by mouth 2 (two) times a day.     polyethylene glycol (MIRALAX) 17 gram packet Take 17 g by mouth daily as needed (Give 17 gram by mouth as needed daily for constipation. Give by mouth or nasogastric tube).       Labs:  Results for orders placed or  performed in visit on 19   Basic Metabolic Panel   Result Value Ref Range    Sodium 139 136 - 145 mmol/L    Potassium 3.9 3.5 - 5.0 mmol/L    Chloride 106 98 - 107 mmol/L    CO2 26 22 - 31 mmol/L    Anion Gap, Calculation 7 5 - 18 mmol/L    Glucose 81 70 - 125 mg/dL    Calcium 9.1 8.5 - 10.5 mg/dL    BUN 14 8 - 28 mg/dL    Creatinine 0.91 0.70 - 1.30 mg/dL    GFR MDRD Af Amer >60 >60 mL/min/1.73m2    GFR MDRD Non Af Amer >60 >60 mL/min/1.73m2     Mag 1.9  WBC 12.2  Hgb 13.6  MCV 93  TSH 0.60    Assessment/Plan:    Weakness and lethargy: resolved    Pain control: Continue Tylenol 650 every 6 hours as needed    Hypertension: previously losartan weaned off, more htn evident. Now losartan restarted, losartan 50mg daily (hold SBP <130)    Dementia: Continue Namenda 5 mg twice daily    Hypokalemia: last K 3.9 7/3, continue potassium 10mEq daily    Constipation: Continue MiraLAX daily as needed     Disposition: Wife has , now family seeking placement at [facility] .       Electronically signed by: Stewart Garcia NP

## 2021-05-30 NOTE — PROGRESS NOTES
[facility]      Code Status:  FULL CODE  Visit Type: Review Of Multiple Medical Conditions     Facility:  [facility] [[id number]]           History of Present Illness: Wilian Vallejo is a 85 y.o. male who is currently admitted to the TCU.  He has profound dementia due to which placement issues are being looked into.  He was being cared for his wife who recently .  Recheck slums is .  His mood and behaviors have been stable and he remains pleasantly confused  Blood pressures are reviewed.  He has been taken off losartan and blood pressures have been stable.  However for the last few days his blood pressures have rebounded systolics are in the 160s however this diastolics have been consistently greater than 90 as high as 100.  He has been having some poor oral intake.  The been pushing some fluids in him his heart rates have been elevated to and they are wondering if he is getting dehydrated unfortunately  of his dementia he is not a great historian he could not tell me if he had significantly had anything to eat.  He is awaiting placement in long-term care.  Recheck BMP done has been stable with no electrolyte imbalance noted so far        Past Medical History:   Diagnosis Date     BPH (benign prostatic hyperplasia)      Dementia      Essential hypertension      Inguinal hernia without obstruction or gangrene, recurrence not specified, unspecified laterality 2013    Left     Mild cognitive impairment with memory loss      S/P TURP      Weight loss      Past Surgical History:   Procedure Laterality Date     COLONOSCOPY       EYE SURGERY       Fulgurate bleeders      evacuate clots     Laproscopic herniorrhaphy inguinal Bilateral      TRANSURETHRAL RESECTION OF BLADDER      Cytoscopy TURP combined     Family History   Problem Relation Age of Onset     Heart disease Mother      Hypertension Mother      Heart disease Brother         Respiratory     Colon  cancer Maternal Grandmother         colorectal     Social History     Socioeconomic History     Marital status: Patient Declined     Spouse name: Not on file     Number of children: Not on file     Years of education: Not on file     Highest education level: Not on file   Occupational History     Not on file   Social Needs     Financial resource strain: Not on file     Food insecurity:     Worry: Not on file     Inability: Not on file     Transportation needs:     Medical: Not on file     Non-medical: Not on file   Tobacco Use     Smoking status: Never Smoker     Smokeless tobacco: Never Used   Substance and Sexual Activity     Alcohol use: Yes     Comment: 1-2 per week     Drug use: Not on file     Sexual activity: Not on file   Lifestyle     Physical activity:     Days per week: Not on file     Minutes per session: Not on file     Stress: Not on file   Relationships     Social connections:     Talks on phone: Not on file     Gets together: Not on file     Attends Oriental orthodox service: Not on file     Active member of club or organization: Not on file     Attends meetings of clubs or organizations: Not on file     Relationship status: Not on file     Intimate partner violence:     Fear of current or ex partner: Not on file     Emotionally abused: Not on file     Physically abused: Not on file     Forced sexual activity: Not on file   Other Topics Concern     Incontinent Not Asked     Toileting independently Not Asked     Cognitive impairment Not Asked     Vision impairment Not Asked     Hearing impairment Not Asked     Dentures Not Asked   Social History Narrative     Not on file     Current Outpatient Medications   Medication Sig Dispense Refill     acetaminophen (TYLENOL) 325 MG tablet Take 650 mg by mouth every 6 (six) hours as needed for pain (Give 2 tablets by mouth every 6 hour as needed for pain).       memantine (NAMENDA) 5 MG tablet Take 5 mg by mouth 2 (two) times a day.       polyethylene glycol (MIRALAX)  17 gram packet Take 17 g by mouth daily as needed (Give 17 gram by mouth as needed daily for constipation. Give by mouth or nasogastric tube).       No current facility-administered medications for this visit.      No Known Allergies      Review of Systems:    Constitutional: Positive for fatigue  Negative for fever, chills  HENT: Negative for congestion   Eyes: Negative for  visual disturbance.   Respiratory: Negative for cough and chest tightness.    Cardiovascular: Negative for chest pain  and leg swelling.   Gastrointestinal: Negative for nausea, diarrhea, constipation  Genitourinary: Negative.    Musculoskeletal: Negative.    Skin: Positive for abrasion on right forearm  Neurological: Positive for generalized weakness. Negative for dizziness light-headedness and headaches.   Psychiatric/Behavioral: Positive for chronic memory loss      Physical Exam:    /105 ; T 98 ; P 98  Wt 160lb  GENERAL: Sitting in wheelchair, no acute distress. Occasionally falling asleep but awakens to voice   HEENT: pupils are equal, round and EOMI. Oral mucosa is moist.  RESP: Chest symmetric. Regular respiratory rate. No stridor, crackles or consolidations.  CVS: RRR without murmur  ABD: Nondistended, soft. Normoactive bowel sounds  EXTREMITIES: No lower extremity edema.   NEURO: non focal. Oriented to self only  PSYCH: smiling, good eye contact, no obvious hallucinations  SKIN: warm dry intact     Labs:    Recent Results (from the past 240 hour(s))   Basic Metabolic Panel   Result Value Ref Range    Sodium 139 136 - 145 mmol/L    Potassium 3.9 3.5 - 5.0 mmol/L    Chloride 106 98 - 107 mmol/L    CO2 26 22 - 31 mmol/L    Anion Gap, Calculation 7 5 - 18 mmol/L    Glucose 81 70 - 125 mg/dL    Calcium 9.1 8.5 - 10.5 mg/dL    BUN 14 8 - 28 mg/dL    Creatinine 0.91 0.70 - 1.30 mg/dL    GFR MDRD Af Amer >60 >60 mL/min/1.73m2    GFR MDRD Non Af Amer >60 >60 mL/min/1.73m2             Assessment/Plan:   #1 advanced dementia.    #2  hypertension with elevated diastolic blood pressures noted in the TCU now  #3  debilitation with gait instability  #4 hypokalemia ON supplementation  #5 WEAKNESS  #6 suspected dehydration mild    Patient is awaiting placement in a memory care unit.  Cognitive scores remain quite impaired and he is pleasantly confused with no behaviors.  His current slums is 7/30.  He is ambulating about 25 feet currently.  Gait is extremely unsteady and he uses a four-wheel walker  Losartan was recently discontinued due to low blood pressures.   will restart it at 50 mg with hold parameters given based on his blood pressure numbers.  His recheck BMP was stable but I suspect in light of his declining oral intake that he may be getting dehydrated staff advised to push fluids   consider getting a BMP again in a few days just to assess electrolytes in light of his poor oral intake    SOLOMON Marshall    This progress note was completed using Dragon software and there may be grammatical errors.

## 2021-05-30 NOTE — PROGRESS NOTES
[facility]      Facility:    [facility] [[id number]]  Code Status: POLST AVAILABLE      Chief Complaint/Reason for Visit:    Chief Complaint   Patient presents with     Review Of Multiple Medical Conditions       HPI:   Wilian is a 85 y.o. male who is a recent transfer from [facility] with an admission on 5/28/19 and discharge on 5/30/19. He has a PMH of dementia, htn, TURP, who has a wife that has been recently hospitalized at [facility] and his health deteriorated while she was away. He was admitted for weakness and lethargy. He received IVF and thiazide was held. He may need LTC if wife unable to care for him. He was subsequently transferred to the TCU for rehab and possible change of disposition.      Past Medical History:  Past Medical History:   Diagnosis Date     BPH (benign prostatic hyperplasia)      Dementia      Essential hypertension      Inguinal hernia without obstruction or gangrene, recurrence not specified, unspecified laterality 04/02/2013    Left     Mild cognitive impairment with memory loss      S/P TURP      Weight loss            Surgical History:  Past Surgical History:   Procedure Laterality Date     COLONOSCOPY       EYE SURGERY       Fulgurate bleeders      evacuate clots     Laproscopic herniorrhaphy inguinal Bilateral      TRANSURETHRAL RESECTION OF BLADDER      Cytoscopy TURP combined       Family History:   Family History   Problem Relation Age of Onset     Heart disease Mother      Hypertension Mother      Heart disease Brother         Respiratory     Colon cancer Maternal Grandmother         colorectal       Social History:    Social History     Socioeconomic History     Marital status: Patient Declined     Spouse name: Not on file     Number of children: Not on file     Years of education: Not on file     Highest education level: Not on file   Occupational History     Not on file   Social Needs     Financial resource strain: Not on  file     Food insecurity:     Worry: Not on file     Inability: Not on file     Transportation needs:     Medical: Not on file     Non-medical: Not on file   Tobacco Use     Smoking status: Never Smoker     Smokeless tobacco: Never Used   Substance and Sexual Activity     Alcohol use: Yes     Comment: 1-2 per week     Drug use: Not on file     Sexual activity: Not on file   Lifestyle     Physical activity:     Days per week: Not on file     Minutes per session: Not on file     Stress: Not on file   Relationships     Social connections:     Talks on phone: Not on file     Gets together: Not on file     Attends Yazidism service: Not on file     Active member of club or organization: Not on file     Attends meetings of clubs or organizations: Not on file     Relationship status: Not on file     Intimate partner violence:     Fear of current or ex partner: Not on file     Emotionally abused: Not on file     Physically abused: Not on file     Forced sexual activity: Not on file   Other Topics Concern     Incontinent Not Asked     Toileting independently Not Asked     Cognitive impairment Not Asked     Vision impairment Not Asked     Hearing impairment Not Asked     Dentures Not Asked   Social History Narrative     Not on file          Review of Systems   Constitutional: Negative for activity change, appetite change, chills, diaphoresis, fatigue and fever.        No issues   HENT: Negative for congestion and hearing loss.    Eyes: Negative.    Respiratory: Negative for shortness of breath and wheezing.    Cardiovascular: Negative.    Gastrointestinal: Negative.    Endocrine: Negative.    Genitourinary: Negative for difficulty urinating.   Musculoskeletal: Negative.    Skin:        Intact   Allergic/Immunologic: Negative.    Neurological: Negative for dizziness, speech difficulty and light-headedness.   Hematological: Negative.    Psychiatric/Behavioral: Positive for confusion. Negative for agitation and sleep  disturbance. The patient is not nervous/anxious and is not hyperactive.        Vitals:    07/02/19 1947   BP: (!) 167/94   Pulse: 75   Resp: 18   Temp: 97  F (36.1  C)   SpO2: 94%   Weight: 160 lb (72.6 kg)       Physical Exam   Constitutional: No distress.   No acute issues   HENT:   Head: Normocephalic and atraumatic.   Mouth/Throat: Oropharynx is clear and moist. No oropharyngeal exudate.   Eyes: Right eye exhibits no discharge. Left eye exhibits no discharge. No scleral icterus.   Neck: Normal range of motion. Neck supple. No JVD present.   Cardiovascular: Normal rate. Exam reveals no gallop and no friction rub.   No murmur heard.  Pulmonary/Chest: Effort normal. No stridor. No respiratory distress. He has no wheezes. He has no rales.   CTA, RA   Abdominal: Soft. Bowel sounds are normal. He exhibits no distension. There is no tenderness. There is no rebound.   Denies constipation or diarrhea   Genitourinary:   Genitourinary Comments: deferred   Musculoskeletal: He exhibits no edema, tenderness or deformity.   Denies pain, uses wc mainly   Neurological: He is alert. No cranial nerve deficit.   A/O x1-2   Skin: Skin is warm and dry. He is not diaphoretic.   Bruised R arm, improved   Psychiatric: He has a normal mood and affect.   Denies anxiety or depression   Vitals reviewed.      Medication List:  Current Outpatient Medications   Medication Sig     acetaminophen (TYLENOL) 325 MG tablet Take 650 mg by mouth every 6 (six) hours as needed for pain (Give 2 tablets by mouth every 6 hour as needed for pain).     memantine (NAMENDA) 5 MG tablet Take 5 mg by mouth 2 (two) times a day.     polyethylene glycol (MIRALAX) 17 gram packet Take 17 g by mouth daily as needed (Give 17 gram by mouth as needed daily for constipation. Give by mouth or nasogastric tube).       Labs:  Results for orders placed or performed in visit on 06/18/19   Basic Metabolic Panel   Result Value Ref Range    Sodium 140 136 - 145 mmol/L     Potassium 4.4 3.5 - 5.0 mmol/L    Chloride 106 98 - 107 mmol/L    CO2 27 22 - 31 mmol/L    Anion Gap, Calculation 7 5 - 18 mmol/L    Glucose 72 70 - 125 mg/dL    Calcium 9.3 8.5 - 10.5 mg/dL    BUN 13 8 - 28 mg/dL    Creatinine 0.91 0.70 - 1.30 mg/dL    GFR MDRD Af Amer >60 >60 mL/min/1.73m2    GFR MDRD Non Af Amer >60 >60 mL/min/1.73m2     Mag 1.9  WBC 12.2  Hgb 13.6  MCV 93  TSH 0.60    Assessment/Plan:    Weakness and lethargy: resolved    Pain control: Continue Tylenol 650 every 6 hours as needed    Hypertension: dc losartan, SBP <160    Dementia: Continue Namenda 5 mg twice daily    Hypokalemia: last K 4.4 on 19, continue potassium 10mEq daily, recheck BMP pending today    Constipation: Continue MiraLAX daily as needed     Disposition: Wife has , now family seeking placement at [facility] .       Electronically signed by: Stewart Garcia NP

## 2021-05-31 NOTE — PROGRESS NOTES
[facility]      Facility:    [facility] [[id number]]  Code Status: POLST AVAILABLE      Chief Complaint/Reason for Visit:    Chief Complaint   Patient presents with     Review Of Multiple Medical Conditions       HPI:   Wilian is a 86 y.o. male who initially was a transfer from [facility] with an admission on 5/28/19 and discharge on 5/30/19. He has a PMH of dementia, htn, TURP, who has a wife that has been recently hospitalized at [facility] and his health deteriorated while she was away. He was admitted for weakness and lethargy. He received IVF and thiazide was held. He may need LTC if wife unable to care for him. He was subsequently transferred to the TCU for rehab and possible change of disposition.    LTC:   He finished his TCU stay and now resides in LTC as of 6/2019. He is wc bound with no behaviors reported. He has limited concerns. Patient denies pain, headache, chest pain, numbness or tingling, shortest of breath, eating or swallowing concerns, nausea or vomiting, diarrhea or bowel abnormalities, or no new integumentary concerns today.      Past Medical History:  Past Medical History:   Diagnosis Date     BPH (benign prostatic hyperplasia)      Dementia      Essential hypertension      Inguinal hernia without obstruction or gangrene, recurrence not specified, unspecified laterality 04/02/2013    Left     Mild cognitive impairment with memory loss      S/P TURP      Weight loss            Surgical History:  Past Surgical History:   Procedure Laterality Date     COLONOSCOPY       EYE SURGERY       Fulgurate bleeders      evacuate clots     Laproscopic herniorrhaphy inguinal Bilateral      TRANSURETHRAL RESECTION OF BLADDER      Cytoscopy TURP combined       Family History:   Family History   Problem Relation Age of Onset     Heart disease Mother      Hypertension Mother      Heart disease Brother         Respiratory     Colon cancer Maternal Grandmother          colorectal       Social History:    Social History     Socioeconomic History     Marital status: Patient Declined     Spouse name: Not on file     Number of children: Not on file     Years of education: Not on file     Highest education level: Not on file   Occupational History     Not on file   Social Needs     Financial resource strain: Not on file     Food insecurity:     Worry: Not on file     Inability: Not on file     Transportation needs:     Medical: Not on file     Non-medical: Not on file   Tobacco Use     Smoking status: Never Smoker     Smokeless tobacco: Never Used   Substance and Sexual Activity     Alcohol use: Yes     Comment: 1-2 per week     Drug use: Not on file     Sexual activity: Not on file   Lifestyle     Physical activity:     Days per week: Not on file     Minutes per session: Not on file     Stress: Not on file   Relationships     Social connections:     Talks on phone: Not on file     Gets together: Not on file     Attends [employer] service: Not on file     Active member of club or organization: Not on file     Attends meetings of clubs or organizations: Not on file     Relationship status: Not on file     Intimate partner violence:     Fear of current or ex partner: Not on file     Emotionally abused: Not on file     Physically abused: Not on file     Forced sexual activity: Not on file   Other Topics Concern     Incontinent Not Asked     Toileting independently Not Asked     Cognitive impairment Not Asked     Vision impairment Not Asked     Hearing impairment Not Asked     Dentures Not Asked   Social History Narrative     Not on file          Review of Systems   Constitutional: Negative for activity change, appetite change, chills, diaphoresis, fatigue and fever.        No concerns   HENT: Negative for congestion and hearing loss.    Eyes: Negative.    Respiratory: Negative for shortness of breath and wheezing.    Cardiovascular: Negative.    Gastrointestinal: Negative.    Endocrine:  Negative.    Genitourinary: Negative for difficulty urinating.   Musculoskeletal: Negative.    Skin:        Intact   Allergic/Immunologic: Negative.    Neurological: Negative for dizziness, speech difficulty and light-headedness.   Hematological: Negative.    Psychiatric/Behavioral: Positive for confusion. Negative for agitation and sleep disturbance. The patient is not nervous/anxious and is not hyperactive.         Baseline       Vitals:    08/22/19 1145   BP: 161/90   Pulse: (!) 58   Resp: 18   Temp: 97  F (36.1  C)   SpO2: 96%   Weight: 153 lb (69.4 kg)       Physical Exam   Constitutional: No distress.   More htn noticed, losartan restarted previously   HENT:   Head: Normocephalic and atraumatic.   Mouth/Throat: Oropharynx is clear and moist. No oropharyngeal exudate.   Eyes: Right eye exhibits no discharge. Left eye exhibits no discharge. No scleral icterus.   Neck: Normal range of motion. Neck supple. No JVD present.   Cardiovascular: Normal rate. Exam reveals no gallop and no friction rub.   No murmur heard.  Pulmonary/Chest: Effort normal. No stridor. No respiratory distress. He has no wheezes. He has no rales.   CTA, RA   Abdominal: Soft. Bowel sounds are normal. He exhibits no distension. There is no tenderness. There is no rebound.   Denies constipation or diarrhea   Genitourinary:   Genitourinary Comments: deferred   Musculoskeletal: He exhibits no edema, tenderness or deformity.   Denies pain, wc bound   Neurological: He is alert. No cranial nerve deficit.   A/O x1   Skin: Skin is warm and dry. He is not diaphoretic.   intact   Psychiatric: He has a normal mood and affect.   Denies anxiety or depression   Vitals reviewed.      Medication List:  Current Outpatient Medications   Medication Sig     acetaminophen (TYLENOL) 325 MG tablet Take 650 mg by mouth every 6 (six) hours as needed for pain (Give 2 tablets by mouth every 6 hour as needed for pain).     losartan (COZAAR) 50 MG tablet Take 50 mg by  mouth daily. Hold for SBP <130 or DBP <85     memantine (NAMENDA) 5 MG tablet Take 5 mg by mouth 2 (two) times a day.     polyethylene glycol (MIRALAX) 17 gram packet Take 17 g by mouth daily as needed (Give 17 gram by mouth as needed daily for constipation. Give by mouth or nasogastric tube).       Labs:  Results for orders placed or performed in visit on 19   Basic Metabolic Panel   Result Value Ref Range    Sodium 139 136 - 145 mmol/L    Potassium 3.8 3.5 - 5.0 mmol/L    Chloride 105 98 - 107 mmol/L    CO2 27 22 - 31 mmol/L    Anion Gap, Calculation 7 5 - 18 mmol/L    Glucose 76 70 - 125 mg/dL    Calcium 9.4 8.5 - 10.5 mg/dL    BUN 16 8 - 28 mg/dL    Creatinine 0.87 0.70 - 1.30 mg/dL    GFR MDRD Af Amer >60 >60 mL/min/1.73m2    GFR MDRD Non Af Amer >60 >60 mL/min/1.73m2     Lab Results   Component Value Date    WBC 9.6 2019    HGB 13.9 (L) 2019    HCT 42.1 2019    MCV 96 2019     2019     Lab Results   Component Value Date    TSH 0.87 2019     Vitamin B-12   Date Value Ref Range Status   2019 463 213 - 816 pg/mL Final     Vitamin D, Total (25-Hydroxy)   Date Value Ref Range Status   2019 9.5 (L) 30.0 - 80.0 ng/mL Final     Mag 1.9      Assessment/Plan:    Weakness and lethargy: resolved    Pain control: Continue Tylenol 650 every 6 hours as needed, denies pain    Hypertension: previously losartan weaned off, more htn evident. Now losartan restarted,  And currently losartan 25mg daily (hold SBP <130). No recent BP    Dementia: Continue Namenda 5 mg twice daily    Hypokalemia: last K 3.8 on 19, continue potassium 10mEq daily    Vit D def: started on D3 5000U daily    Constipation: Continue MiraLAX daily as needed     Disposition: Wife has , now family was seeking placement at [facility] though now is in [facility] .       Electronically signed by: Stewart Garcia NP

## 2021-06-01 NOTE — PROGRESS NOTES
[facility] Admission note      Patient: Wilian Vallejo  MRN: [id number]  Date of Service: 9/18/2019      [facility] [[id number]]  Reason for Visit     Chief Complaint   Patient presents with     Review Of Multiple Medical Conditions       Code Status     FULL CODE    Assessment     Dementia currently on Namenda SLUMS 7/30  History of hypertension previously he was taken off losartan but now with rebounding blood pressures he is on a low-dose of losartan 50 mg with hold parameters given for low blood pressure  Significant lymphedema bilateral lower extremities  Insomnia reported by staff ongoing for the last few days  History of hypokalemia on potassium supplementation.  Last BMP on 7/11/2019 with a potassium of 3.9  Debilitation generalized weakness    Plan     Patient currently doing well and long-term congested well.  He has dementia and remains pleasantly confused but overall has been stable.  Continue to monitor mood and behaviors due to recent episode of elopement and fall.  Staff was requested to put some interventions in place including frequent toileting.  Due to elevated blood pressures losartan 25 mg daily has been started and that has been effective.  He is on potassium supplementation daily.  He is on vitamin D supplementation also.  Continue to monitor mood and behaviors      History     Patient is a very pleasant 86 y.o. male who is a resident of LT  He has underlying history of dementia and remains pleasantly confused his last slums was 7/30.  He is alert and oriented to himself and has no recall of recent events.  Overall mood has been stable .  He has some behaviors per staff.  In the last 1 month he has had a couple of elopement episodes when he has been found in the elevator.  He had a fall on 9/15/2019 when he was found in the bathroom floor.  He has no recall of those events  He has a history of hypertension and is now on a low-dose of losartan at 25 mg.  He is  tolerating the medication well.  Labs did show a low vitamin D level so he has been started on supplementation.  He also has a history of hypokalemia and is on potassium supplementation recheck potassiums have been stable to    Past Medical History     Active Ambulatory (Non-Hospital) Problems    Diagnosis     Weakness     Alzheimer's dementia without behavioral disturbance     Essential hypertension     Slow transit constipation     Hypokalemia     Past Medical History:   Diagnosis Date     BPH (benign prostatic hyperplasia)      Dementia      Essential hypertension      Inguinal hernia without obstruction or gangrene, recurrence not specified, unspecified laterality 04/02/2013     Mild cognitive impairment with memory loss      S/P TURP      Weight loss        Past Social History     Reviewed, and he  reports that he has never smoked. He has never used smokeless tobacco. He reports that he drinks alcohol.    Family History     Reviewed, and family history includes Colon cancer in his maternal grandmother; Heart disease in his brother and mother; Hypertension in his mother.    Medication List     Current Outpatient Medications on File Prior to Visit   Medication Sig Dispense Refill     acetaminophen (TYLENOL) 325 MG tablet Take 650 mg by mouth every 6 (six) hours as needed for pain (Give 2 tablets by mouth every 6 hour as needed for pain).       losartan (COZAAR) 50 MG tablet Take 25 mg by mouth daily. Hold for SBP <130 or DBP <85              memantine (NAMENDA) 5 MG tablet Take 5 mg by mouth 2 (two) times a day.       polyethylene glycol (MIRALAX) 17 gram packet Take 17 g by mouth daily as needed (Give 17 gram by mouth as needed daily for constipation. Give by mouth or nasogastric tube).       No current facility-administered medications on file prior to visit.        Allergies     No Known Allergies    Review of Systems   A comprehensive review of 14 systems was done. Pertinent findings noted here and in history  of present illness. All the rest negative.  Constitutional: Negative.  Negative for fever, chills, he has activity change, appetite change and fatigue.   HENT: Negative for congestion and facial swelling.    Eyes: Negative for photophobia, redness and visual disturbance.   Respiratory: Negative for cough and chest tightness.    Cardiovascular: Negative for chest pain, palpitations and has significant leg swelling.   Gastrointestinal: Negative for nausea, diarrhea, constipation, blood in stool and abdominal distention.   Genitourinary: Negative.    Musculoskeletal: Negative.  Has difficulty walking does use a walker  Skin: Negative.    Neurological: Negative for dizziness, tremors, syncope, weakness, light-headedness and headaches.   Hematological: Does not bruise/bleed easily.   Psychiatric/Behavioral: Negative.  Fused having significant lymphedema      Physical Exam     Recent Vitals 8/22/2019   Weight 153 lbs   /90   Pulse 58   Temp 97   SpO2 96   Some recent data might be hidden       Constitutional: Oriented to person, and appears well-developed.   HEENT:  Normocephalic and atraumatic.  Eyes: Conjunctivae and EOM are normal. Pupils are equal, round, and reactive to light. No discharge.  No scleral icterus. Nose normal. Mouth/Throat: Oropharynx is clear and moist. No oropharyngeal exudate.    NECK: Normal range of motion. Neck supple. No JVD present. No tracheal deviation present. No thyromegaly present.   CARDIOVASCULAR: Normal rate, regular rhythm and intact distal pulses.  Exam reveals no gallop and no friction rub.  Systolic murmur present.  PULMONARY: Effort normal and breath sounds normal. No respiratory distress.No Wheezing or rales.  ABDOMEN: Soft. Bowel sounds are normal. No distension and no mass.  There is no tenderness. There is no rebound and no guarding. No HSM.  MUSCULOSKELETAL: Normal range of motion. TRACE leg edema and no tenderness. Mild kyphosis, no tenderness.  LYMPH NODES: Has no  cervical, supraclavicular, axillary and groin adenopathy.   NEUROLOGICAL: Alert and oriented to person, . No cranial nerve deficit.  Normal muscle tone. Coordination normal.   GENITOURINARY: Deferred exam.  SKIN: Skin is warm and dry. No rash noted. No erythema. No pallor.   EXTREMITIES: No cyanosis, no clubbing, TRACE leg edema. No Deformity.  PSYCHIATRIC: Normal mood, affect and behavior.  Recall is impaired      Lab Results       Lab Results   Component Value Date    BUN 16 08/14/2019    CALCIUM 9.4 08/14/2019     08/14/2019    CO2 27 08/14/2019    CREATININE 0.87 08/14/2019    GFRAA >60 08/14/2019    GFRNONAA >60 08/14/2019    GLU 76 08/14/2019    HCT 42.1 08/14/2019    WBC 9.6 08/14/2019    HGB 13.9 (L) 08/14/2019    MG 1.9 06/11/2019     08/14/2019    K 3.8 08/14/2019     08/14/2019     Last vitamin D level was 9.5        SOLOMON Marshall

## 2021-06-02 NOTE — PROGRESS NOTES
[facility]      Facility:    [facility] [[id number]]  Code Status: POLST AVAILABLE      Chief Complaint/Reason for Visit:    Chief Complaint   Patient presents with     Review Of Multiple Medical Conditions       HPI:   Wilian is a 86 y.o. male who initially was a transfer from [facility] with an admission on 5/28/19 and discharge on 5/30/19. He has a PMH of dementia, htn, TURP, who has a wife that has been recently hospitalized at [facility] and his health deteriorated while she was away. He was admitted for weakness and lethargy. He received IVF and thiazide was held. He may need LTC if wife unable to care for him. He was subsequently transferred to the TCU for rehab and possible change of disposition.    LTC:   He finished his TCU stay and now resides in LTC as of 6/2019. He is wc bound with no behaviors reported. He has limited concerns. Patient denies pain, headache, chest pain, numbness or tingling, shortest of breath, eating or swallowing concerns, nausea or vomiting, diarrhea or bowel abnormalities, or no new integumentary concerns today. No change today.      Past Medical History:  Past Medical History:   Diagnosis Date     BPH (benign prostatic hyperplasia)      Dementia      Essential hypertension      Inguinal hernia without obstruction or gangrene, recurrence not specified, unspecified laterality 04/02/2013    Left     Mild cognitive impairment with memory loss      S/P TURP      Weight loss            Surgical History:  Past Surgical History:   Procedure Laterality Date     COLONOSCOPY       EYE SURGERY       Fulgurate bleeders      evacuate clots     Laproscopic herniorrhaphy inguinal Bilateral      TRANSURETHRAL RESECTION OF BLADDER      Cytoscopy TURP combined       Family History:   Family History   Problem Relation Age of Onset     Heart disease Mother      Hypertension Mother      Heart disease Brother         Respiratory     Colon cancer Maternal  Grandmother         colorectal       Social History:    Social History     Socioeconomic History     Marital status: Patient Declined     Spouse name: Not on file     Number of children: Not on file     Years of education: Not on file     Highest education level: Not on file   Occupational History     Not on file   Social Needs     Financial resource strain: Not on file     Food insecurity:     Worry: Not on file     Inability: Not on file     Transportation needs:     Medical: Not on file     Non-medical: Not on file   Tobacco Use     Smoking status: Never Smoker     Smokeless tobacco: Never Used   Substance and Sexual Activity     Alcohol use: Yes     Comment: 1-2 per week     Drug use: Not on file     Sexual activity: Not on file   Lifestyle     Physical activity:     Days per week: Not on file     Minutes per session: Not on file     Stress: Not on file   Relationships     Social connections:     Talks on phone: Not on file     Gets together: Not on file     Attends Congregational service: Not on file     Active member of club or organization: Not on file     Attends meetings of clubs or organizations: Not on file     Relationship status: Not on file     Intimate partner violence:     Fear of current or ex partner: Not on file     Emotionally abused: Not on file     Physically abused: Not on file     Forced sexual activity: Not on file   Other Topics Concern     Incontinent Not Asked     Toileting independently Not Asked     Cognitive impairment Not Asked     Vision impairment Not Asked     Hearing impairment Not Asked     Dentures Not Asked   Social History Narrative     Not on file          Review of Systems   Constitutional: Negative for activity change, appetite change, chills, diaphoresis, fatigue and fever.        No concerns   HENT: Negative for congestion and hearing loss.    Eyes: Negative.    Respiratory: Negative for shortness of breath and wheezing.    Cardiovascular: Negative.    Gastrointestinal:  Negative.    Endocrine: Negative.    Genitourinary: Negative for difficulty urinating.   Musculoskeletal: Negative.    Skin:        Intact   Allergic/Immunologic: Negative.    Neurological: Negative for dizziness, speech difficulty and light-headedness.   Hematological: Negative.    Psychiatric/Behavioral: Positive for confusion. Negative for agitation and sleep disturbance. The patient is not nervous/anxious and is not hyperactive.         Baseline       Vitals:    10/27/19 1230   BP: 146/87   Pulse: 86   Resp: 18   Temp: 98  F (36.7  C)   SpO2: 96%   Weight: 152 lb (68.9 kg)       Physical Exam   Constitutional: No distress.   No acute issues   HENT:   Head: Normocephalic and atraumatic.   Mouth/Throat: Oropharynx is clear and moist. No oropharyngeal exudate.   Eyes: Right eye exhibits no discharge. Left eye exhibits no discharge. No scleral icterus.   Neck: Normal range of motion. Neck supple. No JVD present.   Cardiovascular: Normal rate. Exam reveals no gallop and no friction rub.   No murmur heard.  Pulmonary/Chest: Effort normal. No stridor. No respiratory distress. He has no wheezes. He has no rales.   CTA, RA   Abdominal: Soft. Bowel sounds are normal. He exhibits no distension. There is no tenderness. There is no rebound.   Denies constipation or diarrhea   Genitourinary:    Genitourinary Comments: deferred   Musculoskeletal:         General: No tenderness, deformity or edema.      Comments: Denies pain, wc bound     Neurological: He is alert. No cranial nerve deficit.   A/O x1   Skin: Skin is warm and dry. He is not diaphoretic.   intact   Psychiatric: He has a normal mood and affect.   Denies anxiety or depression   Vitals reviewed.      Medication List:  Current Outpatient Medications   Medication Sig     potassium chloride (KLOR-CON) 10 MEQ CR tablet Take 10 mEq by mouth daily.     acetaminophen (TYLENOL) 325 MG tablet Take 650 mg by mouth every 6 (six) hours as needed for pain (Give 2 tablets by mouth  every 6 hour as needed for pain).     losartan (COZAAR) 50 MG tablet Take 25 mg by mouth daily. Hold for SBP <130 or DBP <85            memantine (NAMENDA) 5 MG tablet Take 5 mg by mouth 2 (two) times a day.     polyethylene glycol (MIRALAX) 17 gram packet Take 17 g by mouth daily as needed (Give 17 gram by mouth as needed daily for constipation. Give by mouth or nasogastric tube).       Labs:  Results for orders placed or performed in visit on 19   Basic Metabolic Panel   Result Value Ref Range    Sodium 139 136 - 145 mmol/L    Potassium 3.8 3.5 - 5.0 mmol/L    Chloride 105 98 - 107 mmol/L    CO2 27 22 - 31 mmol/L    Anion Gap, Calculation 7 5 - 18 mmol/L    Glucose 76 70 - 125 mg/dL    Calcium 9.4 8.5 - 10.5 mg/dL    BUN 16 8 - 28 mg/dL    Creatinine 0.87 0.70 - 1.30 mg/dL    GFR MDRD Af Amer >60 >60 mL/min/1.73m2    GFR MDRD Non Af Amer >60 >60 mL/min/1.73m2     Lab Results   Component Value Date    WBC 9.6 2019    HGB 13.9 (L) 2019    HCT 42.1 2019    MCV 96 2019     2019     Lab Results   Component Value Date    TSH 0.87 2019     Vitamin B-12   Date Value Ref Range Status   2019 463 213 - 816 pg/mL Final     Vitamin D, Total (25-Hydroxy)   Date Value Ref Range Status   2019 9.5 (L) 30.0 - 80.0 ng/mL Final     Mag 1.9      Assessment/Plan:    Weakness and lethargy: resolved    Pain control: Continue Tylenol 650 every 6 hours as needed, denies pain    Hypertension: previously losartan weaned off, more htn evident. Now losartan restarted,  And currently losartan 25mg daily. SBP <150    Dementia: Continue Namenda 5 mg twice daily    Hypokalemia: last K 3.8 on 19, continue potassium 10mEq daily    Vit D def: continue D3 5000U daily    Insomnia: melatonin discontinued    Constipation: Continue MiraLAX daily as needed     Disposition: Wife has , now family was seeking placement at [facility] though now resides in [facility] , [facility] .        Electronically signed by: Stewart Garcai NP

## 2021-06-04 NOTE — PROGRESS NOTES
[facility]      Facility:    [facility] [[id number]]  Code Status: POLST AVAILABLE      Chief Complaint/Reason for Visit:    Chief Complaint   Patient presents with     Review Of Multiple Medical Conditions       HPI:   Wilian is a 86 y.o. male who initially was a transfer from [facility] with an admission on 5/28/19 and discharge on 5/30/19. He has a PMH of dementia, htn, TURP, who has a wife that has been recently hospitalized at [facility] and his health deteriorated while she was away. He was admitted for weakness and lethargy. He received IVF and thiazide was held. He may need LTC if wife unable to care for him. He was subsequently transferred to the TCU for rehab and possible change of disposition.    LTC:   He finished his TCU stay and now resides in LTC as of 6/2019 as his wife passed away. He is wc bound with no behaviors reported. He has limited concerns.  His htn is well controlled. No need for medication changes this visit.       Past Medical History:  Past Medical History:   Diagnosis Date     BPH (benign prostatic hyperplasia)      Dementia      Essential hypertension      Inguinal hernia without obstruction or gangrene, recurrence not specified, unspecified laterality 04/02/2013    Left     Mild cognitive impairment with memory loss      S/P TURP      Weight loss            Surgical History:  Past Surgical History:   Procedure Laterality Date     COLONOSCOPY       EYE SURGERY       Fulgurate bleeders      evacuate clots     Laproscopic herniorrhaphy inguinal Bilateral      TRANSURETHRAL RESECTION OF BLADDER      Cytoscopy TURP combined       Family History:   Family History   Problem Relation Age of Onset     Heart disease Mother      Hypertension Mother      Heart disease Brother         Respiratory     Colon cancer Maternal Grandmother         colorectal       Social History:    Social History     Socioeconomic History     Marital status: Patient  Declined     Spouse name: Not on file     Number of children: Not on file     Years of education: Not on file     Highest education level: Not on file   Occupational History     Not on file   Social Needs     Financial resource strain: Not on file     Food insecurity:     Worry: Not on file     Inability: Not on file     Transportation needs:     Medical: Not on file     Non-medical: Not on file   Tobacco Use     Smoking status: Never Smoker     Smokeless tobacco: Never Used   Substance and Sexual Activity     Alcohol use: Yes     Comment: 1-2 per week     Drug use: Not on file     Sexual activity: Not on file   Lifestyle     Physical activity:     Days per week: Not on file     Minutes per session: Not on file     Stress: Not on file   Relationships     Social connections:     Talks on phone: Not on file     Gets together: Not on file     Attends Restorationist service: Not on file     Active member of club or organization: Not on file     Attends meetings of clubs or organizations: Not on file     Relationship status: Not on file     Intimate partner violence:     Fear of current or ex partner: Not on file     Emotionally abused: Not on file     Physically abused: Not on file     Forced sexual activity: Not on file   Other Topics Concern     Incontinent Not Asked     Toileting independently Not Asked     Cognitive impairment Not Asked     Vision impairment Not Asked     Hearing impairment Not Asked     Dentures Not Asked   Social History Narrative     Not on file          Review of Systems   Constitutional: Negative for activity change, appetite change, chills, diaphoresis, fatigue and fever.        No concerns   HENT: Negative for congestion and hearing loss.    Eyes: Negative.    Respiratory: Negative for shortness of breath and wheezing.    Cardiovascular: Negative.    Gastrointestinal: Negative.    Endocrine: Negative.    Genitourinary: Negative for difficulty urinating.   Musculoskeletal: Negative.         Denies  pain   Skin:        Intact   Allergic/Immunologic: Negative.    Neurological: Negative for dizziness, speech difficulty and light-headedness.   Hematological: Negative.    Psychiatric/Behavioral: Positive for confusion. Negative for agitation and sleep disturbance. The patient is not nervous/anxious and is not hyperactive.         Baseline       Vitals:    12/20/19 1813   BP: 132/84   Pulse: 72   Resp: 16   Temp: 98  F (36.7  C)   SpO2: 96%   Weight: 153 lb (69.4 kg)       Physical Exam   Constitutional: No distress.   No acute issues   HENT:   Head: Normocephalic and atraumatic.   Mouth/Throat: Oropharynx is clear and moist. No oropharyngeal exudate.   Eyes: Right eye exhibits no discharge. Left eye exhibits no discharge. No scleral icterus.   Neck: Normal range of motion. Neck supple. No JVD present.   Cardiovascular: Normal rate. Exam reveals no gallop and no friction rub.   No murmur heard.  Pulmonary/Chest: Effort normal. No stridor. No respiratory distress. He has no wheezes. He has no rales.   Dim, RA   Abdominal: Soft. Bowel sounds are normal. He exhibits no distension. There is no abdominal tenderness. There is no rebound.   Denies constipation or diarrhea   Genitourinary:    Genitourinary Comments: deferred     Musculoskeletal:         General: No tenderness, deformity or edema.      Comments: Denies pain, wc bound. Recently his walking program has been discontinued   Neurological: He is alert. No cranial nerve deficit.   A/O x1   Skin: Skin is warm and dry. He is not diaphoretic.   intact   Psychiatric: He has a normal mood and affect.   Denies anxiety or depression   Vitals reviewed.      Medication List:  Current Outpatient Medications   Medication Sig     acetaminophen (TYLENOL) 325 MG tablet Take 650 mg by mouth every 6 (six) hours as needed for pain (Give 2 tablets by mouth every 6 hour as needed for pain).     losartan (COZAAR) 50 MG tablet Take 25 mg by mouth daily. Hold for SBP <130 or DBP <85             memantine (NAMENDA) 5 MG tablet Take 5 mg by mouth 2 (two) times a day.     polyethylene glycol (MIRALAX) 17 gram packet Take 17 g by mouth daily as needed (Give 17 gram by mouth as needed daily for constipation. Give by mouth or nasogastric tube).     potassium chloride (KLOR-CON) 10 MEQ CR tablet Take 10 mEq by mouth daily.       Labs:  Results for orders placed or performed in visit on 10/28/19   Basic Metabolic Panel   Result Value Ref Range    Sodium 137 136 - 145 mmol/L    Potassium 4.2 3.5 - 5.0 mmol/L    Chloride 103 98 - 107 mmol/L    CO2 28 22 - 31 mmol/L    Anion Gap, Calculation 6 5 - 18 mmol/L    Glucose 63 (L) 70 - 125 mg/dL    Calcium 8.8 8.5 - 10.5 mg/dL    BUN 20 8 - 28 mg/dL    Creatinine 1.00 0.70 - 1.30 mg/dL    GFR MDRD Af Amer >60 >60 mL/min/1.73m2    GFR MDRD Non Af Amer >60 >60 mL/min/1.73m2     Lab Results   Component Value Date    WBC 9.6 2019    HGB 13.9 (L) 2019    HCT 42.1 2019    MCV 96 2019     2019     Lab Results   Component Value Date    TSH 0.87 2019     Vitamin B-12   Date Value Ref Range Status   2019 463 213 - 816 pg/mL Final     Vitamin D, Total (25-Hydroxy)   Date Value Ref Range Status   2019 9.5 (L) 30.0 - 80.0 ng/mL Final     Mag 1.9      Assessment/Plan:    Weakness and lethargy: resolved    Pain control: Continue Tylenol 650 every 6 hours as needed, denies pain    Hypertension: previously losartan weaned off, more htn evident. Now losartan restarted,  And currently losartan 25mg daily. SBP <150    Dementia: Continue Namenda 5 mg twice daily, no behaviors reported    Hypokalemia: last K 34.2 on 10/28/19, continue potassium 10mEq daily    Vit D def: continue D3 5000U daily    Insomnia: melatonin discontinued    Constipation: Continue MiraLAX daily as needed     Disposition: Wife has , now family was seeking placement at [facility] though now resides in [facility] .       Electronically signed  by: Stewart Garcia, NP

## 2021-06-05 NOTE — PROGRESS NOTES
[facility] Admission note      Patient: Wilian Vallejo  MRN: [id number]  Date of Service: 1/15/2020      [facility] [[id number]]  Reason for Visit     Chief Complaint   Patient presents with     Review Of Multiple Medical Conditions       Code Status     FULL CODE    Assessment     Acute episode of unresponsiveness for which patient was lowered to the floor.  911 called he was sent to the emergency room work-up reviewed and he has been discharged back in a stable condition  Dementia currently on Namenda [facility] 7/30  History of hypertension now on a low-dose of losartan 25 mg daily  Significant lymphedema bilateral lower extremities  History of hypokalemia on potassium supplementation.  Last BMP on 7/11/2019 with a potassium of 4.2  BPH  Debilitation generalized weakness    Plan     Patient currently doing well and has been stable in long-term care.  Unfortunately he has no recall of recent events what was sent to the emergency room last night because of an unresponsive episode.  He was lowered to the floor.  CPR was attempted and then subsequently was sent to [facility].  His family was contacted who requested that no aggressive interventions be done as he is DNR/DNI however CODE STATUS in the nursing home indicates that he is full code.  Work-up in the emergency room reviewed and mostly normal so has been discharged back in a stable condition.   requested to review CODE STATUS again with family in light of his progressive dementia.  His son had indicated that he was DNR/DNI but patient had requested full code in the [facility] 7/30.  Family is working on alternative placement assisted living facility.  Blood pressures have been stable on a low-dose of losartan 25 mg daily with hold parameters.  Potassium on recheck has been stable.  He is on potassium supplementation.  Insomnia concerns have resolved.  Patient overall is doing well in long-term care but declining slowly  cognitively  Lab and EKG done in the emergency room on yesterday night were reviewed and were normal no further labs needed at this time    History     Patient is a very pleasant 86 y.o. male who is a resident of [facility]  Patient had acute episode of syncope last night.  He was lowered to the floor subsequently he was sent to the emergency room he had extensive work-up EKG was reassuring lab work was stable and he is discharged back he has no recall of the events.  He has underlying history of dementia and remains pleasantly confused his last slums was 7/30.  He is alert and oriented to himself and has no recall of recent events.  Overall mood has been stable .  He has some behaviors per staff.  In the last 1 month he has had a couple of elopement episodes when he has been found in the elevator.  He had a fall on 9/15/2019 when he was found in the bathroom floor.  He has no recall of those events  He has a history of hypertension and is now on a low-dose of losartan at 25 mg.  He is tolerating the medication well.  Labs did show a low vitamin D level so he has been started on supplementation.  He also has a history of hypokalemia and is on potassium supplementation recheck potassiums have been stable to    Past Medical History     Active Ambulatory (Non-Hospital) Problems    Diagnosis     Adjustment insomnia     Weakness     Alzheimer's dementia without behavioral disturbance (H)     Essential hypertension     Slow transit constipation     Hypokalemia     Past Medical History:   Diagnosis Date     BPH (benign prostatic hyperplasia)      Dementia      Essential hypertension      Inguinal hernia without obstruction or gangrene, recurrence not specified, unspecified laterality 04/02/2013     Mild cognitive impairment with memory loss      S/P TURP      Weight loss        Past Social History     Reviewed, and he  reports that he has never smoked. He has never used smokeless tobacco. He reports current alcohol use.    Family  History     Reviewed, and family history includes Colon cancer in his maternal grandmother; Heart disease in his brother and mother; Hypertension in his mother.    Medication List     Current Outpatient Medications on File Prior to Visit   Medication Sig Dispense Refill     acetaminophen (TYLENOL) 325 MG tablet Take 650 mg by mouth every 6 (six) hours as needed for pain (Give 2 tablets by mouth every 6 hour as needed for pain).       losartan (COZAAR) 50 MG tablet Take 25 mg by mouth daily. Hold for SBP <130 or DBP <85              memantine (NAMENDA) 5 MG tablet Take 5 mg by mouth 2 (two) times a day.       polyethylene glycol (MIRALAX) 17 gram packet Take 17 g by mouth daily as needed (Give 17 gram by mouth as needed daily for constipation. Give by mouth or nasogastric tube).       potassium chloride (KLOR-CON) 10 MEQ CR tablet Take 10 mEq by mouth daily.       No current facility-administered medications on file prior to visit.        Allergies     No Known Allergies    Review of Systems   A comprehensive review of 14 systems was done. Pertinent findings noted here and in history of present illness. All the rest negative.  Constitutional: Negative.  Negative for fever, chills, he has activity change, appetite change and fatigue.   HENT: Negative for congestion and facial swelling.    Eyes: Negative for photophobia, redness and visual disturbance.   Respiratory: Negative for cough and chest tightness.    Cardiovascular: Negative for chest pain, palpitations and has significant leg swelling.   Gastrointestinal: Negative for nausea, diarrhea, constipation, blood in stool and abdominal distention.   Genitourinary: Negative.    Musculoskeletal: Negative.  Has difficulty walking does use a walker  Skin: Negative.    Neurological: Negative for dizziness, tremors, syncope, weakness, light-headedness and headaches.   Hematological: Does not bruise/bleed easily.   Psychiatric/Behavioral: Negative.  Fused having significant  lymphedema      Physical Exam     Recent Vitals 12/20/2019   Weight 153 lbs   /84   Pulse 72   Temp 98   SpO2 96   Some recent data might be hidden       Constitutional: Oriented to person, and appears well-developed.   HEENT:  Normocephalic and atraumatic.  Eyes: Conjunctivae and EOM are normal. Pupils are equal, round, and reactive to light. No discharge.  No scleral icterus. Nose normal. Mouth/Throat: Oropharynx is clear and moist. No oropharyngeal exudate.    NECK: Normal range of motion. Neck supple. No JVD present. No tracheal deviation present. No thyromegaly present.   CARDIOVASCULAR: Normal rate, regular rhythm and intact distal pulses.  Exam reveals no gallop and no friction rub.  Systolic murmur present.  PULMONARY: Effort normal and breath sounds normal. No respiratory distress.No Wheezing or rales.  ABDOMEN: Soft. Bowel sounds are normal. No distension and no mass.  There is no tenderness. There is no rebound and no guarding. No HSM.  MUSCULOSKELETAL: Normal range of motion. TRACE leg edema and no tenderness. Mild kyphosis, no tenderness.  LYMPH NODES: Has no cervical, supraclavicular, axillary and groin adenopathy.   NEUROLOGICAL: Alert and oriented to person, . No cranial nerve deficit.  Normal muscle tone. Coordination normal.   GENITOURINARY: Deferred exam.  SKIN: Skin is warm and dry. No rash noted. No erythema. No pallor.   EXTREMITIES: No cyanosis, no clubbing, TRACE leg edema. No Deformity.  PSYCHIATRIC: Normal mood, affect and behavior.  Recall is impaired      Lab Results       Lab Results   Component Value Date    BUN 20 10/28/2019    CALCIUM 8.8 10/28/2019     10/28/2019    CO2 28 10/28/2019    CREATININE 1.00 10/28/2019    GFRAA >60 10/28/2019    GFRNONAA >60 10/28/2019    GLU 63 (L) 10/28/2019    HCT 42.1 08/14/2019    WBC 9.6 08/14/2019    HGB 13.9 (L) 08/14/2019    MG 1.9 06/11/2019     08/14/2019    K 4.2 10/28/2019     10/28/2019     Last vitamin D level was  9.5  Recheck labs in the ER was stable with a creatinine of 1 stable electrolytes  EKG did show sinus bradycardia otherwise and was normal    SOLOMON Marshall

## 2021-06-06 NOTE — PROGRESS NOTES
[facility]      Facility:    [facility] [[id number]]  Code Status: POLST AVAILABLE      Chief Complaint/Reason for Visit:    Chief Complaint   Patient presents with     Review Of Multiple Medical Conditions       HPI:   Wilian is a 86 y.o. male who initially was a transfer from [facility] with an admission on 5/28/19 and discharge on 5/30/19. He has a PMH of dementia, htn, TURP, who has a wife that has been recently hospitalized at [facility] and his health deteriorated while she was away. He was admitted for weakness and lethargy. He received IVF and thiazide was held. He may need LTC if wife unable to care for him. He was subsequently transferred to the TCU for rehab and possible change of disposition.    LTC:   He finished his TCU stay and now resides in LTC as of 6/2019 as his wife passed away. He is wc bound with no behaviors reported. He has limited concerns.  His htn is well controlled. Had some recent falls w/o injury. No other changes needed today.      Past Medical History:  Past Medical History:   Diagnosis Date     BPH (benign prostatic hyperplasia)      Dementia      Essential hypertension      Inguinal hernia without obstruction or gangrene, recurrence not specified, unspecified laterality 04/02/2013    Left     Mild cognitive impairment with memory loss      S/P TURP      Weight loss            Surgical History:  Past Surgical History:   Procedure Laterality Date     COLONOSCOPY       EYE SURGERY       Fulgurate bleeders      evacuate clots     Laproscopic herniorrhaphy inguinal Bilateral      TRANSURETHRAL RESECTION OF BLADDER      Cytoscopy TURP combined       Family History:   Family History   Problem Relation Age of Onset     Heart disease Mother      Hypertension Mother      Heart disease Brother         Respiratory     Colon cancer Maternal Grandmother         colorectal       Social History:    Social History     Socioeconomic History     Marital  status: Patient Declined     Spouse name: Not on file     Number of children: Not on file     Years of education: Not on file     Highest education level: Not on file   Occupational History     Not on file   Social Needs     Financial resource strain: Not on file     Food insecurity:     Worry: Not on file     Inability: Not on file     Transportation needs:     Medical: Not on file     Non-medical: Not on file   Tobacco Use     Smoking status: Never Smoker     Smokeless tobacco: Never Used   Substance and Sexual Activity     Alcohol use: Yes     Comment: 1-2 per week     Drug use: Not on file     Sexual activity: Not on file   Lifestyle     Physical activity:     Days per week: Not on file     Minutes per session: Not on file     Stress: Not on file   Relationships     Social connections:     Talks on phone: Not on file     Gets together: Not on file     Attends Hoahaoism service: Not on file     Active member of club or organization: Not on file     Attends meetings of clubs or organizations: Not on file     Relationship status: Not on file     Intimate partner violence:     Fear of current or ex partner: Not on file     Emotionally abused: Not on file     Physically abused: Not on file     Forced sexual activity: Not on file   Other Topics Concern     Incontinent Not Asked     Toileting independently Not Asked     Cognitive impairment Not Asked     Vision impairment Not Asked     Hearing impairment Not Asked     Dentures Not Asked   Social History Narrative     Not on file          Review of Systems   Constitutional: Negative for activity change, appetite change, chills, diaphoresis, fatigue and fever.        No concerns   HENT: Negative for congestion and hearing loss.    Eyes: Negative.    Respiratory: Negative for shortness of breath and wheezing.    Cardiovascular: Negative.    Gastrointestinal: Negative.    Endocrine: Negative.    Genitourinary: Negative for difficulty urinating.   Musculoskeletal: Negative.  "        Denies pain   Skin:        Intact   Allergic/Immunologic: Negative.    Neurological: Negative for dizziness, speech difficulty and light-headedness.   Hematological: Negative.    Psychiatric/Behavioral: Positive for confusion. Negative for agitation and sleep disturbance. The patient is not nervous/anxious and is not hyperactive.         Baseline       Vitals:    02/28/20 0949   BP: 147/86   Pulse: 65   Resp: 18   Temp: 98  F (36.7  C)   SpO2: 98%   Weight: 148 lb (67.1 kg)   Height: 5' 10\" (1.778 m)       Physical Exam   Constitutional: No distress.   No acute issues   HENT:   Head: Normocephalic and atraumatic.   Mouth/Throat: Oropharynx is clear and moist. No oropharyngeal exudate.   Eyes: Right eye exhibits no discharge. Left eye exhibits no discharge. No scleral icterus.   Neck: Normal range of motion. Neck supple. No JVD present.   Cardiovascular: Normal rate. Exam reveals no gallop and no friction rub.   No murmur heard.  Pulmonary/Chest: Effort normal. No stridor. No respiratory distress. He has no wheezes. He has no rales.   Dim, RA   Abdominal: Soft. Bowel sounds are normal. He exhibits no distension. There is no abdominal tenderness. There is no rebound.   Denies constipation or diarrhea   Genitourinary:    Genitourinary Comments: deferred     Musculoskeletal:         General: No tenderness, deformity or edema.      Comments: Denies pain, wc bound. Recently his walking program has been discontinued   Neurological: He is alert. No cranial nerve deficit.   A/O x1   Skin: Skin is warm and dry. He is not diaphoretic.   intact   Psychiatric: He has a normal mood and affect.   No behaviors reported   Vitals reviewed.      Medication List:  Current Outpatient Medications   Medication Sig     cholecalciferol, vitamin D3, 5,000 unit Tab Take 1 tablet by mouth daily.     acetaminophen (TYLENOL) 325 MG tablet Take 650 mg by mouth every 6 (six) hours as needed for pain (Give 2 tablets by mouth every 6 hour as " needed for pain).     losartan (COZAAR) 50 MG tablet Take 25 mg by mouth daily. Hold for SBP <130 or DBP <85            memantine (NAMENDA) 5 MG tablet Take 5 mg by mouth 2 (two) times a day.     polyethylene glycol (MIRALAX) 17 gram packet Take 17 g by mouth daily as needed (Give 17 gram by mouth as needed daily for constipation. Give by mouth or nasogastric tube).     potassium chloride (KLOR-CON) 10 MEQ CR tablet Take 10 mEq by mouth daily.       Labs:  Results for orders placed or performed in visit on 10/28/19   Basic Metabolic Panel   Result Value Ref Range    Sodium 137 136 - 145 mmol/L    Potassium 4.2 3.5 - 5.0 mmol/L    Chloride 103 98 - 107 mmol/L    CO2 28 22 - 31 mmol/L    Anion Gap, Calculation 6 5 - 18 mmol/L    Glucose 63 (L) 70 - 125 mg/dL    Calcium 8.8 8.5 - 10.5 mg/dL    BUN 20 8 - 28 mg/dL    Creatinine 1.00 0.70 - 1.30 mg/dL    GFR MDRD Af Amer >60 >60 mL/min/1.73m2    GFR MDRD Non Af Amer >60 >60 mL/min/1.73m2     Lab Results   Component Value Date    WBC 9.6 08/14/2019    HGB 13.9 (L) 08/14/2019    HCT 42.1 08/14/2019    MCV 96 08/14/2019     08/14/2019     Lab Results   Component Value Date    TSH 0.87 08/14/2019     Vitamin B-12   Date Value Ref Range Status   08/14/2019 463 213 - 816 pg/mL Final     Vitamin D, Total (25-Hydroxy)   Date Value Ref Range Status   08/14/2019 9.5 (L) 30.0 - 80.0 ng/mL Final     Mag 1.9      Assessment/Plan:    Weakness and lethargy: resolved    Pain control: Continue Tylenol 650 every 6 hours as needed, denies pain    Hypertension: previously losartan weaned off, more htn evident. Now losartan restarted,  And currently losartan 25mg daily. SBP <150    Dementia: Continue Namenda 5 mg twice daily, no behaviors reported    Hypokalemia: last K 34.2 on 10/28/19, continue potassium 10mEq daily    Vit D def: started on D3 5000U daily    Insomnia: melatonin discontinued, no issues reported    Constipation: Continue MiraLAX daily as needed     Disposition: Wife  has , now family was seeking placement at [facility] though now resides in [facility] .       Electronically signed by: Stewart Garcia NP

## 2021-06-07 NOTE — PROGRESS NOTES
[facility] Admission note      Patient: Wilian Vallejo  MRN: [id number]  Date of Service: 4/15/2020      [facility] [[id number]]  Reason for Visit     Chief Complaint   Patient presents with     Problem Visit   Follow-up and bradycardia    Code Status     DNR/DNI    Assessment     Acute episode of unresponsiveness /patient admitted to the hospital and noted to have bradycardia with heart rate in the 40s  Dementia currently on Namenda SLUMS 7/30  History of hypertension now on a low-dose of losartan 25 mg daily; discontinued due to low blood pressures  Hypokalemia on supplementation  BPH  Debilitation generalized weakness    Plan     Patient is a resident of long-term care.  He was sent to the emergency room after nursing reported that he was having ongoing episodes of emesis and unresponsiveness.  Vitals were unstable at that point and in light of advanced dementia and patient being a poor historian but being a full code he was sent to the hospital.  In the hospital he had persistent low blood pressures and heart rates in the 40s.  He was noted to have sinus bradycardia with a first-degree AV block  Cardiology was consulted but since patient was asymptomatic they evaluated him and recommended no further medications.  Family was consulted they agreed to just monitoring they refused any further intervention including pacemaker placement.  Son change his CODE STATUS to DNR/DNI and has been discharged back to the TCU.  Losartan discontinued in the hospital due to low blood pressures  At baseline patient is pleasantly confused and remains a poor historian.  Emergency room and hospital visit notes were reviewed and medication changes were also reviewed  We will also have  consult to discuss with family if they are interested in considering do not hospitalize also for this patient in hospice if he further declines as they have refused any aggressive cares in the hospital and  switched him from full code to DNR/DNI.   consult requested immediately.  Heart rates were reviewed and so far they appear to be stable in the 70s patient is asymptomatic.  Also will defer any lab for now most of his work-up in the hospital was normal    History     Patient is a very pleasant 86 y.o. male who is a resident of [facility]  Patient had acute episode of.  He was laid in his bed unfortunately due to dementia remains a very poor historian.  At that time his vitals were unstable and heart rates were felt to be very low with heart rates in the 40s he was sent to the emergency room.  While in the hospital he was noted to have persistent bradycardia with heart rates in the 40s.  He was somewhat symptomatic with nausea and emesis reported.  Cardiology consultation obtain EKG to showed a first-degree AV block  Family refused further intervention including pacemaker placement  He also has a history of hypertension and remains on a very low-dose of losartan blood pressures in the hospital continue to be low suspected to be secondary to dehydration and poor oral intake and overall decline he has been taken off losartan prior to discharge  He also has a history of hypokalemia and is on potassium supplementation recheck potassiums have been stable to    Past Medical History     Active Ambulatory (Non-Hospital) Problems    Diagnosis     Adjustment insomnia     Weakness     Alzheimer's dementia without behavioral disturbance (H)     Essential hypertension     Slow transit constipation     Hypokalemia     Past Medical History:   Diagnosis Date     BPH (benign prostatic hyperplasia)      Dementia      Essential hypertension      Inguinal hernia without obstruction or gangrene, recurrence not specified, unspecified laterality 04/02/2013     Mild cognitive impairment with memory loss      S/P TURP      Weight loss        Past Social History     Reviewed, and he  reports that he has never smoked. He has never used  smokeless tobacco. He reports current alcohol use.    Family History     Reviewed, and family history includes Colon cancer in his maternal grandmother; Heart disease in his brother and mother; Hypertension in his mother.    Medication List     Current Outpatient Medications on File Prior to Visit   Medication Sig Dispense Refill     acetaminophen (TYLENOL) 325 MG tablet Take 650 mg by mouth every 6 (six) hours as needed for pain (Give 2 tablets by mouth every 6 hour as needed for pain).       cholecalciferol, vitamin D3, 5,000 unit Tab Take 1 tablet by mouth daily.       losartan (COZAAR) 50 MG tablet Take 25 mg by mouth daily. Hold for SBP <130 or DBP <85              memantine (NAMENDA) 5 MG tablet Take 5 mg by mouth 2 (two) times a day.       polyethylene glycol (MIRALAX) 17 gram packet Take 17 g by mouth daily as needed (Give 17 gram by mouth as needed daily for constipation. Give by mouth or nasogastric tube).       potassium chloride (KLOR-CON) 10 MEQ CR tablet Take 10 mEq by mouth daily.       No current facility-administered medications on file prior to visit.    Losartan has been discontinued prior to discharge from the hospital    Allergies     No Known Allergies    Review of Systems   A comprehensive review of 14 systems was done. Pertinent findings noted here and in history of present illness. All the rest negative.  Constitutional: Negative.  Negative for fever, chills, he has activity change, appetite change and fatigue.   HENT: Negative for congestion and facial swelling.    Eyes: Negative for photophobia, redness and visual disturbance.   Respiratory: Negative for cough and chest tightness.    Cardiovascular: Negative for chest pain, palpitations and has NO significant leg swelling.   Gastrointestinal: Negative for nausea, diarrhea, constipation, blood in stool and abdominal distention.   Genitourinary: Negative.    Musculoskeletal: Negative.  Has difficulty walking does use a walker  Skin:  "Negative.    Neurological: Negative for dizziness, tremors, syncope, weakness, light-headedness and headaches.   Hematological: Does not bruise/bleed easily.   Psychiatric/Behavioral: Negative.  CONFused with limited recall      Physical Exam     Recent Vitals 2/28/2020   Height 5' 10\"   Weight 148 lbs   BSA (m2) 1.82 m2   /86   Pulse 65   Temp 98   SpO2 98   Some recent data might be hidden       Constitutional: Oriented to person, and appears well-developed.   HEENT:  Normocephalic and atraumatic.  Eyes: Conjunctivae and EOM are normal. Pupils are equal, round, and reactive to light. No discharge.  No scleral icterus. Nose normal. Mouth/Throat: Oropharynx is clear and moist. No oropharyngeal exudate.    NECK: Normal range of motion. Neck supple. No JVD present. No tracheal deviation present. No thyromegaly present.   CARDIOVASCULAR: Normal rate, regular rhythm and intact distal pulses.  Exam reveals noJVD  PULMONARY: Effort normal and breath sounds normal. No respiratory distress.  ABDOMEN: Soft. Bowel sounds are normal. No distension and no mass.  There is no tenderness. There is no rebound and no guarding. No HSM.  MUSCULOSKELETAL: Normal range of motion. TRACE leg edema and no tenderness. Mild kyphosis, no tenderness.  LYMPH NODES: Has no cervical, supraclavicular, axillary and groin adenopathy.   NEUROLOGICAL: Alert and oriented to person, . No cranial nerve deficit.  Normal muscle tone. Coordination normal.   GENITOURINARY: Deferred exam.  SKIN: Skin is warm and dry. No rash noted. No erythema. No pallor.   EXTREMITIES: No cyanosis, no clubbing, TRACE leg edema. No Deformity.  PSYCHIATRIC: Normal mood, affect and behavior.  Recall is impaired      Lab Results     Labs done in the hospital were reviewed his sodium was 137  K 4.4  Creatinine 1  Hemoglobin was 13  Mag 1.9    EKG showed heart rates in the 40s with a first-degree AV block  CXR -NEG  SOLOMON Marshall   "

## 2021-06-08 NOTE — PROGRESS NOTES
"[facility]   Video Visit    Code Status: DNR    Wilian Vallejo is a 86 y.o. male who is being evaluated via a billable video visit.      The patient has been notified of following:     \"This video visit will be conducted via a call between you and your physician/provider. We have found that certain health care needs can be provided without the need for an in-person physical exam.  This service lets us provide the care you need with a video conversation.  If a prescription is necessary we can send it to the facility team.  If lab work is needed we can place an order through the facility team to have that test done at a later time.    If during the course of the call the physician/provider feels a video visit is not appropriate, you will not be charged for this service.\"     Physician/provider has received verbal consent for a Video Visit from the patient? Yes    Patient would like the video invitation sent by: Send to : NA    Video Start Time: 0830    Chief Complaint/Reason for Visit:  Chief Complaint   Patient presents with     Review Of Multiple Medical Conditions     dementia, Htn       HPI:   Wilian is a 86 y.o. male who initially was a transfer from [facility] with an admission on 5/28/19 and discharge on 5/30/19. He has a PMH of dementia, htn, TURP, who has a wife that has been recently hospitalized at [facility] and his health deteriorated while she was away. He was admitted for weakness and lethargy. He received IVF and thiazide was held. He may need LTC if wife unable to care for him. He was subsequently transferred to the TCU for rehab and possible change of disposition.     LTC:   He finished his TCU stay and now resides in LTC as of 6/2019 as his wife passed away. He is wc bound with no behaviors reported. He has limited concerns.  He has a hx of falls w/o injury. Recently losartan was discontinued per hypotension. No other changes needed today.    I have reviewed and updated the " "patient's Past Medical History, Social History, Family History and Medication List.    ALLERGIES  Patient has no known allergies.    Review of Systems   Constitutional: Negative.         No concerns   HENT: Negative for congestion.    Eyes: Negative.    Respiratory: Negative for shortness of breath.    Cardiovascular: Negative.    Gastrointestinal: Negative.    Endocrine: Negative.    Genitourinary: Negative for difficulty urinating.   Musculoskeletal:        Denies pain   Allergic/Immunologic: Negative.    Neurological: Negative.    Hematological: Negative.    Psychiatric/Behavioral: Positive for confusion. Negative for agitation, hallucinations and sleep disturbance. The patient is not nervous/anxious.        Vitals:    05/29/20 0858   BP: (!) 175/91   Pulse: 67   Resp: 17   Temp: 98  F (36.7  C)   SpO2: 98%   Weight: 150 lb (68 kg)   Height: 5' 10\" (1.778 m)         Labs:  Results for orders placed or performed in visit on 10/28/19   Basic Metabolic Panel   Result Value Ref Range    Sodium 137 136 - 145 mmol/L    Potassium 4.2 3.5 - 5.0 mmol/L    Chloride 103 98 - 107 mmol/L    CO2 28 22 - 31 mmol/L    Anion Gap, Calculation 6 5 - 18 mmol/L    Glucose 63 (L) 70 - 125 mg/dL    Calcium 8.8 8.5 - 10.5 mg/dL    BUN 20 8 - 28 mg/dL    Creatinine 1.00 0.70 - 1.30 mg/dL    GFR MDRD Af Amer >60 >60 mL/min/1.73m2    GFR MDRD Non Af Amer >60 >60 mL/min/1.73m2     Lab Results   Component Value Date    WBC 9.6 08/14/2019    HGB 13.9 (L) 08/14/2019    HCT 42.1 08/14/2019    MCV 96 08/14/2019     08/14/2019     Additional provider notes:    Assessment/Plan:    Weakness and lethargy: resolved     Pain control: Continue Tylenol 650 every 6 hours as needed, denies pain     Hypertension: previously losartan weaned off, more htn evident. Losartan was restarted and now discontinued per hypotension. SBP <160 in evenings       Dementia: Continue Namenda 5 mg twice daily, no behaviors reported     Hypokalemia: last K 34.2 on " 10/28/19, continue potassium 10mEq daily     Vit D def: restarted on D3 5000U daily     Insomnia: melatonin discontinued, no issues reported     Constipation: Continue MiraLAX daily as needed      Disposition: He resides in LT. [facility] 7/30.       Video-Visit Details    Type of service:  Video Visit    Video End Time (time video stopped): 0842    Originating Location (pt. Location):[facility] [[id number]]    Distant Location (provider location):  [facility]     Mode of Communication:  FaceTime Video Conference      Stewart Garcia NP

## 2021-06-09 NOTE — PROGRESS NOTES
[facility] Admission note      Patient: Wilian Vallejo  MRN: [id number]  Date of Service: 7/21/2020      [facility] [[id number]]  Reason for Visit     Chief Complaint   Patient presents with     Review Of Multiple Medical Conditions   Follow-up HIP #    Code Status     DNR/DNI    Assessment     -Acute fall in the nursing home on 6/27/2020; 2 additional falls noted over the weekend   no injuries patient has no recall  -Acute nondisplaced right subcapital hip fracture.  -Pain management  Dementia currently on Namenda SLUMS 7/30  History of hypertension now on a low-dose of losartan 25 mg daily; discontinued due to low blood pressures  Hypokalemia on supplementation  BPH  Debilitation generalized weakness    Plan     Patient examined using a video encounter.  Informed consent taken prior to the encounter.  Location of patient is [facility].  [facility]  Start time 8:55 AM.  End time is 9:10 AM  Patient has currently been in the TCU participating in rehab related to his hip fracture  He is being followed by orthopedics and currently family is elected to pursue conservative treatment no surgical intervention and pain management.  He is resting comfortably and denies any pain.  Unfortunately he remains a very poor historian due to dementia and could not tell me is having severe pain  Nursing will  monitor him for mood behaviors and further falls if he has another fall plan will be to check some labs including a [facility] UC.  Monitor intake  He has had recently 15 pound weight loss because of declining intake and is being monitored closely.  Mood and behaviors have been stable  Care plan reviewed with nursing as patient has no insight into his falls or his skin bruise      History     Patient is a very pleasant 86 y.o. male who is a resident of LTC  Patient unfortunately has advanced dementia due to which she remains a poor historian at best he is  alert to only himself.  Recall remains quite limited    He had a fall subsequently was diagnosed with nondisplaced subcapital hip fracture on 6/27/2020.  Family declined hospitalization.  He was in the nursing home and now has been moved to the TCU for aggressive rehab.  Progress is limited due to profound cognitive impairment and baseline debilitation  He has had 2 falls recently over the weekend.  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 He has never used smokeless tobacco. He reports current alcohol use.    Family History     Reviewed, and family history includes Colon cancer in his maternal grandmother; Heart disease in his brother and mother; Hypertension in his mother.    Medication List     Current Outpatient Medications on File Prior to Visit   Medication Sig Dispense Refill     acetaminophen (TYLENOL) 500 MG tablet Take 1,000 mg by mouth 4 (four) times a day.       cholecalciferol, vitamin D3, 5,000 unit Tab Take 1 tablet by mouth  daily.       memantine (NAMENDA) 5 MG tablet Take 5 mg by mouth 2 (two) times a day.       polyethylene glycol (MIRALAX) 17 gram packet Take 17 g by mouth daily as needed (Give 17 gram by mouth as needed daily for constipation. Give by mouth or nasogastric tube).       potassium chloride (KLOR-CON) 10 MEQ CR tablet Take 10 mEq by mouth daily.       QUEtiapine (SEROQUEL) 25 MG tablet Take 12.5 mg by mouth 2 (two) times a day. Give at 0800 and 1600       traMADoL (ULTRAM) 50 mg tablet Take 1 tablet (50 mg total) by mouth 3 (three) times a day. 30 tablet 2     No current facility-administered medications on file prior to visit.    Losartan has been discontinued prior to discharge from the hospital    Allergies     No Known Allergies    Review of Systems   A comprehensive review of 14 systems was done. Pertinent findings noted here and in history of present illness. All the rest negative.  Constitutional: Negative.  Negative for fever, chills, he has activity change, appetite change and fatigue.   HENT: Negative for congestion and facial swelling.    Eyes: Negative for photophobia, redness and visual disturbance.   Respiratory: Negative for cough and chest tightness.    Cardiovascular: Negative for chest pain, palpitations and has NO significant leg swelling.   Gastrointestinal: Negative for nausea, diarrhea, constipation, blood in stool and abdominal distention.   Genitourinary: Negative.    Musculoskeletal: Negative.  Has difficulty walking does use a walker  Skin: Negative.    Neurological: Negative for dizziness, tremors, syncope, weakness, light-headedness and headaches.   Hematological: Does not bruise/bleed easily.   Psychiatric/Behavioral: Negative.  CONFused with limited recall      Physical Exam     Recent Vitals 7/16/2020   Height -   Weight 136 lbs   BSA (m2) 1.75 m2   /90   Pulse 90   Temp 97   SpO2 95   Some recent data might be hidden   Recheck weight 135 pounds blood pressure is 112/68 temp 98  pulse 80  GENERAL: no acute distress. Cooperative in conversation.   HEENT: pupils are equal, round and reactive. Oral mucosa is moist and intact.  RESP:Chest symmetric. Regular respiratory rate. No stridor.  ABD: Nondistended, soft.  EXTREMITIES: No lower extremity edema.   NEURO: non focal. Alert and oriented X SELF.   PSYCH: within normal limits. No depression or anxiety.  SKIN: warm dry intact , ecchymosis with skin tear of the right forearm noted         Lab Results     Results for orders placed or performed in visit on 06/29/20   Basic Metabolic Panel   Result Value Ref Range    Sodium 137 136 - 145 mmol/L    Potassium 3.7 3.5 - 5.0 mmol/L    Chloride 105 98 - 107 mmol/L    CO2 23 22 - 31 mmol/L    Anion Gap, Calculation 9 5 - 18 mmol/L    Glucose 136 (H) 70 - 125 mg/dL    Calcium 8.1 (L) 8.5 - 10.5 mg/dL    BUN 23 8 - 28 mg/dL    Creatinine 1.06 0.70 - 1.30 mg/dL    GFR MDRD Af Amer >60 >60 mL/min/1.73m2    GFR MDRD Non Af Amer >60 >60 mL/min/1.73m2       Recheck x-ray shows a nondisplaced right subcapital femur fracture with no displacement  SOLOMON Marshall

## 2021-06-09 NOTE — PROGRESS NOTES
[facility]      Facility:    [facility] [[id number]]  Code Status: DNR      Chief Complaint/Reason for Visit:    Chief Complaint   Patient presents with     Review Of Multiple Medical Conditions     pain       HPI:   Wilian is a 86 y.o. male who initially was a transfer from [facility] with an admission on 5/28/19 and discharge on 5/30/19. He has a PMH of dementia, htn, TURP, who has a wife that has been recently hospitalized at [facility] and his health deteriorated while she was away. He was admitted for weakness and lethargy. He received IVF and thiazide was held. He may need LTC if wife unable to care for him. He was subsequently transferred to the TCU for rehab and possible change of disposition.    TCU:  Today will assess vitals, pain control and therapy progress.    LTC:   He finished his TCU stay and now resides in LTC as of 6/2019 as his wife passed away. He is wc bound with no behaviors reported. He has limited concerns.  His htn is well controlled. Had some recent falls w/o injury, though on 6/27/20 he suffered a R hip fx.  Family was consulted and they decided to keep him in the facility with the intent for luiza Galan to guide tx.      Past Medical History:  Past Medical History:   Diagnosis Date     BPH (benign prostatic hyperplasia)      Dementia      Essential hypertension      Inguinal hernia without obstruction or gangrene, recurrence not specified, unspecified laterality 04/02/2013    Left     Mild cognitive impairment with memory loss      S/P TURP      Weight loss            Surgical History:  Past Surgical History:   Procedure Laterality Date     COLONOSCOPY       EYE SURGERY       Fulgurate bleeders      evacuate clots     Laproscopic herniorrhaphy inguinal Bilateral      TRANSURETHRAL RESECTION OF BLADDER      Cytoscopy TURP combined       Family History:   Family History   Problem Relation Age of Onset     Heart disease Mother       Hypertension Mother      Heart disease Brother         Respiratory     Colon cancer Maternal Grandmother         colorectal       Social History:    Social History     Socioeconomic History     Marital status:      Spouse name: Not on file     Number of children: Not on file     Years of education: Not on file     Highest education level: Not on file   Occupational History     Not on file   Social Needs     Financial resource strain: Not on file     Food insecurity     Worry: Not on file     Inability: Not on file     Transportation needs     Medical: Not on file     Non-medical: Not on file   Tobacco Use     Smoking status: Never Smoker     Smokeless tobacco: Never Used   Substance and Sexual Activity     Alcohol use: Yes     Comment: 1-2 per week     Drug use: Not on file     Sexual activity: Not on file   Lifestyle     Physical activity     Days per week: Not on file     Minutes per session: Not on file     Stress: Not on file   Relationships     Social connections     Talks on phone: Not on file     Gets together: Not on file     Attends [employer] service: Not on file     Active member of club or organization: Not on file     Attends meetings of clubs or organizations: Not on file     Relationship status: Not on file     Intimate partner violence     Fear of current or ex partner: Not on file     Emotionally abused: Not on file     Physically abused: Not on file     Forced sexual activity: Not on file   Other Topics Concern     Incontinent Not Asked     Toileting independently Not Asked     Cognitive impairment Not Asked     Vision impairment Not Asked     Hearing impairment Not Asked     Dentures Not Asked   Social History Narrative     Not on file          Review of Systems   Constitutional: Positive for activity change and fatigue. Negative for appetite change, chills, diaphoresis and fever.        No concerns   HENT: Negative for congestion and hearing loss.    Eyes: Negative.    Respiratory: Negative  "for shortness of breath and wheezing.    Cardiovascular: Negative.    Gastrointestinal: Negative.    Endocrine: Negative.    Genitourinary: Negative for difficulty urinating.   Musculoskeletal:        R hip pain controlled   Skin:        Intact   Allergic/Immunologic: Negative.    Neurological: Negative for dizziness, speech difficulty and light-headedness.   Hematological: Negative.    Psychiatric/Behavioral: Positive for confusion. Negative for agitation and sleep disturbance. The patient is not nervous/anxious and is not hyperactive.         Baseline       Vitals:    07/22/20 1315   BP: 138/80   Pulse: 70   Resp: 16   Temp: 97  F (36.1  C)   SpO2: 96%   Weight: 136 lb (61.7 kg)   Height: 5' 10\" (1.778 m)       Physical Exam   Constitutional: No distress.   R hip pain, ortho PA to guide tx   HENT:   Head: Normocephalic and atraumatic.   Mouth/Throat: Oropharynx is clear and moist. No oropharyngeal exudate.   Eyes: Right eye exhibits no discharge. Left eye exhibits no discharge. No scleral icterus.   Neck: Normal range of motion. Neck supple. No JVD present.   Cardiovascular: Normal rate. Exam reveals no gallop and no friction rub.   No murmur heard.  Pulmonary/Chest: Effort normal. No stridor. No respiratory distress.   RA   Abdominal: He exhibits no distension.   No constipation or diarrhea reported   Genitourinary:    Genitourinary Comments: incontinent     Musculoskeletal:         General: No tenderness, deformity or edema.      Comments: R hip pain mostly controlled, WBAT, working with therapy   Neurological: He is alert. No cranial nerve deficit.   A/O x1   Skin: Skin is warm and dry. He is not diaphoretic.   intact   Psychiatric: He has a normal mood and affect.   No behaviors reported   Vitals reviewed.      Medication List:  Current Outpatient Medications   Medication Sig     lidocaine 4 % patch Place 1 patch on the skin daily. Remove and discard patch with 12 hours. Apply to R hip     acetaminophen " (TYLENOL) 500 MG tablet Take 1,000 mg by mouth 4 (four) times a day.     cholecalciferol, vitamin D3, 5,000 unit Tab Take 1 tablet by mouth daily.     memantine (NAMENDA) 5 MG tablet Take 5 mg by mouth 2 (two) times a day.     polyethylene glycol (MIRALAX) 17 gram packet Take 17 g by mouth daily as needed (Give 17 gram by mouth as needed daily for constipation. Give by mouth or nasogastric tube).     potassium chloride (KLOR-CON) 10 MEQ CR tablet Take 10 mEq by mouth daily.     QUEtiapine (SEROQUEL) 25 MG tablet Take 12.5 mg by mouth 2 (two) times a day. Give at 0800 and 1600     traMADoL (ULTRAM) 50 mg tablet Take 1 tablet (50 mg total) by mouth 3 (three) times a day.       Labs:  Results for orders placed or performed in visit on 06/29/20   Basic Metabolic Panel   Result Value Ref Range    Sodium 137 136 - 145 mmol/L    Potassium 3.7 3.5 - 5.0 mmol/L    Chloride 105 98 - 107 mmol/L    CO2 23 22 - 31 mmol/L    Anion Gap, Calculation 9 5 - 18 mmol/L    Glucose 136 (H) 70 - 125 mg/dL    Calcium 8.1 (L) 8.5 - 10.5 mg/dL    BUN 23 8 - 28 mg/dL    Creatinine 1.06 0.70 - 1.30 mg/dL    GFR MDRD Af Amer >60 >60 mL/min/1.73m2    GFR MDRD Non Af Amer >60 >60 mL/min/1.73m2     Lab Results   Component Value Date    WBC 16.4 (H) 06/29/2020    HGB 14.2 06/29/2020    HCT 41.4 06/29/2020    MCV 94 06/29/2020     06/29/2020     Lab Results   Component Value Date    TSH 0.87 08/14/2019     Vitamin B-12   Date Value Ref Range Status   08/14/2019 463 213 - 816 pg/mL Final     Vitamin D, Total (25-Hydroxy)   Date Value Ref Range Status   08/14/2019 9.5 (L) 30.0 - 80.0 ng/mL Final     Mag 1.9      Assessment/Plan:    New R nondisplaced subcapital hip fx suffered on 6/27/20: family wanted him kept in facility, so Lorin GEORGE from  has guided tx. Now WBAT and working with therapy    Pain control: continue tylenol 1000mg four times a day and increase tramadol 50mg to four times a day and lidocaine  patch/gel    Hypertension: previously losartan weaned off, more htn evident. Per monitoring, SBP <150    Dementia: Continue Namenda 5 mg twice daily and continue seroquel 12.5mg two times a day, effective    New leukocytosis: Last white count 16.4 on , possibly due to fracture, UA/UC negative, probably d/t fx    Hypokalemia: last K 3.7 on , continue potassium 10mEq daily.     Vit D def: continue D3 5000U daily    Normocytic anemia: last Hgb 14.2 on     Insomnia: melatonin discontinued, no issues reported    Constipation: Continue MiraLAX daily as needed     Disposition: Wife has , now family was seeking placement at [facility] though now resides in LTC. [facility] .       Electronically signed by: Stewart Garcia NP

## 2021-06-09 NOTE — PROGRESS NOTES
[facility]      Facility:    [facility] [[id number]]  Code Status: DNR      Chief Complaint/Reason for Visit:    Chief Complaint   Patient presents with     Review Of Multiple Medical Conditions       HPI:   Wilian is a 86 y.o. male who initially was a transfer from [facility] with an admission on 5/28/19 and discharge on 5/30/19. He has a PMH of dementia, htn, TURP, who has a [place] that has [place] [facility] and his health deteriorated while she was away. He was admitted for weakness and lethargy. He received IVF and thiazide was held. He may need LTC if wife unable to care for him. He was subsequently transferred to the TCU for rehab and possible change of disposition.    TCU:  Now per therapy need after R hip fx, continued on     LTC:   He finished his TCU stay and now resides in LTC as of 6/2019 as his wife passed away. He is wc bound with no behaviors reported. He has limited concerns.  His htn is well controlled. Had some recent falls w/o injury, though on 6/27/20 he suffered a R hip fx.  Family was consulted and they decided to keep him in the facility with the intent for luiza Galan to guide tx.      Past Medical History:  Past Medical History:   Diagnosis Date     BPH (benign prostatic hyperplasia)      Dementia      Essential hypertension      Inguinal hernia without obstruction or gangrene, recurrence not specified, unspecified laterality 04/02/2013    Left     Mild cognitive impairment with memory loss      S/P TURP      Weight loss            Surgical History:  Past Surgical History:   Procedure Laterality Date     COLONOSCOPY       EYE SURGERY       Fulgurate bleeders      evacuate clots     Laproscopic herniorrhaphy inguinal Bilateral      TRANSURETHRAL RESECTION OF BLADDER      Cytoscopy TURP combined       Family History:   Family History   Problem Relation Age of Onset     Heart disease Mother      Hypertension Mother       Heart disease Brother         Respiratory     Colon cancer Maternal Grandmother         colorectal       Social History:    Social History     Socioeconomic History     Marital status:      Spouse name: Not on file     Number of children: Not on file     Years of education: Not on file     Highest education level: Not on file   Occupational History     Not on file   Social Needs     Financial resource strain: Not on file     Food insecurity     Worry: Not on file     Inability: Not on file     Transportation needs     Medical: Not on file     Non-medical: Not on file   Tobacco Use     Smoking status: Never Smoker     Smokeless tobacco: Never Used   Substance and Sexual Activity     Alcohol use: Yes     Comment: 1-2 per week     Drug use: Not on file     Sexual activity: Not on file   Lifestyle     Physical activity     Days per week: Not on file     Minutes per session: Not on file     Stress: Not on file   Relationships     Social connections     Talks on phone: Not on file     Gets together: Not on file     Attends [employer] service: Not on file     Active member of club or organization: Not on file     Attends meetings of clubs or organizations: Not on file     Relationship status: Not on file     Intimate partner violence     Fear of current or ex partner: Not on file     Emotionally abused: Not on file     Physically abused: Not on file     Forced sexual activity: Not on file   Other Topics Concern     Incontinent Not Asked     Toileting independently Not Asked     Cognitive impairment Not Asked     Vision impairment Not Asked     Hearing impairment Not Asked     Dentures Not Asked   Social History Narrative     Not on file          Review of Systems   Constitutional: Positive for activity change and fatigue. Negative for appetite change, chills, diaphoresis and fever.        No concerns   HENT: Negative for congestion and hearing loss.    Eyes: Negative.    Respiratory: Negative for shortness of  breath and wheezing.    Cardiovascular: Negative.    Gastrointestinal: Negative.    Endocrine: Negative.    Genitourinary: Negative for difficulty urinating.   Musculoskeletal:        R hip pain controlled   Skin:        Intact   Allergic/Immunologic: Negative.    Neurological: Negative for dizziness, speech difficulty and light-headedness.   Hematological: Negative.    Psychiatric/Behavioral: Positive for confusion. Negative for agitation and sleep disturbance. The patient is not nervous/anxious and is not hyperactive.         Baseline       Vitals:    07/16/20 1940   BP: 163/90   Pulse: 90   Resp: 18   Temp: 97  F (36.1  C)   SpO2: 95%   Weight: 136 lb (61.7 kg)       Physical Exam   Constitutional: No distress.   New R hip fx, ortho PA to guide tx   HENT:   Head: Normocephalic and atraumatic.   Mouth/Throat: Oropharynx is clear and moist. No oropharyngeal exudate.   Eyes: Right eye exhibits no discharge. Left eye exhibits no discharge. No scleral icterus.   Neck: Normal range of motion. Neck supple. No JVD present.   Cardiovascular: Normal rate. Exam reveals no gallop and no friction rub.   No murmur heard.  Pulmonary/Chest: Effort normal. No stridor. No respiratory distress. He has no wheezes. He has no rales.   Dim, RA   Abdominal: Soft. Bowel sounds are normal. He exhibits no distension. There is no abdominal tenderness. There is no rebound.   No constipation or diarrhea reported   Genitourinary:    Genitourinary Comments: incontinent     Musculoskeletal:         General: No tenderness, deformity or edema.      Comments: R hip pain, usually wc bound   Neurological: He is alert. No cranial nerve deficit.   A/O x1   Skin: Skin is warm and dry. He is not diaphoretic.   intact   Psychiatric: He has a normal mood and affect.   No behaviors reported   Vitals reviewed.      Medication List:  Current Outpatient Medications   Medication Sig     acetaminophen (TYLENOL) 500 MG tablet Take 1,000 mg by mouth 4 (four) times  a day.     cholecalciferol, vitamin D3, 5,000 unit Tab Take 1 tablet by mouth daily.     memantine (NAMENDA) 5 MG tablet Take 5 mg by mouth 2 (two) times a day.     polyethylene glycol (MIRALAX) 17 gram packet Take 17 g by mouth daily as needed (Give 17 gram by mouth as needed daily for constipation. Give by mouth or nasogastric tube).     potassium chloride (KLOR-CON) 10 MEQ CR tablet Take 10 mEq by mouth daily.     QUEtiapine (SEROQUEL) 25 MG tablet Take 12.5 mg by mouth 2 (two) times a day. Give at 0800 and 1600     traMADoL (ULTRAM) 50 mg tablet Take 1 tablet (50 mg total) by mouth 3 (three) times a day.       Labs:  Results for orders placed or performed in visit on 06/29/20   Basic Metabolic Panel   Result Value Ref Range    Sodium 137 136 - 145 mmol/L    Potassium 3.7 3.5 - 5.0 mmol/L    Chloride 105 98 - 107 mmol/L    CO2 23 22 - 31 mmol/L    Anion Gap, Calculation 9 5 - 18 mmol/L    Glucose 136 (H) 70 - 125 mg/dL    Calcium 8.1 (L) 8.5 - 10.5 mg/dL    BUN 23 8 - 28 mg/dL    Creatinine 1.06 0.70 - 1.30 mg/dL    GFR MDRD Af Amer >60 >60 mL/min/1.73m2    GFR MDRD Non Af Amer >60 >60 mL/min/1.73m2     Lab Results   Component Value Date    WBC 16.4 (H) 06/29/2020    HGB 14.2 06/29/2020    HCT 41.4 06/29/2020    MCV 94 06/29/2020     06/29/2020     Lab Results   Component Value Date    TSH 0.87 08/14/2019     Vitamin B-12   Date Value Ref Range Status   08/14/2019 463 213 - 816 pg/mL Final     Vitamin D, Total (25-Hydroxy)   Date Value Ref Range Status   08/14/2019 9.5 (L) 30.0 - 80.0 ng/mL Final     Mag 1.9      Assessment/Plan:    New R nondisplaced subcapital hip fx suffered on 6/27/20: family wanted him kept in facility, so Lorin GEORGE from  has guided tx. Now WBAT and working with therapy    Pain control: continue tylenol 1000mg four times a day and tramadol 50mg three times a day, next week will start to titrate    Hypertension: previously losartan weaned off, more htn evident.  Will  monitor BP in evening x5d again    Dementia: Continue Namenda 5 mg twice daily and continue seroquel 12.5mg two times a day, effective    New leukocytosis: Last white count 16.4 on , possibly due to fracture, UA/UC negative, probably d/t fx    Hypokalemia: last K 3.7 on , continue potassium 10mEq daily.     Vit D def: continue D3 5000U daily    Normocytic anemia: last Hgb 14.2 on     Insomnia: melatonin discontinued, no issues reported    Constipation: Continue MiraLAX daily as needed     Disposition: Wife has , now family was seeking placement at [facility] though now resides in LT. [facility] .       Electronically signed by: Stewart Garcia NP

## 2021-06-09 NOTE — PROGRESS NOTES
[facility] Admission note      Patient: Wilian Vallejo  MRN: [id number]  Date of Service: 7/14/2020      [facility] [[id number]]  Reason for Visit     Chief Complaint   Patient presents with     Review Of Multiple Medical Conditions   Follow-up HIP #    Code Status     DNR/DNI    Assessment     -Acute fall in the nursing home on 6/27/2020  -Acute nondisplaced right subcapital hip fracture.  -Pain management  Dementia currently on Namenda SLUMS 7/30  History of hypertension now on a low-dose of losartan 25 mg daily; discontinued due to low blood pressures  Hypokalemia on supplementation  BPH  Debilitation generalized weakness    Plan     Patient is a resident of PCU currently where he has been shifted from nursing home to pursue aggressive therapy.  He recently was diagnosed with a right nondisplaced of capital hip fracture on 6/27/2020.  Family has elected to keep him in the TCU.  Recheck x-ray has been done today which shows that this is stable with no further displacement.  He will have a follow-up visit with orthopedics on 7/16/2020 to discuss further treatment goals.  Family is hoping no surgeries I did talk to his daughter on face time in his presence who agrees with the plan and does not want to pursue aggressive intervention   he is a DNR.  They are also hoping not to rehospitalize him if possible.  Since patient could not tell me anything about his pain management I did discuss this with daughter she feels he is comfortable.  He is on scheduled Tylenol tramadol.  Weight loss was reviewed he has had progressive weight loss now down   pounds which is almost a 15 pound weight loss suspect this is because of progressive cognitive impairment.  Nursing has been supplementing his meals with supplements and he will also need cognitive assistance  His daughter present on face time during the visit was updated regarding follow-up and x-ray results also follow-up regarding  orthopedics was discussed and pain management.    Subsequently I had to call his health insurance to discuss need for continued therapy.  He has been denied therapy by his healthcare insurance because of the fact that he had significant needs and did not benefit from it however his prior level of functioning was reviewed with them  He was independent using a walker everywhere now he is requiring 2 person assist with all his transfers at this is a significant decline in mood benefit from therapy  They have agreed to let him have some coverage  Total time spent is 35 minutes with more than 25 minutes spent face-to-face in the presence of patient discussing care plan and also with his health insurance  This is all outlined in my note above        History     Patient is a very pleasant 86 y.o. male who is a resident of [facility]  Patient unfortunately has advanced dementia due to which she remains a poor historian at best he is alert to only himself.  He was examined in the presence of his daughter who was doing a face time with him and care consent was reviewed.  He had a fall subsequently was diagnosed with nondisplaced subcapital hip fracture on 6/27/2020.  Family declined hospitalization.  He was in the nursing home and now has been moved to the TCU for aggressive rehab.  He was seen because staff wanted me to contact his insurance to ensure that he could get adequate coverage so his therapy could resume.  He remains a poor historian however he was face timing his daughter and I did review his pain management and care concerns with them they are happy that he is able to stay in the nursing home and can participate in therapy they are happy with his current pain management as long as he is resting comfortably.  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 He has never used smokeless tobacco. He reports current alcohol use.    Family History     Reviewed, and family history includes Colon cancer in his maternal grandmother; Heart disease in his brother and mother; Hypertension in his mother.    Medication List     Current Outpatient Medications on File Prior to Visit   Medication Sig Dispense Refill     acetaminophen (TYLENOL) 500 MG tablet Take 1,000 mg by mouth 4 (four) times a day.       cholecalciferol, vitamin D3, 5,000 unit Tab Take 1 tablet by mouth daily.       memantine (NAMENDA) 5 MG tablet Take 5 mg by mouth 2 (two) times a day.       polyethylene glycol (MIRALAX) 17 gram packet Take 17 g by mouth daily as needed (Give 17 gram by mouth as needed daily for constipation. Give by mouth or nasogastric tube).       potassium chloride (KLOR-CON) 10 MEQ CR tablet Take 10 mEq by mouth daily.       QUEtiapine (SEROQUEL) 25 MG tablet Take 12.5 mg by mouth 2 (two) times a day. Give at 0800 and 1600       traMADoL (ULTRAM) 50 mg tablet Take 0.5 tablets (25 mg total) by mouth 3 (three) times a day. 30 tablet 2     No current facility-administered medications on file prior to visit.    Losartan has been discontinued prior to discharge from the hospital    Allergies     No Known Allergies    Review of  "Systems   A comprehensive review of 14 systems was done. Pertinent findings noted here and in history of present illness. All the rest negative.  Constitutional: Negative.  Negative for fever, chills, he has activity change, appetite change and fatigue.   HENT: Negative for congestion and facial swelling.    Eyes: Negative for photophobia, redness and visual disturbance.   Respiratory: Negative for cough and chest tightness.    Cardiovascular: Negative for chest pain, palpitations and has NO significant leg swelling.   Gastrointestinal: Negative for nausea, diarrhea, constipation, blood in stool and abdominal distention.   Genitourinary: Negative.    Musculoskeletal: Negative.  Has difficulty walking does use a walker  Skin: Negative.    Neurological: Negative for dizziness, tremors, syncope, weakness, light-headedness and headaches.   Hematological: Does not bruise/bleed easily.   Psychiatric/Behavioral: Negative.  CONFused with limited recall      Physical Exam     Recent Vitals 6/29/2020   Height 5' 10\"   Weight 146 lbs   BSA (m2) 1.81 m2   /60   Pulse 90   Temp 97   SpO2 96   Some recent data might be hidden   Recheck weight 135 pounds blood pressure is 112/68 temp 98 pulse 80    Constitutional: Oriented to person, and appears well-developed.   Frail confused and weak  HEENT:  Normocephalic and atraumatic.  Eyes: Conjunctivae and EOM are normal. Pupils are equal, round, and reactive to light. No discharge.  No scleral icterus. Nose normal. Mouth/Throat: Oropharynx is clear and moist. No oropharyngeal exudate.    NECK: Normal range of motion. Neck supple. No JVD present. No tracheal deviation present. No thyromegaly present.   CARDIOVASCULAR: Normal rate, regular rhythm and intact distal pulses.  Exam reveals noJVD  PULMONARY: Effort normal and breath sounds normal. No respiratory distress.  ABDOMEN: Soft. Bowel sounds are normal. No distension and no mass.  There is no tenderness. There is no rebound and " no guarding. No HSM.  MUSCULOSKELETAL: Normal range of motion. TRACE leg edema and no tenderness. Mild kyphosis, no tenderness.  Does not follow commands and move his legs at all.  He is wheelchair-bound  LYMPH NODES: Has no cervical, supraclavicular, axillary and groin adenopathy.   NEUROLOGICAL: Alert and oriented to person, . No cranial nerve deficit.  Normal muscle tone. Coordination normal.   GENITOURINARY: Deferred exam.  SKIN: Skin is warm and dry. No rash noted. No erythema. No pallor.   EXTREMITIES: No cyanosis, no clubbing, TRACE leg edema. No Deformity.  PSYCHIATRIC: Normal mood, affect and behavior.  Recall is impaired  Speech is nonsensical    Lab Results     Results for orders placed or performed in visit on 06/29/20   Basic Metabolic Panel   Result Value Ref Range    Sodium 137 136 - 145 mmol/L    Potassium 3.7 3.5 - 5.0 mmol/L    Chloride 105 98 - 107 mmol/L    CO2 23 22 - 31 mmol/L    Anion Gap, Calculation 9 5 - 18 mmol/L    Glucose 136 (H) 70 - 125 mg/dL    Calcium 8.1 (L) 8.5 - 10.5 mg/dL    BUN 23 8 - 28 mg/dL    Creatinine 1.06 0.70 - 1.30 mg/dL    GFR MDRD Af Amer >60 >60 mL/min/1.73m2    GFR MDRD Non Af Amer >60 >60 mL/min/1.73m2       Recheck x-ray shows a nondisplaced right subcapital femur fracture with no displacement  SOLOMON Marshall

## 2021-06-09 NOTE — PROGRESS NOTES
[facility]      Facility:    [facility] [[id number]]  Code Status: DNR      Chief Complaint/Reason for Visit:    Chief Complaint   Patient presents with     Problem Visit     R hip fx       HPI:   Wilian is a 86 y.o. male who initially was a transfer from [facility] with an admission on 5/28/19 and discharge on 5/30/19. He has a PMH of dementia, htn, TURP, who has a wife that has been recently hospitalized at [facility] and his health deteriorated while she was away. He was admitted for weakness and lethargy. He received IVF and thiazide was held. He may need LTC if wife unable to care for him. He was subsequently transferred to the TCU for rehab and possible change of disposition.    LTC:   He finished his TCU stay and now resides in LTC as of 6/2019 as his wife passed away. He is wc bound with no behaviors reported. He has limited concerns.  His htn is well controlled. Had some recent falls w/o injury, though on 6/27/20 he suffered a R hip fx.  Family was consulted and they decided to keep him in the facility with the intent for luiza Galan to guide tx.      Past Medical History:  Past Medical History:   Diagnosis Date     BPH (benign prostatic hyperplasia)      Dementia      Essential hypertension      Inguinal hernia without obstruction or gangrene, recurrence not specified, unspecified laterality 04/02/2013    Left     Mild cognitive impairment with memory loss      S/P TURP      Weight loss            Surgical History:  Past Surgical History:   Procedure Laterality Date     COLONOSCOPY       EYE SURGERY       Fulgurate bleeders      evacuate clots     Laproscopic herniorrhaphy inguinal Bilateral      TRANSURETHRAL RESECTION OF BLADDER      Cytoscopy TURP combined       Family History:   Family History   Problem Relation Age of Onset     Heart disease Mother      Hypertension Mother      Heart disease Brother         Respiratory     Colon cancer  Maternal Grandmother         colorectal       Social History:    Social History     Socioeconomic History     Marital status: Patient Declined     Spouse name: Not on file     Number of children: Not on file     Years of education: Not on file     Highest education level: Not on file   Occupational History     Not on file   Social Needs     Financial resource strain: Not on file     Food insecurity     Worry: Not on file     Inability: Not on file     Transportation needs     Medical: Not on file     Non-medical: Not on file   Tobacco Use     Smoking status: Never Smoker     Smokeless tobacco: Never Used   Substance and Sexual Activity     Alcohol use: Yes     Comment: 1-2 per week     Drug use: Not on file     Sexual activity: Not on file   Lifestyle     Physical activity     Days per week: Not on file     Minutes per session: Not on file     Stress: Not on file   Relationships     Social connections     Talks on phone: Not on file     Gets together: Not on file     Attends Judaism service: Not on file     Active member of club or organization: Not on file     Attends meetings of clubs or organizations: Not on file     Relationship status: Not on file     Intimate partner violence     Fear of current or ex partner: Not on file     Emotionally abused: Not on file     Physically abused: Not on file     Forced sexual activity: Not on file   Other Topics Concern     Incontinent Not Asked     Toileting independently Not Asked     Cognitive impairment Not Asked     Vision impairment Not Asked     Hearing impairment Not Asked     Dentures Not Asked   Social History Narrative     Not on file          Review of Systems   Constitutional: Positive for activity change. Negative for appetite change, chills, diaphoresis, fatigue and fever.        New pain in R hip   HENT: Negative for congestion and hearing loss.    Eyes: Negative.    Respiratory: Negative for shortness of breath and wheezing.    Cardiovascular: Negative.   "  Gastrointestinal: Negative.    Endocrine: Negative.    Genitourinary: Negative for difficulty urinating.   Musculoskeletal:        R hip pain   Skin:        Intact   Allergic/Immunologic: Negative.    Neurological: Negative for dizziness, speech difficulty and light-headedness.   Hematological: Negative.    Psychiatric/Behavioral: Positive for confusion. Negative for agitation and sleep disturbance. The patient is not nervous/anxious and is not hyperactive.         Baseline       Vitals:    06/29/20 1913   BP: 160/60   Pulse: 90   Resp: 20   Temp: 97  F (36.1  C)   SpO2: 96%   Weight: 146 lb (66.2 kg)   Height: 5' 10\" (1.778 m)       Physical Exam   Constitutional: No distress.   New R hip fx, ortho PA to guide tx   HENT:   Head: Normocephalic and atraumatic.   Mouth/Throat: Oropharynx is clear and moist. No oropharyngeal exudate.   Eyes: Right eye exhibits no discharge. Left eye exhibits no discharge. No scleral icterus.   Neck: Normal range of motion. Neck supple. No JVD present.   Cardiovascular: Normal rate. Exam reveals no gallop and no friction rub.   No murmur heard.  Pulmonary/Chest: Effort normal. No stridor. No respiratory distress. He has no wheezes. He has no rales.   Dim, RA   Abdominal: Soft. Bowel sounds are normal. He exhibits no distension. There is no abdominal tenderness. There is no rebound.   No constipation or diarrhea reported   Genitourinary:    Genitourinary Comments: incontinent     Musculoskeletal:         General: No tenderness, deformity or edema.      Comments: R hip pain, usually wc bound   Neurological: He is alert. No cranial nerve deficit.   A/O x1   Skin: Skin is warm and dry. He is not diaphoretic.   intact   Psychiatric: He has a normal mood and affect.   No behaviors reported   Vitals reviewed.      Medication List:  Current Outpatient Medications   Medication Sig     acetaminophen (TYLENOL) 500 MG tablet Take 1,000 mg by mouth 4 (four) times a day.     QUEtiapine (SEROQUEL) " 25 MG tablet Take 12.5 mg by mouth 2 (two) times a day. Give at 0800 and 1600     traMADoL (ULTRAM) 50 mg tablet Take 25 mg by mouth 3 (three) times a day.     cholecalciferol, vitamin D3, 5,000 unit Tab Take 1 tablet by mouth daily.     memantine (NAMENDA) 5 MG tablet Take 5 mg by mouth 2 (two) times a day.     polyethylene glycol (MIRALAX) 17 gram packet Take 17 g by mouth daily as needed (Give 17 gram by mouth as needed daily for constipation. Give by mouth or nasogastric tube).     potassium chloride (KLOR-CON) 10 MEQ CR tablet Take 10 mEq by mouth daily.       Labs:  Results for orders placed or performed in visit on 06/29/20   Basic Metabolic Panel   Result Value Ref Range    Sodium 137 136 - 145 mmol/L    Potassium 3.7 3.5 - 5.0 mmol/L    Chloride 105 98 - 107 mmol/L    CO2 23 22 - 31 mmol/L    Anion Gap, Calculation 9 5 - 18 mmol/L    Glucose 136 (H) 70 - 125 mg/dL    Calcium 8.1 (L) 8.5 - 10.5 mg/dL    BUN 23 8 - 28 mg/dL    Creatinine 1.06 0.70 - 1.30 mg/dL    GFR MDRD Af Amer >60 >60 mL/min/1.73m2    GFR MDRD Non Af Amer >60 >60 mL/min/1.73m2     Lab Results   Component Value Date    WBC 16.4 (H) 06/29/2020    HGB 14.2 06/29/2020    HCT 41.4 06/29/2020    MCV 94 06/29/2020     06/29/2020     Lab Results   Component Value Date    TSH 0.87 08/14/2019     Vitamin B-12   Date Value Ref Range Status   08/14/2019 463 213 - 816 pg/mL Final     Vitamin D, Total (25-Hydroxy)   Date Value Ref Range Status   08/14/2019 9.5 (L) 30.0 - 80.0 ng/mL Final     Mag 1.9      Assessment/Plan:    New R nondisplaced subcapital hip fx suffered on 6/27/20: family wants him kept in facility, so Lorin GEORGE from  will guide his tx, will keep him non-weight bearing now    Pain control: tylenol increased to 1000mg four times a day and start tramadol 25mg TID    Hypertension: previously losartan weaned off, more htn evident. Now losartan restarted,  And currently losartan 25mg daily. SBP <150    Dementia: Continue  Namenda 5 mg twice daily and start seroquel 12.5mg two times a day     New leukocytosis: Last white count 16.4 on , possibly due to fracture, will check UA UC as well    Hypokalemia: last K 3.7 on , continue potassium 10mEq daily.     Vit D def: continue D3 5000U daily    Normocytic anemia: last Hgb 14.2 on     Insomnia: melatonin discontinued, no issues reported    Constipation: Continue MiraLAX daily as needed     Disposition: Wife has , now family was seeking placement at [facility] though now resides in [facility] .     The care plan has been reviewed and all orders signed. Changes to care plan, if any, as noted. Otherwise, continue care plan of care.  The total time spent with this patient was 42 minutes, with greater than 50% spent in counseling and coordination of care that included multiple issues per pain control, consult with ortho PA and labs, which lasted 23 minutes.        Electronically signed by: Stewart Garcia NP

## 2021-06-10 NOTE — PROGRESS NOTES
[facility]      Facility:    [facility] [[id number]]  Code Status: DNR      Chief Complaint/Reason for Visit:    Chief Complaint   Patient presents with     Problem Visit     pain       HPI:   Wilian is a 86 y.o. male who initially was a transfer from [facility] with an admission on 5/28/19 and discharge on 5/30/19. He has a PMH of dementia, htn, TURP, who has a wife that has been recently [facility] and his health deteriorated while she was away. He was admitted for weakness and lethargy. He received IVF and thiazide was held. He may need LTC if wife unable to care for him. He was subsequently transferred to the TCU for rehab and possible change of disposition.    LTC:   He finished his TCU stay and now resides in LTC as of 6/2019 as his wife passed away. He is wc bound with no behaviors reported. He has limited concerns.  His htn is well controlled. Had some recent falls w/o injury, though on 6/27/20 he suffered a R hip fx.  Family was consulted and they decided to keep him in the facility with the intent for luiza Galan to guide tx. Recently had another fall and not being able to ambulate with more pain reported.      Past Medical History:  Past Medical History:   Diagnosis Date     BPH (benign prostatic hyperplasia)      Dementia      Essential hypertension      Inguinal hernia without obstruction or gangrene, recurrence not specified, unspecified laterality 04/02/2013    Left     Mild cognitive impairment with memory loss      S/P TURP      Weight loss            Surgical History:  Past Surgical History:   Procedure Laterality Date     COLONOSCOPY       EYE SURGERY       Fulgurate bleeders      evacuate clots     Laproscopic herniorrhaphy inguinal Bilateral      TRANSURETHRAL RESECTION OF BLADDER      Cytoscopy TURP combined       Family History:   Family History   Problem Relation Age of Onset     Heart disease Mother      Hypertension  Mother      Heart disease Brother         Respiratory     Colon cancer Maternal Grandmother         colorectal       Social History:    Social History     Socioeconomic History     Marital status:      Spouse name: Not on file     Number of children: Not on file     Years of education: Not on file     Highest education level: Not on file   Occupational History     Not on file   Social Needs     Financial resource strain: Not on file     Food insecurity     Worry: Not on file     Inability: Not on file     Transportation needs     Medical: Not on file     Non-medical: Not on file   Tobacco Use     Smoking status: Never Smoker     Smokeless tobacco: Never Used   Substance and Sexual Activity     Alcohol use: Yes     Comment: 1-2 per week     Drug use: Not on file     Sexual activity: Not on file   Lifestyle     Physical activity     Days per week: Not on file     Minutes per session: Not on file     Stress: Not on file   Relationships     Social connections     Talks on phone: Not on file     Gets together: Not on file     Attends [employer] service: Not on file     Active member of club or organization: Not on file     Attends meetings of clubs or organizations: Not on file     Relationship status: Not on file     Intimate partner violence     Fear of current or ex partner: Not on file     Emotionally abused: Not on file     Physically abused: Not on file     Forced sexual activity: Not on file   Other Topics Concern     Incontinent Not Asked     Toileting independently Not Asked     Cognitive impairment Not Asked     Vision impairment Not Asked     Hearing impairment Not Asked     Dentures Not Asked   Social History Narrative     Not on file          Review of Systems   Constitutional: Positive for activity change and fatigue. Negative for appetite change, chills, diaphoresis and fever.        More pain per another fall   HENT: Negative for congestion and hearing loss.    Eyes: Negative.    Respiratory:  Negative for shortness of breath and wheezing.    Cardiovascular: Negative.    Gastrointestinal: Negative.    Endocrine: Negative.    Genitourinary: Negative for difficulty urinating.   Musculoskeletal:        More R hip pain, recent fall   Skin:        Intact   Allergic/Immunologic: Negative.    Neurological: Negative for dizziness, speech difficulty and light-headedness.   Hematological: Negative.    Psychiatric/Behavioral: Positive for confusion. Negative for agitation and sleep disturbance. The patient is nervous/anxious. The patient is not hyperactive.         Baseline       Vitals:    07/28/20 1412   BP: (!) 163/100   Pulse: (!) 59   Resp: 16   Temp: 97  F (36.1  C)   SpO2: 96%   Weight: 136 lb (61.7 kg)       Physical Exam   Constitutional: No distress.   More R hip pain per recent fall   HENT:   Head: Normocephalic and atraumatic.   Mouth/Throat: Oropharynx is clear and moist. No oropharyngeal exudate.   Eyes: Right eye exhibits no discharge. Left eye exhibits no discharge. No scleral icterus.   Neck: Normal range of motion. Neck supple. No JVD present.   Cardiovascular: Normal rate. Exam reveals no gallop and no friction rub.   No murmur heard.  Pulmonary/Chest: Effort normal. No stridor. No respiratory distress.   RA   Abdominal: He exhibits no distension.   No constipation or diarrhea reported   Genitourinary:    Genitourinary Comments: incontinent     Musculoskeletal:         General: No tenderness, deformity or edema.      Comments: Another fall, has more pain associated with R hip   Neurological: He is alert. No cranial nerve deficit.   A/O x1   Skin: Skin is warm and dry. He is not diaphoretic.   intact   Psychiatric: He has a normal mood and affect.   No behaviors reported   Vitals reviewed.      Medication List:  Current Outpatient Medications   Medication Sig     oxyCODONE (ROXICODONE) 5 MG immediate release tablet Take 5 mg by mouth 3 (three) times a day. And 5mg q4h PRN     acetaminophen  (TYLENOL) 500 MG tablet Take 1,000 mg by mouth 4 (four) times a day.     cholecalciferol, vitamin D3, 5,000 unit Tab Take 1 tablet by mouth daily.     lidocaine 4 % patch Place 1 patch on the skin daily. Remove and discard patch with 12 hours. Apply to R hip     memantine (NAMENDA) 5 MG tablet Take 5 mg by mouth 2 (two) times a day.     polyethylene glycol (MIRALAX) 17 gram packet Take 17 g by mouth daily as needed (Give 17 gram by mouth as needed daily for constipation. Give by mouth or nasogastric tube).     potassium chloride (KLOR-CON) 10 MEQ CR tablet Take 10 mEq by mouth daily.     QUEtiapine (SEROQUEL) 25 MG tablet Take 12.5 mg by mouth 2 (two) times a day. Give at 0800 and 1600       Labs:  Results for orders placed or performed in visit on 06/29/20   Basic Metabolic Panel   Result Value Ref Range    Sodium 137 136 - 145 mmol/L    Potassium 3.7 3.5 - 5.0 mmol/L    Chloride 105 98 - 107 mmol/L    CO2 23 22 - 31 mmol/L    Anion Gap, Calculation 9 5 - 18 mmol/L    Glucose 136 (H) 70 - 125 mg/dL    Calcium 8.1 (L) 8.5 - 10.5 mg/dL    BUN 23 8 - 28 mg/dL    Creatinine 1.06 0.70 - 1.30 mg/dL    GFR MDRD Af Amer >60 >60 mL/min/1.73m2    GFR MDRD Non Af Amer >60 >60 mL/min/1.73m2     Lab Results   Component Value Date    WBC 16.4 (H) 06/29/2020    HGB 14.2 06/29/2020    HCT 41.4 06/29/2020    MCV 94 06/29/2020     06/29/2020     Lab Results   Component Value Date    TSH 0.87 08/14/2019     Vitamin B-12   Date Value Ref Range Status   08/14/2019 463 213 - 816 pg/mL Final     Vitamin D, Total (25-Hydroxy)   Date Value Ref Range Status   08/14/2019 9.5 (L) 30.0 - 80.0 ng/mL Final     Mag 1.9      Assessment/Plan:    New R nondisplaced subcapital hip fx suffered on 6/27/20: family wanted him kept in facility, so Lorin GEORGE from  has guided tx. He was ambulating with therapy though fell several days ago, since unable to weight bear    Pain control: continue tylenol 1000mg four times a day and lidocaine  patch/gel. DC tramadol and start oxycodone 5mg three times a day with 5mg q4h PRN    Hypertension: previously losartan weaned off, more htn evident. Per monitoring, SBP <150    Dementia: Continue Namenda 5 mg twice daily and continue seroquel 12.5mg two times a day, effective    New leukocytosis: Last white count 16.4 on , possibly due to fracture, UA/UC negative, probably d/t fx    Hypokalemia: last K 3.7 on , continue potassium 10mEq daily.     Vit D def: continue D3 5000U daily    Normocytic anemia: last Hgb 14.2 on     Insomnia: melatonin discontinued, no issues reported    Constipation: Continue MiraLAX daily as needed     Disposition: Wife has , now family was seeking placement at [facility] though now resides in LTC. [facility] .       Electronically signed by: Stewart Garcia NP

## 2021-06-12 NOTE — PROGRESS NOTES
[facility]      Facility:    [facility] [[id number]]  Code Status: DNR      Chief Complaint/Reason for Visit:    Chief Complaint   Patient presents with     Review Of Multiple Medical Conditions     hospice       HPI:   Wilian is a 87 y.o. male who initially was a transfer from [facility] with an admission on 5/28/19 and discharge on 5/30/19. He has a PMH of dementia, htn, TURP, who has a [place] that [place] [facility] [place] deteriorated while she was away. He was admitted for weakness and lethargy. He received IVF and thiazide was held. He may need LTC if wife unable to care for him. He was subsequently transferred to the TCU for rehab and possible change of disposition.    LTC:   He finished his TCU stay and now resides in LTC as of 6/2019 as his wife passed away. He is wc bound with no behaviors reported. He has limited concerns.  His htn is well controlled. Had some recent falls w/o injury, though on 6/27/20 he suffered a R hip fx.  Family was consulted and they decided to keep him in facility with tx guidance from ortho PA. Today he continues on hospice with no issues.       Past Medical History:  Past Medical History:   Diagnosis Date     BPH (benign prostatic hyperplasia)      Dementia      Essential hypertension      Inguinal hernia without obstruction or gangrene, recurrence not specified, unspecified laterality 04/02/2013    Left     Mild cognitive impairment with memory loss      S/P TURP      Weight loss            Surgical History:  Past Surgical History:   Procedure Laterality Date     COLONOSCOPY       EYE SURGERY       Fulgurate bleeders      evacuate clots     Laproscopic herniorrhaphy inguinal Bilateral      TRANSURETHRAL RESECTION OF BLADDER      Cytoscopy TURP combined       Family History:   Family History   Problem Relation Age of Onset     Heart disease Mother      Hypertension Mother      Heart disease Brother          Respiratory     Colon cancer Maternal Grandmother         colorectal       Social History:    Social History     Socioeconomic History     Marital status:      Spouse name: Not on file     Number of children: Not on file     Years of education: Not on file     Highest education level: Not on file   Occupational History     Not on file   Social Needs     Financial resource strain: Not on file     Food insecurity     Worry: Not on file     Inability: Not on file     Transportation needs     Medical: Not on file     Non-medical: Not on file   Tobacco Use     Smoking status: Never Smoker     Smokeless tobacco: Never Used   Substance and Sexual Activity     Alcohol use: Yes     Comment: 1-2 per week     Drug use: Not on file     Sexual activity: Not on file   Lifestyle     Physical activity     Days per week: Not on file     Minutes per session: Not on file     Stress: Not on file   Relationships     Social connections     Talks on phone: Not on file     Gets together: Not on file     Attends Jew service: Not on file     Active member of club or organization: Not on file     Attends meetings of clubs or organizations: Not on file     Relationship status: Not on file     Intimate partner violence     Fear of current or ex partner: Not on file     Emotionally abused: Not on file     Physically abused: Not on file     Forced sexual activity: Not on file   Other Topics Concern     Incontinent Not Asked     Toileting independently Not Asked     Cognitive impairment Not Asked     Vision impairment Not Asked     Hearing impairment Not Asked     Dentures Not Asked   Social History Narrative     Not on file          Review of Systems   Constitutional: Positive for activity change. Negative for appetite change, chills, diaphoresis, fatigue and fever.        No concerns   HENT: Negative for congestion and hearing loss.    Eyes: Negative.    Respiratory: Negative for shortness of breath and wheezing.     Cardiovascular: Negative.    Gastrointestinal: Negative.    Endocrine: Negative.    Genitourinary: Negative for difficulty urinating.   Musculoskeletal:        More R hip pain, recent fall   Skin:        Intact   Allergic/Immunologic: Negative.    Neurological: Negative for dizziness, speech difficulty and light-headedness.   Hematological: Negative.    Psychiatric/Behavioral: Positive for confusion. Negative for agitation and sleep disturbance. The patient is not nervous/anxious and is not hyperactive.         Baseline       Vitals:    10/21/20 1453   BP: 124/75   Pulse: 61   Resp: 16   Temp: 97  F (36.1  C)   SpO2: 98%   Weight: 127 lb (57.6 kg)       Physical Exam   Constitutional: No distress.   Hospice care   HENT:   Head: Normocephalic and atraumatic.   Mouth/Throat: Oropharynx is clear and moist. No oropharyngeal exudate.   Eyes: Right eye exhibits no discharge. Left eye exhibits no discharge. No scleral icterus.   Neck: Normal range of motion. Neck supple. No JVD present.   Cardiovascular: Normal rate.   Pulmonary/Chest: Effort normal. No stridor. No respiratory distress.   RA   Abdominal: He exhibits no distension.   No constipation or diarrhea reported   Genitourinary:    Genitourinary Comments: incontinent     Musculoskeletal:         General: No edema.      Comments: Denies pain, [facility] lift   Neurological: He is alert. No cranial nerve deficit.   A/O x1   Skin: Skin is warm and dry. He is not diaphoretic.   intact   Psychiatric: He has a normal mood and affect.   No behaviors reported   Vitals reviewed.      Medication List:  Current Outpatient Medications   Medication Sig     acetaminophen (TYLENOL) 500 MG tablet Take 1,000 mg by mouth 4 (four) times a day.     lidocaine 4 % patch Place 1 patch on the skin daily. Remove and discard patch with 12 hours. Apply to R hip     oxyCODONE (ROXICODONE) 5 MG immediate release tablet Take 1 tablet (5 mg total) by mouth 3 (three) times a day. And 5mg q4h PRN  (Patient taking differently: Take 2.5 mg by mouth 2 (two) times a day. And 2.5mg q4h PRN)     polyethylene glycol (MIRALAX) 17 gram packet Take 17 g by mouth daily as needed (Give 17 gram by mouth as needed daily for constipation. Give by mouth or nasogastric tube).     QUEtiapine (SEROQUEL) 25 MG tablet Take 12.5 mg by mouth 2 (two) times a day. Give at 0800 and 1600       Labs:  Results for orders placed or performed in visit on 09/21/20   Basic Metabolic Panel   Result Value Ref Range    Sodium 136 136 - 145 mmol/L    Potassium 4.1 3.5 - 5.0 mmol/L    Chloride 107 98 - 107 mmol/L    CO2 22 22 - 31 mmol/L    Anion Gap, Calculation 7 5 - 18 mmol/L    Glucose 84 70 - 125 mg/dL    Calcium 9.0 8.5 - 10.5 mg/dL    BUN 24 8 - 28 mg/dL    Creatinine 1.00 0.70 - 1.30 mg/dL    GFR MDRD Af Amer >60 >60 mL/min/1.73m2    GFR MDRD Non Af Amer >60 >60 mL/min/1.73m2     Lab Results   Component Value Date    WBC 16.4 (H) 06/29/2020    HGB 14.2 06/29/2020    HCT 41.4 06/29/2020    MCV 94 06/29/2020     06/29/2020     Lab Results   Component Value Date    TSH 0.87 08/14/2019     Vitamin B-12   Date Value Ref Range Status   08/14/2019 463 213 - 816 pg/mL Final     Vitamin D, Total (25-Hydroxy)   Date Value Ref Range Status   08/14/2019 9.5 (L) 30.0 - 80.0 ng/mL Final     Mag 1.9      Assessment/Plan:    New R nondisplaced subcapital hip fx suffered on 6/27/20: family wanted him kept in facility, so Lorin GEORGE from  has guided tx. Now no pain associated, uses [facility] for transfers    Pain control: continue tylenol 1000mg four times a day and lidocaine patch/gel. Per stability will decrease oxycodone 2.5mg to two times a day with 2.5mg q4h PRN    Hypertension: previously losartan weaned off, more htn evident. Per monitoring, SBP <150    Dementia: continue seroquel 12.5mg two times a day, discontinue namenda    New leukocytosis: Last white count 16.4 on 6/29, possibly due to fracture, UA/UC negative, probably d/t  fx    Hypokalemia: last K 3.7 on 6/29, discontinue potassium    Vit D def: continue D3 5000U daily, discontinued supplement    Normocytic anemia: last Hgb 14.2 on 6/29    Insomnia: melatonin discontinued, no issues reported    Constipation: Continue MiraLAX daily as needed     Disposition: Now resides in LTC. Ros lift with all transfers. WC bound. SLUMS 7/30.     Electronically signed by: Stewart Garcia NP     Assessment and Plan:     Patient has been advised of split billing requirements and indicates understanding: NA    The patient's current medical problems were reviewed.    I have had an Advance Directives discussion with the patient.  The following health maintenance schedule was reviewed with the patient and provided in printed form in the after visit summary:   Health Maintenance   Topic Date Due     TD 18+ HE  08/13/1951     ADVANCE CARE PLANNING  08/13/1951     ZOSTER VACCINES (1 of 2) 08/13/1983     Pneumococcal Vaccine: 65+ Years (1 of 1 - PPSV23) 08/13/1998     INFLUENZA VACCINE RULE BASED (1) 08/01/2020     MEDICARE ANNUAL WELLNESS VISIT  10/21/2021     FALL RISK ASSESSMENT  10/21/2021     Pneumococcal Vaccine: Pediatrics (0 to 5 Years) and At-Risk Patients (6 to 64 Years)  Aged Out     HEPATITIS B VACCINES  Aged Out        Subjective:   Chief Complaint: Wilian Vallejo is an 87 y.o. male here for an Annual Wellness visit.   HPI:  See above    Review of Systems:  Please see above.  .    Patient Care Team:  Stewart Garcia NP as PCP - General (Nurse Practitioner)  Stewart Garcia NP as Assigned PCP  [facility] (Generic Provider)     PHYSICAL EXAM  See above    Assessment Results 10/21/2020   Activities of Daily Living 5-6 - Severe functional impairment   Instrumental Activities of Daily Living 5-6 - Severe functional impairment   Get Up and Go Score 12 seconds or more   Mini Cog Total Score 1     A Mini-Cog score of 0-2  suggests the possibility of dementia, score of 3-5 suggests no dementia  Cognitive Screen not completed due to known dementia.  Fall Risk not completed for medical reasons.    Identified Health Risks:     He is at risk for lack of exercise and has been provided with information to increase physical activity for the benefit of his well-being.  The patient reports that he has difficulty with activities of daily living and lives in LTC in memory care.   The patient reports that he has difficulty with instrumental activities of daily living and lives in memory in LTC.  Patient's advanced directive was discussed and I am comfortable with the patient's wishes.

## 2021-06-13 NOTE — PROGRESS NOTES
[facility] Admission note      Patient: Wilian Vallejo  MRN: [id number]  Date of Service: 11/18/2020      [facility] [[id number]]  Reason for Visit     Chief Complaint   Patient presents with     Review Of Multiple Medical Conditions   Follow-up dementia    Code Status     DNR/DNI    Assessment     Progressive weight loss with failure to thrive now on hospice cares  Advanced dementia   History of hypertension now on a low-dose of losartan 25 mg daily; discontinued due to low blood pressures  Hypokalemia on supplementation  BPH  Debilitation generalized weakness  History of a hip fracture treated conservatively in the TCU  -History of recurrent falls    Plan     Patient is currently long-term care.  He has advanced dementia and is wheelchair-bound and remains a poor historian.  No pain concerns could be elicited from him because of confusion  He is a failure to thrive with progressive weight loss weights are down to 128 pounds down from 150 pounds recently.  Oral intake continues to be poor  Due to progressive decline cognitively and physically he has been on hospice cares.  Continues to have behaviors including resisting cares as well as being physically aggressive with staff members.  His mood and behaviors are being monitored and he is on hospice for support      History     Patient is a very pleasant 87 y.o. male who is a resident of LTC  Patient unfortunately has advanced dementia due to which she remains a poor historian at best he is alert to only himself.  Recall remains quite limited  He continues to have significant behaviors and remains resistive to care he gets paranoid and has been very difficult to deal with as per staff he will refuse cares often and sometimes even refuse medications    He had a fall subsequently was diagnosed with nondisplaced subcapital hip fracture on 6/27/2020.  Family declined hospitalization.  He was in the nursing home and now has been moved  to the TCU for aggressive rehab.  Progress is limited due to profound cognitive impairment and baseline debilitation  Pain management has been optimized using hospice support.  He is on oxycodone in addition Lidoderm patches are being used in his hip which has been effective  Oral intake has been variable with progressive weight loss reported  Weights are currently down to 126 pounds  Is almost a weight pound of 25 pounds recently he used to weigh almost 150 pounds.  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 He has never used smokeless tobacco. He reports current alcohol use.    Family History     Reviewed, and family history includes Colon cancer in his maternal grandmother; Heart disease in his brother and mother; Hypertension in his mother.    Medication List     Current Outpatient Medications on File Prior to Visit   Medication Sig Dispense Refill     acetaminophen (TYLENOL) 500 MG tablet Take 1,000 mg by mouth 4 (four) times a day.       lidocaine 4 % patch Place 1 patch on the skin daily. Remove and discard patch with 12 hours. Apply to R hip       oxyCODONE (ROXICODONE) 5 MG immediate release tablet Take 1 tablet (5 mg total) by mouth 3 (three) times a day. And 5mg q4h PRN (Patient taking differently:  Take 2.5 mg by mouth 2 (two) times a day. And 2.5mg q4h PRN) 30 tablet 0     polyethylene glycol (MIRALAX) 17 gram packet Take 17 g by mouth daily as needed (Give 17 gram by mouth as needed daily for constipation. Give by mouth or nasogastric tube).       QUEtiapine (SEROQUEL) 25 MG tablet Take 12.5 mg by mouth 2 (two) times a day. Give at 0800 and 1600       No current facility-administered medications on file prior to visit.    Losartan has been discontinued prior to discharge from the hospital    Allergies     No Known Allergies    Review of Systems   A comprehensive review of 14 systems was done. Pertinent findings noted here and in history of present illness. All the rest negative.  Constitutional: Negative.  Negative for fever, chills, he has activity change, appetite change and fatigue.   HENT: Negative for congestion and facial swelling.    Eyes: Negative for photophobia, redness and visual disturbance.   Respiratory: Negative for cough and chest tightness.    Cardiovascular: Negative for chest pain, palpitations and has NO significant leg swelling.   Gastrointestinal: Negative for nausea, diarrhea, constipation, blood in stool and abdominal distention.   Genitourinary: Negative.    Musculoskeletal: Negative.  Has difficulty walking does use a walker  Skin: Negative.    Neurological: Negative for dizziness, tremors, syncope, weakness, light-headedness and headaches.   Hematological: Does not bruise/bleed easily.   Psychiatric/Behavioral: Negative.  CONFused with limited recall      Physical Exam     Recent Vitals 10/21/2020   Height -   Weight 127 lbs   BSA (m2) 1.69 m2   /75   Pulse 61   Temp 97   SpO2 98   Some recent data might be hidden     GENERAL: no acute distress. Cooperative in conversation.   HEENT: pupils are equal, round and reactive. Oral mucosa is moist and intact.  RESP:Chest symmetric. Regular respiratory rate. No stridor.  ABD: Nondistended, soft.  EXTREMITIES: No lower extremity  edema.   NEURO: non focal. Alert and oriented X SELF.   PSYCH: within normal limits. No depression or anxiety.  SKIN: warm dry intact , ecchymosis with skin tear of the right forearm noted         Lab Results     Results for orders placed or performed in visit on 09/21/20   Basic Metabolic Panel   Result Value Ref Range    Sodium 136 136 - 145 mmol/L    Potassium 4.1 3.5 - 5.0 mmol/L    Chloride 107 98 - 107 mmol/L    CO2 22 22 - 31 mmol/L    Anion Gap, Calculation 7 5 - 18 mmol/L    Glucose 84 70 - 125 mg/dL    Calcium 9.0 8.5 - 10.5 mg/dL    BUN 24 8 - 28 mg/dL    Creatinine 1.00 0.70 - 1.30 mg/dL    GFR MDRD Af Amer >60 >60 mL/min/1.73m2    GFR MDRD Non Af Amer >60 >60 mL/min/1.73m2       Recheck x-ray shows a nondisplaced right subcapital femur fracture with no displacement  SOLOMON Marshall

## 2021-06-14 NOTE — PROGRESS NOTES
[facility]      Facility:    [facility] [[id number]]  Code Status: DNR      Chief Complaint/Reason for Visit:    Chief Complaint   Patient presents with     Review Of Multiple Medical Conditions     hospice       HPI:   Wilian is a 87 y.o. male who initially was a transfer from [facility] with an admission on 5/28/19 and discharge on 5/30/19. He has a PMH of dementia, htn, TURP, who has a wife that has been recently hospitalized at [facility] and his health deteriorated while she was away. He was admitted for weakness and lethargy. He received IVF and thiazide was held. He may need LTC if wife unable to care for him. He was subsequently transferred to the TCU for rehab and possible change of disposition.    LTC:   He finished his TCU stay and now resides in LTC as of 6/2019 as his wife passed away. He is wc bound with no behaviors reported. He has limited concerns.  His htn is well controlled. Had some recent falls w/o injury, though on 6/27/20 he suffered a R hip fx.  Family was consulted and they decided to keep him in facility with tx guidance from ortho PA. Today he continues on hospice care. He denies pain and needs no medications changes today.      Past Medical History:  Past Medical History:   Diagnosis Date     BPH (benign prostatic hyperplasia)      Dementia      Essential hypertension      Inguinal hernia without obstruction or gangrene, recurrence not specified, unspecified laterality 04/02/2013    Left     Mild cognitive impairment with memory loss      S/P TURP      Weight loss            Surgical History:  Past Surgical History:   Procedure Laterality Date     COLONOSCOPY       EYE SURGERY       Fulgurate bleeders      evacuate clots     Laproscopic herniorrhaphy inguinal Bilateral      TRANSURETHRAL RESECTION OF BLADDER      Cytoscopy TURP combined       Family History:   Family History   Problem Relation Age of Onset     Heart disease Mother       Hypertension Mother      Heart disease Brother         Respiratory     Colon cancer Maternal Grandmother         colorectal       Social History:    Social History     Socioeconomic History     Marital status:      Spouse name: Not on file     Number of children: Not on file     Years of education: Not on file     Highest education level: Not on file   Occupational History     Not on file   Social Needs     Financial resource strain: Not on file     Food insecurity     Worry: Not on file     Inability: Not on file     Transportation needs     Medical: Not on file     Non-medical: Not on file   Tobacco Use     Smoking status: Never Smoker     Smokeless tobacco: Never Used   Substance and Sexual Activity     Alcohol use: Yes     Comment: 1-2 per week     Drug use: Not on file     Sexual activity: Not on file   Lifestyle     Physical activity     Days per week: Not on file     Minutes per session: Not on file     Stress: Not on file   Relationships     Social connections     Talks on phone: Not on file     Gets together: Not on file     Attends Muslim service: Not on file     Active member of club or organization: Not on file     Attends meetings of clubs or organizations: Not on file     Relationship status: Not on file     Intimate partner violence     Fear of current or ex partner: Not on file     Emotionally abused: Not on file     Physically abused: Not on file     Forced sexual activity: Not on file   Other Topics Concern     Incontinent Not Asked     Toileting independently Not Asked     Cognitive impairment Not Asked     Vision impairment Not Asked     Hearing impairment Not Asked     Dentures Not Asked   Social History Narrative     Not on file          Review of Systems   Constitutional: Positive for activity change. Negative for appetite change, chills, diaphoresis, fatigue and fever.        No concerns   HENT: Negative for congestion and hearing loss.    Eyes: Negative.    Respiratory: Negative  for shortness of breath and wheezing.    Cardiovascular: Negative.    Gastrointestinal: Negative.    Endocrine: Negative.    Genitourinary: Negative for difficulty urinating.   Musculoskeletal:        More R hip pain, recent fall   Skin:        Intact   Allergic/Immunologic: Negative.    Neurological: Negative for dizziness, speech difficulty and light-headedness.   Hematological: Negative.    Psychiatric/Behavioral: Positive for confusion. Negative for agitation and sleep disturbance. The patient is not nervous/anxious and is not hyperactive.         Baseline       Vitals:    01/27/21 1118   BP: 148/78   Pulse: 65   Resp: 16   Temp: 97  F (36.1  C)   SpO2: 98%   Weight: 136 lb (61.7 kg)       Physical Exam   Constitutional: No distress.   Hospice care, denies pain   HENT:   Head: Normocephalic and atraumatic.   Mouth/Throat: Oropharynx is clear and moist. No oropharyngeal exudate.   Eyes: Right eye exhibits no discharge. Left eye exhibits no discharge. No scleral icterus.   Neck: Normal range of motion. Neck supple. No JVD present.   Cardiovascular: Normal rate.   Pulmonary/Chest: Effort normal. No stridor. No respiratory distress.   RA, Dim   Abdominal: He exhibits no distension.   No constipation or diarrhea reported   Genitourinary:    Genitourinary Comments: incontinent     Musculoskeletal:         General: No edema.      Comments: Denies pain, terrence lift   Neurological: He is alert. No cranial nerve deficit.   A/O x1   Skin: Skin is warm and dry. He is not diaphoretic.   intact   Psychiatric: He has a normal mood and affect.   No behaviors reported   Vitals reviewed.      Medication List:  Current Outpatient Medications   Medication Sig     acetaminophen (TYLENOL) 500 MG tablet Take 1,000 mg by mouth 4 (four) times a day.     lidocaine 4 % patch Place 1 patch on the skin daily. Remove and discard patch with 12 hours. Apply to R hip     oxyCODONE (ROXICODONE) 5 MG immediate release tablet Take 1 tablet (5 mg  total) by mouth 3 (three) times a day. And 5mg q4h PRN (Patient taking differently: Take 2.5 mg by mouth 2 (two) times a day. And 2.5mg q4h PRN)     polyethylene glycol (MIRALAX) 17 gram packet Take 17 g by mouth daily as needed (Give 17 gram by mouth as needed daily for constipation. Give by mouth or nasogastric tube).     QUEtiapine (SEROQUEL) 25 MG tablet Take 12.5 mg by mouth 2 (two) times a day. Give at 0800 and 1600       Labs:  Results for orders placed or performed in visit on 09/21/20   Basic Metabolic Panel   Result Value Ref Range    Sodium 136 136 - 145 mmol/L    Potassium 4.1 3.5 - 5.0 mmol/L    Chloride 107 98 - 107 mmol/L    CO2 22 22 - 31 mmol/L    Anion Gap, Calculation 7 5 - 18 mmol/L    Glucose 84 70 - 125 mg/dL    Calcium 9.0 8.5 - 10.5 mg/dL    BUN 24 8 - 28 mg/dL    Creatinine 1.00 0.70 - 1.30 mg/dL    GFR MDRD Af Amer >60 >60 mL/min/1.73m2    GFR MDRD Non Af Amer >60 >60 mL/min/1.73m2     Lab Results   Component Value Date    WBC 16.4 (H) 06/29/2020    HGB 14.2 06/29/2020    HCT 41.4 06/29/2020    MCV 94 06/29/2020     06/29/2020     Lab Results   Component Value Date    TSH 0.87 08/14/2019     Vitamin B-12   Date Value Ref Range Status   08/14/2019 463 213 - 816 pg/mL Final     Vitamin D, Total (25-Hydroxy)   Date Value Ref Range Status   08/14/2019 9.5 (L) 30.0 - 80.0 ng/mL Final     Mag 1.9      Assessment/Plan:    Hospice care: continues with [facility] as of 7/2020    New R nondisplaced subcapital hip fx suffered on 6/27/20: family wanted him kept in facility, so Lorin GEORGE from  has guided tx. Now no pain associated, uses terrence for transfers    Pain control: continue tylenol 1000mg four times a day and lidocaine patch/gel. Per stability will continue oxycodone 2.5mg to two times a day with 2.5mg q4h PRN. Denies pain    Hypertension: previously losartan weaned off, more htn evident. Per monitoring, SBP <150    Dementia: continue seroquel 12.5mg two times a day,  discontinued namenda prior    Hx of leukocytosis: Last white count 16.4 on 6/29/20, possibly due to fracture, UA/UC negative, probably d/t fx. No longer monitoring    Hypokalemia: last K 3.7 on 6/29/20, no longer monitoring    Vit D def: continue D3 5000U daily, discontinued supplement    Normocytic anemia: last Hgb 14.2 on 6/29. No longer monitoring    Insomnia: melatonin discontinued, no issues reported    Constipation: Continue MiraLAX daily as needed     Disposition: Resides in LTC. Total lift with all transfers. WC bound. SLUMS 7/30.     Electronically signed by: Stewart Garcia NP

## 2021-06-16 NOTE — PROGRESS NOTES
[facility] Admission note      Patient: Wilian Vallejo  MRN: [id number]  Date of Service: 4/14/2021      [facility] [[id number]]  Reason for Visit     Chief Complaint   Patient presents with     Review Of Multiple Medical Conditions   Follow-up dementia    Code Status     DNR/DNI    Assessment     Progressive weight loss with failure to thrive now on hospice cares  Advanced dementia   History of hypertension now on a low-dose of losartan 25 mg daily; discontinued due to low blood pressures  Hypokalemia on supplementation  BPH  Debilitation generalized weakness  History of a hip fracture treated conservatively in the TCU  -History of recurrent falls    Plan     Patient is currently long-term care.  He has had progressive decline with dementia and currently on hospice cares.  He is on minimal medications.  At baseline he is alert and oriented to himself.  Physical remains debilitated and requires a higher level of care no longer ambulating using a wheelchair.  He is appropriate to continue on hospice  Weights have been stable though oral intake continues to be erratic.  Care will be supportive at the request of family    History     Patient is a very pleasant 87 y.o. male who is a resident of LTC  Patient unfortunately has advanced dementia due to which she remains a poor historian at best he is alert to only himself.  He has continued to decline.  He has been switched to hospice care due to advanced dementia at present alert and oriented only to himself    He had a fall subsequently was diagnosed with nondisplaced subcapital hip fracture on 6/27/2020.  Family declined hospitalization.   He was in the nursing home and has become progressively nonambulatory and wheelchair-bound.  He continues to require significant assistance with staff.  Pain is controlled using Tylenol  Progress is limited due to profound cognitive impairment and baseline debilitation  Pain management has been  optimized using hospice support.  He has a recent history of significant weight loss now weights appear to be stable around 135 pounds.  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 He has never used smokeless tobacco. He reports current alcohol use.    Family History     Reviewed, and family history includes Colon cancer in his maternal grandmother; Heart disease in his brother and mother; Hypertension in his mother.    Medication List     Current Outpatient Medications on File Prior to Visit   Medication Sig Dispense Refill     acetaminophen (TYLENOL) 500 MG tablet Take 1,000 mg by mouth 4 (four) times a day.       lidocaine 4 % patch Place 1 patch on the skin daily. Remove and discard patch with 12 hours. Apply to R hip       oxyCODONE (ROXICODONE) 5 MG immediate release tablet Take 1 tablet (5 mg total) by mouth 3 (three) times a day. And 5mg q4h PRN (Patient taking differently: Take 2.5 mg by mouth 2 (two) times a day. And 2.5mg q4h PRN) 30 tablet 0     polyethylene glycol (MIRALAX) 17 gram packet Take 17 g by mouth daily as needed (Give 17 gram by mouth as needed daily for constipation. Give by mouth or nasogastric tube).       QUEtiapine (SEROQUEL)  25 MG tablet Take 12.5 mg by mouth 2 (two) times a day. Give at 0800 and 1600       No current facility-administered medications on file prior to visit.    Losartan has been discontinued prior to discharge from the hospital    Allergies     No Known Allergies    Review of Systems   A comprehensive review of 14 systems was done. Pertinent findings noted here and in history of present illness. All the rest negative.  Constitutional: Negative.  Negative for fever, chills, he has activity change, appetite change and fatigue.   HENT: Negative for congestion and facial swelling.    Eyes: Negative for photophobia, redness and visual disturbance.   Respiratory: Negative for cough and chest tightness.    Cardiovascular: Negative for chest pain, palpitations and has NO significant leg swelling.   Gastrointestinal: Negative for nausea, diarrhea, constipation, blood in stool and abdominal distention.   Genitourinary: Negative.    Musculoskeletal: Negative.  Has difficulty walking does use a walker  Skin: Negative.    Neurological: Negative for dizziness, tremors, syncope, weakness, light-headedness and headaches.   Hematological: Does not bruise/bleed easily.   Psychiatric/Behavioral: Negative.  CONFused with limited recall      Physical Exam     Recent Vitals 1/27/2021   Height -   Weight 136 lbs   BSA (m2) 1.75 m2   /78   Pulse 65   Temp 97   SpO2 98   Some recent data might be hidden     GENERAL: no acute distress. Cooperative in conversation.   HEENT: pupils are equal, round and reactive. Oral mucosa is moist and intact.  RESP:Chest symmetric. Regular respiratory rate. No stridor.  ABD: Nondistended, soft.  EXTREMITIES: No lower extremity edema.   NEURO: non focal. Alert and oriented X SELF.   PSYCH: within normal limits. No depression or anxiety.  SKIN: warm dry intact , ecchymosis with skin tear of the right forearm noted         Lab Results     Results for orders placed or performed in visit on 09/21/20   Basic  Metabolic Panel   Result Value Ref Range    Sodium 136 136 - 145 mmol/L    Potassium 4.1 3.5 - 5.0 mmol/L    Chloride 107 98 - 107 mmol/L    CO2 22 22 - 31 mmol/L    Anion Gap, Calculation 7 5 - 18 mmol/L    Glucose 84 70 - 125 mg/dL    Calcium 9.0 8.5 - 10.5 mg/dL    BUN 24 8 - 28 mg/dL    Creatinine 1.00 0.70 - 1.30 mg/dL    GFR MDRD Af Amer >60 >60 mL/min/1.73m2    GFR MDRD Non Af Amer >60 >60 mL/min/1.73m2       Recheck x-ray shows a nondisplaced right subcapital femur fracture with no displacement  SOLOMON Marshall

## 2021-06-18 NOTE — PATIENT INSTRUCTIONS - HE
Patient Instructions by Stewart Garcia NP at 10/21/2020  8:10 AM     Author: Stewart Garcia NP Service: -- Author Type: Nurse Practitioner    Filed: 10/21/2020  2:59 PM Encounter Date: 10/21/2020 Status: Signed    : Stewart Garcia NP (Nurse Practitioner)         Patient Education     Exercise for a Healthier Heart  You may wonder how you can improve the health of your heart. If youre thinking about exercise, youre on the right track. You dont need to become an athlete, but you do need a certain amount of brisk exercise to help strengthen your heart. If you have been diagnosed with a heart condition, your doctor may recommend exercise to help stabilize your condition. To help make exercise a habit, choose safe, fun activities.       Be sure to check with your health care provider before starting an exercise program.    Why exercise?  Exercising regularly offers many healthy rewards. It can help you do all of the following:    Improve your blood cholesterol levels to help prevent further heart trouble    Lower your blood pressure to help prevent a stroke or heart attack    Control diabetes, or reduce your risk of getting this disease    Improve your heart and lung function    Reach and maintain a healthy weight    Make your muscles stronger and more limber so you can stay active    Prevent falls and fractures by slowing the loss of bone mass (osteoporosis)    Manage stress better  Exercise tips  Ease into your routine. Set small goals. Then build on them.  Exercise on most days. Aim for a total of 150 or more minutes of moderate to  vigorous intensity activity each week. Consider 40 minutes, 3 to 4 times a week. For best results, activity should last for 40 minutes on average. It is OK to work up to the 40 minute period over time. Examples of moderate-intensity activity is walking one mile in 15 minutes or 30 to 45 minutes of yard work.  Step up your daily activity level. Along with your exercise  program, try being more active throughout the day. Walk instead of drive. Do more household tasks or yard work.  Choose one or more activities you enjoy. Walking is one of the easiest things you can do. You can also try swimming, riding a bike, or taking an exercise class.  Stop exercising and call your doctor if you:    Have chest pain or feel dizzy or lightheaded    Feel burning, tightness, pressure, or heaviness in your chest, neck, shoulders, back, or arms    Have unusual shortness of breath    Have increased joint or muscle pain    Have palpitations or an irregular heartbeat      2923-9387 [employer]. [address] [place] [address]. All rights reserved. This information is not intended as a substitute for professional medical care. Always follow your healthcare professional's instructions.         Patient Education   Activities of Daily Living  Your Health Risk Assessment indicates you have difficulties with activities of daily living such as eating, getting dressed, grooming, bathing, walking, or using the toilet. Please make a follow up appointment for us to address this issue in more detail.     Patient Education   Instrumental Activities of Daily Living  Your Health Risk Assessment indicates you have difficulties with instrumental activities of daily living which include laundry, housekeeping, banking, shopping, using the telephone, food preparation, transportation, or taking your own medications. Please make a follow up appointment for us to address this issue in more detail.    [employer] has resources available on the following website: https://www.[facility].org/caregivers.html     Also, here is a local agency that provides help with meals and other assistance:   [facility] Line: 303.875.5485     Patient Education   Preventing Falls in the Home  As you get older, falls are more likely. Thats because your reaction time slows. Your muscles and joints may also get stiffer,  making them less flexible. Illness, medications, and vision changes can also affect your balance. A fall could leave you unable to live on your own. To make your home safer, follow these tips:    Floors    Put nonskid pads under area rugs.    Remove throw rugs.    Replace worn floor coverings.    Tack carpets firmly to each step on carpeted stairs. Put nonskid strips on the edges of uncarpeted stairs.    Keep floors and stairs free of clutter and cords.    Arrange furniture so there are clear pathways.    Clean up any spills right away.    Bathrooms    Install grab bars in the tub or shower.    Apply nonskid strips or put a nonskid rubber mat in the tub or shower.    Sit on a bath chair to bathe.    Use bathmats with nonskid backing.    Lighting    Keep a flashlight in each room.    Put a nightlight along the pathway between the bedroom and the bathroom.    9123-7391 [employer]. [address] [place] [address]. All rights reserved. This information is not intended as a substitute for professional medical care. Always follow your healthcare professional's instructions.           Advance Directive  Patients advance directive was discussed and I am comfortable with the patients wishes.  Patient Education   Personalized Prevention Plan  You are due for the preventive services outlined below.  Your care team is available to assist you in scheduling these services.  If you have already completed any of these items, please share that information with your care team to update in your medical record.  Health Maintenance   Topic Date Due   ? TD 18+ HE  08/13/1951   ? ADVANCE CARE PLANNING  08/13/1951   ? ZOSTER VACCINES (1 of 2) 08/13/1983   ? Pneumococcal Vaccine: 65+ Years (1 of 1 - PPSV23) 08/13/1998   ? INFLUENZA VACCINE RULE BASED (1) 08/01/2020   ? MEDICARE ANNUAL WELLNESS VISIT  10/21/2021   ? FALL RISK ASSESSMENT  10/21/2021   ? Pneumococcal Vaccine: Pediatrics (0 to 5 Years) and At-Risk  Patients (6 to 64 Years)  Aged Out   ? HEPATITIS B VACCINES  Aged Out

## 2021-06-19 NOTE — LETTER
Letter by Dinora Marin MBBS at      Author: Dinora Marin MBBS Service: -- Author Type: --    Filed:  Encounter Date: 6/3/2019 Status: (Other)         Patient: Wilian Vallejo   MR Number: [id number]   YOB: 1933   Date of Visit: 6/3/2019     [facility]      Code Status:  FULL CODE  Visit Type: H & P     Facility:  [facility] [[id number]]           History of Present Illness: Wilian Vallejo is a 85 y.o. male with PMH moderate dementia, BPH s/p TURP, essential hypertension and hearing loss admitted to [facility] after recent admission to [facility] for weakness.    At time of interview, patient is accompanied by his son, daughter and son in law. Patient is unable to provide a recount of recent events so history is obtained by family members are chart review.     Patient had been residing with his wife in an independent living situation prior to admission to the hospital. Family notes he has had gradual decline in his memory over the past 5 years or so as well as a physical decline (progressive weakness) over the past year. His wife cared for him, assisted him with ADLs, and family ran occasional errands for them (ie obtaining groceries). They did not have any other outside assistance and patient has never resided in an assisted living or memory care unit before. He has been on Namenda for a few years.     In mid-May, patient's wife became acutely unconscious at their home. Patient was able to call 911. Patient's wife was transported to [facility] and was admitted to the ICU with critical illness. Patient then moved to temporarily stay with his son. Patient's son notes patient required nearly 24 hour supervision and assistance with all ADLs. He demonstrated weakness, especially in getting up from a chair, etc.   His oral intake was poor compared to his baseline.   Patient was at son's home when he suffered a mechanical fall in late May. Per  his son, patient tripped over his feet, rolled onto a car and then fell to the ground. He was evaluated at an Urgent Care and discharged home. He remained generally weak, not getting out of bed.     Several days later, patient was at his son's home with his granddaugther when she was assisting him to stand and had an episode of gasping for air, body shaking and becoming unresponsive. This lasted 2 mins and patient was able to respond. EMS was called and patient was sent to [facility].     On initial evaluation, patient had mild leukocytosis and RUY, normal INR. CXR, EKG and head CT showed no acute pathology. He was admitted for observation, given IVF. Leukocytosis and RUY improved.   His chlorthalidone was held as he presented with signs of dehydration and blood pressures remained within normal limits during his hospital stay. PT/OT recommended SNF.    At the time of my interview, family states patient seems more fatigued but his mental status is otherwise at his baseline. Patient denies dizziness, chest pain, difficulty breathing, abdominal pain, nausea or changes to bowel. Family is present today to discuss patient that his wife passed at [facility] yesterday     Blood pressure review was done and he has been consistently hypotensive since admission blood pressures have been running low.    Past Medical History:   Diagnosis Date   ? BPH (benign prostatic hyperplasia)    ? Dementia    ? Essential hypertension    ? Inguinal hernia without obstruction or gangrene, recurrence not specified, unspecified laterality 04/02/2013    Left   ? Mild cognitive impairment with memory loss    ? S/P TURP    ? Weight loss      Past Surgical History:   Procedure Laterality Date   ? COLONOSCOPY     ? EYE SURGERY     ? Fulgurate bleeders      evacuate clots   ? Laproscopic herniorrhaphy inguinal Bilateral    ? TRANSURETHRAL RESECTION OF BLADDER      Cytoscopy TURP combined     Family History   Problem Relation Age of Onset   ? Heart disease  Mother    ? Hypertension Mother    ? Heart disease Brother         Respiratory   ? Colon cancer Maternal Grandmother         colorectal     Social History     Socioeconomic History   ? Marital status: Patient Declined     Spouse name: Not on file   ? Number of children: Not on file   ? Years of education: Not on file   ? Highest education level: Not on file   Occupational History   ? Not on file   Social Needs   ? Financial resource strain: Not on file   ? Food insecurity:     Worry: Not on file     Inability: Not on file   ? Transportation needs:     Medical: Not on file     Non-medical: Not on file   Tobacco Use   ? Smoking status: Never Smoker   ? Smokeless tobacco: Never Used   Substance and Sexual Activity   ? Alcohol use: Yes     Comment: 1-2 per week   ? Drug use: Not on file   ? Sexual activity: Not on file   Lifestyle   ? Physical activity:     Days per week: Not on file     Minutes per session: Not on file   ? Stress: Not on file   Relationships   ? Social connections:     Talks on phone: Not on file     Gets together: Not on file     Attends Zoroastrianism service: Not on file     Active member of club or organization: Not on file     Attends meetings of clubs or organizations: Not on file     Relationship status: Not on file   ? Intimate partner violence:     Fear of current or ex partner: Not on file     Emotionally abused: Not on file     Physically abused: Not on file     Forced sexual activity: Not on file   Other Topics Concern   ? Incontinent Not Asked   ? Toileting independently Not Asked   ? Cognitive impairment Not Asked   ? Vision impairment Not Asked   ? Hearing impairment Not Asked   ? Dentures Not Asked   Social History Narrative   ? Not on file     Current Outpatient Medications   Medication Sig Dispense Refill   ? acetaminophen (TYLENOL) 325 MG tablet Take 650 mg by mouth every 6 (six) hours as needed for pain (Give 2 tablets by mouth every 6 hour as needed for pain).     ? losartan (COZAAR) 50  "MG tablet Take 50 mg by mouth daily. Give 1 tablet by mouth one time a day for HTN     ? memantine (NAMENDA) 5 MG tablet Take 5 mg by mouth 2 (two) times a day.     ? polyethylene glycol (MIRALAX) 17 gram packet Take 17 g by mouth daily as needed (Give 17 gram by mouth as needed daily for constipation. Give by mouth or nasogastric tube).       No current facility-administered medications for this visit.      No Known Allergies      Review of Systems:    Constitutional: Positive for fatigue  Negative for fever, chills  HENT: Negative for congestion   Eyes: Negative for  visual disturbance.   Respiratory: Negative for cough and chest tightness.    Cardiovascular: Negative for chest pain  and leg swelling.   Gastrointestinal: Negative for nausea, diarrhea, constipation  Genitourinary: Negative.    Musculoskeletal: Negative.    Skin: Positive for abrasion on right forearm  Neurological: Positive for generalized weakness. Negative for dizziness light-headedness and headaches.   Psychiatric/Behavioral: Positive for chronic memory loss      Physical Exam:    /56 ; T 98 ; P72   GENERAL: Sitting in wheelchair, no acute distress. Occasionally falling asleep but awakens to voice   HEENT: pupils are equal, round and EOMI. Oral mucosa is moist.  RESP: Chest symmetric. Regular respiratory rate. No stridor, crackles or consolidations.  CVS: RRR without murmur  ABD: Nondistended, soft. Normoactive bowel sounds  EXTREMITIES: No lower extremity edema.   NEURO: non focal. Oriented to self only. Notes that \"family members\" are present but doesn't know them by name and calls his son in law a \"close friend\"  PSYCH: smiling, good eye contact, no obvious hallucinations  SKIN: warm dry intact     Labs:    Recent Results (from the past 240 hour(s))   Basic Metabolic Panel   Result Value Ref Range    Sodium 137 136 - 145 mmol/L    Potassium 3.4 (L) 3.5 - 5.0 mmol/L    Chloride 103 98 - 107 mmol/L    CO2 24 22 - 31 mmol/L    Anion Gap, " Calculation 10 5 - 18 mmol/L    Glucose 95 70 - 125 mg/dL    Calcium 9.2 8.5 - 10.5 mg/dL    BUN 32 (H) 8 - 28 mg/dL    Creatinine 1.00 0.70 - 1.30 mg/dL    GFR MDRD Af Amer >60 >60 mL/min/1.73m2    GFR MDRD Non Af Amer >60 >60 mL/min/1.73m2   HM1 (CBC with Diff)   Result Value Ref Range    WBC 12.5 (H) 4.0 - 11.0 thou/uL    RBC 4.53 4.40 - 6.20 mill/uL    Hemoglobin 14.5 14.0 - 18.0 g/dL    Hematocrit 42.9 40.0 - 54.0 %    MCV 95 80 - 100 fL    MCH 32.0 27.0 - 34.0 pg    MCHC 33.8 32.0 - 36.0 g/dL    RDW 12.1 11.0 - 14.5 %    Platelets 390 140 - 440 thou/uL    MPV 9.0 8.5 - 12.5 fL    Neutrophils % 69 50 - 70 %    Lymphocytes % 19 (L) 20 - 40 %    Monocytes % 10 2 - 10 %    Eosinophils % 2 0 - 6 %    Basophils % 1 0 - 2 %    Neutrophils Absolute 8.5 (H) 2.0 - 7.7 thou/uL    Lymphocytes Absolute 2.4 0.8 - 4.4 thou/uL    Monocytes Absolute 1.3 (H) 0.0 - 0.9 thou/uL    Eosinophils Absolute 0.2 0.0 - 0.4 thou/uL    Basophils Absolute 0.1 0.0 - 0.2 thou/uL     No results found.        Assessment/Plan: Wilian Vallejo is an 85 year old male with PMH moderate dementia, BPH s/p TURP, essential hypertension and hearing loss admitted to [facility] after recent admission to [facility] for weakness, likely secondary to dehydration and generalized deconditioning.    Weakness: A progressive issue but acutely worsened in the setting of wife's illness. Likely some component of dehydration as he was admitted with RUY which improved with IVF. He also appears to have generalized deconditioning. PT/OT evaluation recommended SNF both for continued therapy and for placement recommendations.  - Continue PT/OT  - Recheck BMP, CBC     Moderate Dementia: On Namenda at baseline. Per family, patient's memory, especially short term memory, has been declining over the past 5 years. He has been cared for by his wife with intermittent help from his family. He has had some episodes of wandering. Patient does require  significant cares given the degree of his dementia. Now that his wife is , family is considering placement options for patient.   - Continue Namenda  - SW to assist with placement planning    Hypertension: on Losartan and Chlorthalidone at baseline however Chlorthalidone was held due to dehydration and well controlled BPs while hospitalized.   - Continue Losartan with hold parameters.  If blood pressures remain consistently low we will discontinue antihypertensives  - Continue to hold Chlorthalidone and consider restarting if BP remains above goal    Hypokalemia: K was 3.3 during hospitalization and improved with supplementation. K was 3.6 on discharge. Thought to be nutritional vs medication side effect.   - Recheck BMP    Constipation  - Continue Miralax PRN constipation    Total time spent was 45 minutes, more than half of it was in face-to-face counseling regarding disease state, treatment, side effects, documentation, review of clinical data and coordination of care    The patient was discussed with Dr. Marin who agrees with the plan      Electronically signed by: Marie Hammer MD  Patient was examined independently and then again seen in the presence of the resident.  Family including his son daughter and son-in-law were present also during the exam.  They supplemented his history as patient is quite confused.  Total time spent is 45 minutes more than 30 minutes spent face-to-face talking to the patient in the presence of family reviewing his history and concerns.  Family is now looking at long-term assisted living memory care positive placement for him in light of his advanced dementia.  Concerns as outlined in the note above  SOLOMON Marshall    This progress note was completed using Dragon software and there may be grammatical errors.

## 2021-06-19 NOTE — LETTER
Letter by Dinora Marin MBBS at      Author: Dinora Marin MBBS Service: -- Author Type: --    Filed:  Encounter Date: 2019 Status: (Other)         Patient: Wilian Vallejo   MR Number: [id number]   YOB: 1933   Date of Visit: 2019     [facility]      Code Status:  FULL CODE  Visit Type: Review Of Multiple Medical Conditions     Facility:  [facility] [[id number]]           History of Present Illness: Wilian Vallejo is a 85 y.o. male who is currently admitted to the TCU.  He has profound dementia due to which placement issues are being looked into.  He was being cared for his wife who recently .  Recheck slums is .  His mood and behaviors have been stable and he remains pleasantly confused  Blood pressures are reviewed.  He has been taken off losartan and blood pressures have been stable.  However for the last few days his blood pressures have rebounded systolics are in the 160s however this diastolics have been consistently greater than 90 as high as 100.  He has been having some poor oral intake.  The been pushing some fluids in him his heart rates have been elevated to and they are wondering if he is getting dehydrated unfortunately  of his dementia he is not a great historian he could not tell me if he had significantly had anything to eat.  He is awaiting placement in long-term care.  Recheck BMP done has been stable with no electrolyte imbalance noted so far        Past Medical History:   Diagnosis Date   ? BPH (benign prostatic hyperplasia)    ? Dementia    ? Essential hypertension    ? Inguinal hernia without obstruction or gangrene, recurrence not specified, unspecified laterality 2013    Left   ? Mild cognitive impairment with memory loss    ? S/P TURP    ? Weight loss      Past Surgical History:   Procedure Laterality Date   ? COLONOSCOPY     ? EYE SURGERY     ? Fulgurate bleeders      evacuate clots   ? Laproscopic  herniorrhaphy inguinal Bilateral    ? TRANSURETHRAL RESECTION OF BLADDER      Cytoscopy TURP combined     Family History   Problem Relation Age of Onset   ? Heart disease Mother    ? Hypertension Mother    ? Heart disease Brother         Respiratory   ? Colon cancer Maternal Grandmother         colorectal     Social History     Socioeconomic History   ? Marital status: Patient Declined     Spouse name: Not on file   ? Number of children: Not on file   ? Years of education: Not on file   ? Highest education level: Not on file   Occupational History   ? Not on file   Social Needs   ? Financial resource strain: Not on file   ? Food insecurity:     Worry: Not on file     Inability: Not on file   ? Transportation needs:     Medical: Not on file     Non-medical: Not on file   Tobacco Use   ? Smoking status: Never Smoker   ? Smokeless tobacco: Never Used   Substance and Sexual Activity   ? Alcohol use: Yes     Comment: 1-2 per week   ? Drug use: Not on file   ? Sexual activity: Not on file   Lifestyle   ? Physical activity:     Days per week: Not on file     Minutes per session: Not on file   ? Stress: Not on file   Relationships   ? Social connections:     Talks on phone: Not on file     Gets together: Not on file     Attends Baptism service: Not on file     Active member of club or organization: Not on file     Attends meetings of clubs or organizations: Not on file     Relationship status: Not on file   ? Intimate partner violence:     Fear of current or ex partner: Not on file     Emotionally abused: Not on file     Physically abused: Not on file     Forced sexual activity: Not on file   Other Topics Concern   ? Incontinent Not Asked   ? Toileting independently Not Asked   ? Cognitive impairment Not Asked   ? Vision impairment Not Asked   ? Hearing impairment Not Asked   ? Dentures Not Asked   Social History Narrative   ? Not on file     Current Outpatient Medications   Medication Sig Dispense Refill   ?  acetaminophen (TYLENOL) 325 MG tablet Take 650 mg by mouth every 6 (six) hours as needed for pain (Give 2 tablets by mouth every 6 hour as needed for pain).     ? memantine (NAMENDA) 5 MG tablet Take 5 mg by mouth 2 (two) times a day.     ? polyethylene glycol (MIRALAX) 17 gram packet Take 17 g by mouth daily as needed (Give 17 gram by mouth as needed daily for constipation. Give by mouth or nasogastric tube).       No current facility-administered medications for this visit.      No Known Allergies      Review of Systems:    Constitutional: Positive for fatigue  Negative for fever, chills  HENT: Negative for congestion   Eyes: Negative for  visual disturbance.   Respiratory: Negative for cough and chest tightness.    Cardiovascular: Negative for chest pain  and leg swelling.   Gastrointestinal: Negative for nausea, diarrhea, constipation  Genitourinary: Negative.    Musculoskeletal: Negative.    Skin: Positive for abrasion on right forearm  Neurological: Positive for generalized weakness. Negative for dizziness light-headedness and headaches.   Psychiatric/Behavioral: Positive for chronic memory loss      Physical Exam:    /105 ; T 98 ; P 98  Wt 160lb  GENERAL: Sitting in wheelchair, no acute distress. Occasionally falling asleep but awakens to voice   HEENT: pupils are equal, round and EOMI. Oral mucosa is moist.  RESP: Chest symmetric. Regular respiratory rate. No stridor, crackles or consolidations.  CVS: RRR without murmur  ABD: Nondistended, soft. Normoactive bowel sounds  EXTREMITIES: No lower extremity edema.   NEURO: non focal. Oriented to self only  PSYCH: smiling, good eye contact, no obvious hallucinations  SKIN: warm dry intact     Labs:    Recent Results (from the past 240 hour(s))   Basic Metabolic Panel   Result Value Ref Range    Sodium 139 136 - 145 mmol/L    Potassium 3.9 3.5 - 5.0 mmol/L    Chloride 106 98 - 107 mmol/L    CO2 26 22 - 31 mmol/L    Anion Gap, Calculation 7 5 - 18 mmol/L     Glucose 81 70 - 125 mg/dL    Calcium 9.1 8.5 - 10.5 mg/dL    BUN 14 8 - 28 mg/dL    Creatinine 0.91 0.70 - 1.30 mg/dL    GFR MDRD Af Amer >60 >60 mL/min/1.73m2    GFR MDRD Non Af Amer >60 >60 mL/min/1.73m2             Assessment/Plan:   #1 advanced dementia.    #2 hypertension with elevated diastolic blood pressures noted in the TCU now  #3  debilitation with gait instability  #4 hypokalemia ON supplementation  #5 WEAKNESS  #6 suspected dehydration mild    Patient is awaiting placement in a memory care unit.  Cognitive scores remain quite impaired and he is pleasantly confused with no behaviors.  His current slums is 7/30.  He is ambulating about 25 feet currently.  Gait is extremely unsteady and he uses a four-wheel walker  Losartan was recently discontinued due to low blood pressures.   will restart it at 50 mg with hold parameters given based on his blood pressure numbers.  His recheck BMP was stable but I suspect in light of his declining oral intake that he may be getting dehydrated staff advised to push fluids   consider getting a BMP again in a few days just to assess electrolytes in light of his poor oral intake    SOLOMON Marshall    This progress note was completed using Dragon software and there may be grammatical errors.

## 2021-06-19 NOTE — LETTER
Letter by Stewart Garcia NP at      Author: Stewart Garcia NP Service: -- Author Type: --    Filed:  Encounter Date: 7/2/2019 Status: (Other)         Patient: Wilian Vallejo   MR Number: [id number]   YOB: 1933   Date of Visit: 7/2/2019     [facility]      Facility:    [facility] [[id number]]  Code Status: POLST AVAILABLE      Chief Complaint/Reason for Visit:    Chief Complaint   Patient presents with   ? Review Of Multiple Medical Conditions       HPI:   Wilian is a 85 y.o. male who is a recent transfer from [facility] with an admission on 5/28/19 and discharge on 5/30/19. He has a PMH of dementia, htn, TURP, who has a wife that has been recently hospitalized at [facility] and his health deteriorated while she was away. He was admitted for weakness and lethargy. He received IVF and thiazide was held. He may need LTC if wife unable to care for him. He was subsequently transferred to the TCU for rehab and possible change of disposition.      Past Medical History:  Past Medical History:   Diagnosis Date   ? BPH (benign prostatic hyperplasia)    ? Dementia    ? Essential hypertension    ? Inguinal hernia without obstruction or gangrene, recurrence not specified, unspecified laterality 04/02/2013    Left   ? Mild cognitive impairment with memory loss    ? S/P TURP    ? Weight loss            Surgical History:  Past Surgical History:   Procedure Laterality Date   ? COLONOSCOPY     ? EYE SURGERY     ? Fulgurate bleeders      evacuate clots   ? Laproscopic herniorrhaphy inguinal Bilateral    ? TRANSURETHRAL RESECTION OF BLADDER      Cytoscopy TURP combined       Family History:   Family History   Problem Relation Age of Onset   ? Heart disease Mother    ? Hypertension Mother    ? Heart disease Brother         Respiratory   ? Colon cancer Maternal Grandmother         colorectal       Social History:    Social History     Socioeconomic History   ?  Marital status: Patient Declined     Spouse name: Not on file   ? Number of children: Not on file   ? Years of education: Not on file   ? Highest education level: Not on file   Occupational History   ? Not on file   Social Needs   ? Financial resource strain: Not on file   ? Food insecurity:     Worry: Not on file     Inability: Not on file   ? Transportation needs:     Medical: Not on file     Non-medical: Not on file   Tobacco Use   ? Smoking status: Never Smoker   ? Smokeless tobacco: Never Used   Substance and Sexual Activity   ? Alcohol use: Yes     Comment: 1-2 per week   ? Drug use: Not on file   ? Sexual activity: Not on file   Lifestyle   ? Physical activity:     Days per week: Not on file     Minutes per session: Not on file   ? Stress: Not on file   Relationships   ? Social connections:     Talks on phone: Not on file     Gets together: Not on file     Attends Restorationism service: Not on file     Active member of club or organization: Not on file     Attends meetings of clubs or organizations: Not on file     Relationship status: Not on file   ? Intimate partner violence:     Fear of current or ex partner: Not on file     Emotionally abused: Not on file     Physically abused: Not on file     Forced sexual activity: Not on file   Other Topics Concern   ? Incontinent Not Asked   ? Toileting independently Not Asked   ? Cognitive impairment Not Asked   ? Vision impairment Not Asked   ? Hearing impairment Not Asked   ? Dentures Not Asked   Social History Narrative   ? Not on file          Review of Systems   Constitutional: Negative for activity change, appetite change, chills, diaphoresis, fatigue and fever.        No issues   HENT: Negative for congestion and hearing loss.    Eyes: Negative.    Respiratory: Negative for shortness of breath and wheezing.    Cardiovascular: Negative.    Gastrointestinal: Negative.    Endocrine: Negative.    Genitourinary: Negative for difficulty urinating.   Musculoskeletal:  Negative.    Skin:        Intact   Allergic/Immunologic: Negative.    Neurological: Negative for dizziness, speech difficulty and light-headedness.   Hematological: Negative.    Psychiatric/Behavioral: Positive for confusion. Negative for agitation and sleep disturbance. The patient is not nervous/anxious and is not hyperactive.        Vitals:    07/02/19 1947   BP: (!) 167/94   Pulse: 75   Resp: 18   Temp: 97  F (36.1  C)   SpO2: 94%   Weight: 160 lb (72.6 kg)       Physical Exam   Constitutional: No distress.   No acute issues   HENT:   Head: Normocephalic and atraumatic.   Mouth/Throat: Oropharynx is clear and moist. No oropharyngeal exudate.   Eyes: Right eye exhibits no discharge. Left eye exhibits no discharge. No scleral icterus.   Neck: Normal range of motion. Neck supple. No JVD present.   Cardiovascular: Normal rate. Exam reveals no gallop and no friction rub.   No murmur heard.  Pulmonary/Chest: Effort normal. No stridor. No respiratory distress. He has no wheezes. He has no rales.   CTA, RA   Abdominal: Soft. Bowel sounds are normal. He exhibits no distension. There is no tenderness. There is no rebound.   Denies constipation or diarrhea   Genitourinary:   Genitourinary Comments: deferred   Musculoskeletal: He exhibits no edema, tenderness or deformity.   Denies pain, uses wc mainly   Neurological: He is alert. No cranial nerve deficit.   A/O x1-2   Skin: Skin is warm and dry. He is not diaphoretic.   Bruised R arm, improved   Psychiatric: He has a normal mood and affect.   Denies anxiety or depression   Vitals reviewed.      Medication List:  Current Outpatient Medications   Medication Sig   ? acetaminophen (TYLENOL) 325 MG tablet Take 650 mg by mouth every 6 (six) hours as needed for pain (Give 2 tablets by mouth every 6 hour as needed for pain).   ? memantine (NAMENDA) 5 MG tablet Take 5 mg by mouth 2 (two) times a day.   ? polyethylene glycol (MIRALAX) 17 gram packet Take 17 g by mouth daily as  needed (Give 17 gram by mouth as needed daily for constipation. Give by mouth or nasogastric tube).       Labs:  Results for orders placed or performed in visit on 19   Basic Metabolic Panel   Result Value Ref Range    Sodium 140 136 - 145 mmol/L    Potassium 4.4 3.5 - 5.0 mmol/L    Chloride 106 98 - 107 mmol/L    CO2 27 22 - 31 mmol/L    Anion Gap, Calculation 7 5 - 18 mmol/L    Glucose 72 70 - 125 mg/dL    Calcium 9.3 8.5 - 10.5 mg/dL    BUN 13 8 - 28 mg/dL    Creatinine 0.91 0.70 - 1.30 mg/dL    GFR MDRD Af Amer >60 >60 mL/min/1.73m2    GFR MDRD Non Af Amer >60 >60 mL/min/1.73m2     Mag 1.9  WBC 12.2  Hgb 13.6  MCV 93  TSH 0.60    Assessment/Plan:    Weakness and lethargy: resolved    Pain control: Continue Tylenol 650 every 6 hours as needed    Hypertension: dc losartan, SBP <160    Dementia: Continue Namenda 5 mg twice daily    Hypokalemia: last K 4.4 on 19, continue potassium 10mEq daily, recheck BMP pending today    Constipation: Continue MiraLAX daily as needed     Disposition: Wife has , now family seeking placement at [facility] .       Electronically signed by: Stewart Garcia NP

## 2021-06-19 NOTE — LETTER
Letter by Dinora Marin MBBS at      Author: Dinora Marin MBBS Service: -- Author Type: --    Filed:  Encounter Date: 9/18/2019 Status: (Other)         Patient: Wilian Vallejo   MR Number: [id number]   YOB: 1933   Date of Visit: 9/18/2019       [facility] Admission note      Patient: Wilian Vallejo  MRN: [id number]  Date of Service: 9/18/2019      [facility] [[id number]]  Reason for Visit     Chief Complaint   Patient presents with   ? Review Of Multiple Medical Conditions       Code Status     FULL CODE    Assessment     Dementia currently on Namenda [facility] 7/30  History of hypertension previously he was taken off losartan but now with rebounding blood pressures he is on a low-dose of losartan 50 mg with hold parameters given for low blood pressure  Significant lymphedema bilateral lower extremities  Insomnia reported by staff ongoing for the last few days  History of hypokalemia on potassium supplementation.  Last BMP on 7/11/2019 with a potassium of 3.9  Debilitation generalized weakness    Plan     Patient currently doing well and long-term congested well.  He has dementia and remains pleasantly confused but overall has been stable.  Continue to monitor mood and behaviors due to recent episode of elopement and fall.  Staff was requested to put some interventions in place including frequent toileting.  Due to elevated blood pressures losartan 25 mg daily has been started and that has been effective.  He is on potassium supplementation daily.  He is on vitamin D supplementation also.  Continue to monitor mood and behaviors      History     Patient is a very pleasant 86 y.o. male who is a resident of LT  He has underlying history of dementia and remains pleasantly confused his last slums was 7/30.  He is alert and oriented to himself and has no recall of recent events.  Overall mood has been stable .  He has some behaviors per staff.  In the last 1 month he has had a  couple of elopement episodes when he has been found in the elevator.  He had a fall on 9/15/2019 when he was found in the bathroom floor.  He has no recall of those events  He has a history of hypertension and is now on a low-dose of losartan at 25 mg.  He is tolerating the medication well.  Labs did show a low vitamin D level so he has been started on supplementation.  He also has a history of hypokalemia and is on potassium supplementation recheck potassiums have been stable to    Past Medical History     Active Ambulatory (Non-Hospital) Problems    Diagnosis   ? Weakness   ? Alzheimer's dementia without behavioral disturbance   ? Essential hypertension   ? Slow transit constipation   ? Hypokalemia     Past Medical History:   Diagnosis Date   ? BPH (benign prostatic hyperplasia)    ? Dementia    ? Essential hypertension    ? Inguinal hernia without obstruction or gangrene, recurrence not specified, unspecified laterality 04/02/2013   ? Mild cognitive impairment with memory loss    ? S/P TURP    ? Weight loss        Past Social History     Reviewed, and he  reports that he has never smoked. He has never used smokeless tobacco. He reports that he drinks alcohol.    Family History     Reviewed, and family history includes Colon cancer in his maternal grandmother; Heart disease in his brother and mother; Hypertension in his mother.    Medication List     Current Outpatient Medications on File Prior to Visit   Medication Sig Dispense Refill   ? acetaminophen (TYLENOL) 325 MG tablet Take 650 mg by mouth every 6 (six) hours as needed for pain (Give 2 tablets by mouth every 6 hour as needed for pain).     ? losartan (COZAAR) 50 MG tablet Take 25 mg by mouth daily. Hold for SBP <130 or DBP <85            ? memantine (NAMENDA) 5 MG tablet Take 5 mg by mouth 2 (two) times a day.     ? polyethylene glycol (MIRALAX) 17 gram packet Take 17 g by mouth daily as needed (Give 17 gram by mouth as needed daily for constipation. Give  by mouth or nasogastric tube).       No current facility-administered medications on file prior to visit.        Allergies     No Known Allergies    Review of Systems   A comprehensive review of 14 systems was done. Pertinent findings noted here and in history of present illness. All the rest negative.  Constitutional: Negative.  Negative for fever, chills, he has activity change, appetite change and fatigue.   HENT: Negative for congestion and facial swelling.    Eyes: Negative for photophobia, redness and visual disturbance.   Respiratory: Negative for cough and chest tightness.    Cardiovascular: Negative for chest pain, palpitations and has significant leg swelling.   Gastrointestinal: Negative for nausea, diarrhea, constipation, blood in stool and abdominal distention.   Genitourinary: Negative.    Musculoskeletal: Negative.  Has difficulty walking does use a walker  Skin: Negative.    Neurological: Negative for dizziness, tremors, syncope, weakness, light-headedness and headaches.   Hematological: Does not bruise/bleed easily.   Psychiatric/Behavioral: Negative.  Fused having significant lymphedema      Physical Exam     Recent Vitals 8/22/2019   Weight 153 lbs   /90   Pulse 58   Temp 97   SpO2 96   Some recent data might be hidden       Constitutional: Oriented to person, and appears well-developed.   HEENT:  Normocephalic and atraumatic.  Eyes: Conjunctivae and EOM are normal. Pupils are equal, round, and reactive to light. No discharge.  No scleral icterus. Nose normal. Mouth/Throat: Oropharynx is clear and moist. No oropharyngeal exudate.    NECK: Normal range of motion. Neck supple. No JVD present. No tracheal deviation present. No thyromegaly present.   CARDIOVASCULAR: Normal rate, regular rhythm and intact distal pulses.  Exam reveals no gallop and no friction rub.  Systolic murmur present.  PULMONARY: Effort normal and breath sounds normal. No respiratory distress.No Wheezing or rales.  ABDOMEN:  Soft. Bowel sounds are normal. No distension and no mass.  There is no tenderness. There is no rebound and no guarding. No HSM.  MUSCULOSKELETAL: Normal range of motion. TRACE leg edema and no tenderness. Mild kyphosis, no tenderness.  LYMPH NODES: Has no cervical, supraclavicular, axillary and groin adenopathy.   NEUROLOGICAL: Alert and oriented to person, . No cranial nerve deficit.  Normal muscle tone. Coordination normal.   GENITOURINARY: Deferred exam.  SKIN: Skin is warm and dry. No rash noted. No erythema. No pallor.   EXTREMITIES: No cyanosis, no clubbing, TRACE leg edema. No Deformity.  PSYCHIATRIC: Normal mood, affect and behavior.  Recall is impaired      Lab Results       Lab Results   Component Value Date    BUN 16 08/14/2019    CALCIUM 9.4 08/14/2019     08/14/2019    CO2 27 08/14/2019    CREATININE 0.87 08/14/2019    GFRAA >60 08/14/2019    GFRNONAA >60 08/14/2019    GLU 76 08/14/2019    HCT 42.1 08/14/2019    WBC 9.6 08/14/2019    HGB 13.9 (L) 08/14/2019    MG 1.9 06/11/2019     08/14/2019    K 3.8 08/14/2019     08/14/2019     Last vitamin D level was 9.5        SOLOMON Marshall

## 2021-06-19 NOTE — LETTER
Letter by Stewart Garcia NP at      Author: Stewart Garcia NP Service: -- Author Type: --    Filed:  Encounter Date: 8/22/2019 Status: (Other)         Patient: Wilian Vallejo   MR Number: [id number]   YOB: 1933   Date of Visit: 8/22/2019     [facility]      Facility:    [facility] [[id number]]  Code Status: POLST AVAILABLE      Chief Complaint/Reason for Visit:    Chief Complaint   Patient presents with   ? Review Of Multiple Medical Conditions       HPI:   Wilian is a 86 y.o. male who initially was a transfer from [facility] with an admission on 5/28/19 and discharge on 5/30/19. He has a PMH of dementia, htn, TURP, who has a wife that has been recently hospitalized at [facility] and his health deteriorated while she was away. He was admitted for weakness and lethargy. He received IVF and thiazide was held. He may need LTC if wife unable to care for him. He was subsequently transferred to the TCU for rehab and possible change of disposition.    LTC:   He finished his TCU stay and now resides in LTC as of 6/2019. He is wc bound with no behaviors reported. He has limited concerns. Patient denies pain, headache, chest pain, numbness or tingling, shortest of breath, eating or swallowing concerns, nausea or vomiting, diarrhea or bowel abnormalities, or no new integumentary concerns today.      Past Medical History:  Past Medical History:   Diagnosis Date   ? BPH (benign prostatic hyperplasia)    ? Dementia    ? Essential hypertension    ? Inguinal hernia without obstruction or gangrene, recurrence not specified, unspecified laterality 04/02/2013    Left   ? Mild cognitive impairment with memory loss    ? S/P TURP    ? Weight loss            Surgical History:  Past Surgical History:   Procedure Laterality Date   ? COLONOSCOPY     ? EYE SURGERY     ? Fulgurate bleeders      evacuate clots   ? Laproscopic herniorrhaphy inguinal Bilateral    ?  TRANSURETHRAL RESECTION OF BLADDER      Cytoscopy TURP combined       Family History:   Family History   Problem Relation Age of Onset   ? Heart disease Mother    ? Hypertension Mother    ? Heart disease Brother         Respiratory   ? Colon cancer Maternal Grandmother         colorectal       Social History:    Social History     Socioeconomic History   ? Marital status: Patient Declined     Spouse name: Not on file   ? Number of children: Not on file   ? Years of education: Not on file   ? Highest education level: Not on file   Occupational History   ? Not on file   Social Needs   ? Financial resource strain: Not on file   ? Food insecurity:     Worry: Not on file     Inability: Not on file   ? Transportation needs:     Medical: Not on file     Non-medical: Not on file   Tobacco Use   ? Smoking status: Never Smoker   ? Smokeless tobacco: Never Used   Substance and Sexual Activity   ? Alcohol use: Yes     Comment: 1-2 per week   ? Drug use: Not on file   ? Sexual activity: Not on file   Lifestyle   ? Physical activity:     Days per week: Not on file     Minutes per session: Not on file   ? Stress: Not on file   Relationships   ? Social connections:     Talks on phone: Not on file     Gets together: Not on file     Attends Sabianism service: Not on file     Active member of club or organization: Not on file     Attends meetings of clubs or organizations: Not on file     Relationship status: Not on file   ? Intimate partner violence:     Fear of current or ex partner: Not on file     Emotionally abused: Not on file     Physically abused: Not on file     Forced sexual activity: Not on file   Other Topics Concern   ? Incontinent Not Asked   ? Toileting independently Not Asked   ? Cognitive impairment Not Asked   ? Vision impairment Not Asked   ? Hearing impairment Not Asked   ? Dentures Not Asked   Social History Narrative   ? Not on file          Review of Systems   Constitutional: Negative for activity change,  appetite change, chills, diaphoresis, fatigue and fever.        No concerns   HENT: Negative for congestion and hearing loss.    Eyes: Negative.    Respiratory: Negative for shortness of breath and wheezing.    Cardiovascular: Negative.    Gastrointestinal: Negative.    Endocrine: Negative.    Genitourinary: Negative for difficulty urinating.   Musculoskeletal: Negative.    Skin:        Intact   Allergic/Immunologic: Negative.    Neurological: Negative for dizziness, speech difficulty and light-headedness.   Hematological: Negative.    Psychiatric/Behavioral: Positive for confusion. Negative for agitation and sleep disturbance. The patient is not nervous/anxious and is not hyperactive.         Baseline       Vitals:    08/22/19 1145   BP: 161/90   Pulse: (!) 58   Resp: 18   Temp: 97  F (36.1  C)   SpO2: 96%   Weight: 153 lb (69.4 kg)       Physical Exam   Constitutional: No distress.   More htn noticed, losartan restarted previously   HENT:   Head: Normocephalic and atraumatic.   Mouth/Throat: Oropharynx is clear and moist. No oropharyngeal exudate.   Eyes: Right eye exhibits no discharge. Left eye exhibits no discharge. No scleral icterus.   Neck: Normal range of motion. Neck supple. No JVD present.   Cardiovascular: Normal rate. Exam reveals no gallop and no friction rub.   No murmur heard.  Pulmonary/Chest: Effort normal. No stridor. No respiratory distress. He has no wheezes. He has no rales.   CTA, RA   Abdominal: Soft. Bowel sounds are normal. He exhibits no distension. There is no tenderness. There is no rebound.   Denies constipation or diarrhea   Genitourinary:   Genitourinary Comments: deferred   Musculoskeletal: He exhibits no edema, tenderness or deformity.   Denies pain, wc bound   Neurological: He is alert. No cranial nerve deficit.   A/O x1   Skin: Skin is warm and dry. He is not diaphoretic.   intact   Psychiatric: He has a normal mood and affect.   Denies anxiety or depression   Vitals  reviewed.      Medication List:  Current Outpatient Medications   Medication Sig   ? acetaminophen (TYLENOL) 325 MG tablet Take 650 mg by mouth every 6 (six) hours as needed for pain (Give 2 tablets by mouth every 6 hour as needed for pain).   ? losartan (COZAAR) 50 MG tablet Take 50 mg by mouth daily. Hold for SBP <130 or DBP <85   ? memantine (NAMENDA) 5 MG tablet Take 5 mg by mouth 2 (two) times a day.   ? polyethylene glycol (MIRALAX) 17 gram packet Take 17 g by mouth daily as needed (Give 17 gram by mouth as needed daily for constipation. Give by mouth or nasogastric tube).       Labs:  Results for orders placed or performed in visit on 08/14/19   Basic Metabolic Panel   Result Value Ref Range    Sodium 139 136 - 145 mmol/L    Potassium 3.8 3.5 - 5.0 mmol/L    Chloride 105 98 - 107 mmol/L    CO2 27 22 - 31 mmol/L    Anion Gap, Calculation 7 5 - 18 mmol/L    Glucose 76 70 - 125 mg/dL    Calcium 9.4 8.5 - 10.5 mg/dL    BUN 16 8 - 28 mg/dL    Creatinine 0.87 0.70 - 1.30 mg/dL    GFR MDRD Af Amer >60 >60 mL/min/1.73m2    GFR MDRD Non Af Amer >60 >60 mL/min/1.73m2     Lab Results   Component Value Date    WBC 9.6 08/14/2019    HGB 13.9 (L) 08/14/2019    HCT 42.1 08/14/2019    MCV 96 08/14/2019     08/14/2019     Lab Results   Component Value Date    TSH 0.87 08/14/2019     Vitamin B-12   Date Value Ref Range Status   08/14/2019 463 213 - 816 pg/mL Final     Vitamin D, Total (25-Hydroxy)   Date Value Ref Range Status   08/14/2019 9.5 (L) 30.0 - 80.0 ng/mL Final     Mag 1.9      Assessment/Plan:    Weakness and lethargy: resolved    Pain control: Continue Tylenol 650 every 6 hours as needed, denies pain    Hypertension: previously losartan weaned off, more htn evident. Now losartan restarted,  And currently losartan 25mg daily (hold SBP <130). No recent BP    Dementia: Continue Namenda 5 mg twice daily    Hypokalemia: last K 3.8 on 8/14/19, continue potassium 10mEq daily    Vit D def: started on D3 5000U  daily    Constipation: Continue MiraLAX daily as needed     Disposition: Wife has , now family was seeking placement at [facility] though now is in LTC , [facility] .       Electronically signed by: Stewart Garcia NP

## 2021-06-19 NOTE — LETTER
Letter by Stewart Garcia NP at      Author: Stewart Garcia NP Service: -- Author Type: --    Filed:  Encounter Date: 6/11/2019 Status: (Other)         Patient: Wilian Vallejo   MR Number: [id number]   YOB: 1933   Date of Visit: 6/11/2019     [facility]      Facility:    [facility] [[id number]]  Code Status: POLST AVAILABLE      Chief Complaint/Reason for Visit:    Chief Complaint   Patient presents with   ? Review Of Multiple Medical Conditions       HPI:   Wilian is a 85 y.o. male who is a recent transfer from [facility] with an admission on 5/28/19 and discharge on 5/30/19. He has a PMH of dementia, htn, TURP, who has a wife that has been recently hospitalized at [facility] and his health deteriorated while she was away. He was admitted for weakness and lethargy. He received IVF and thiazide was held. He may need LTC if wife unable to care for him. He was subsequently transferred to the TCU for rehab and possible change of disposition.      Past Medical History:  Past Medical History:   Diagnosis Date   ? BPH (benign prostatic hyperplasia)    ? Dementia    ? Essential hypertension    ? Inguinal hernia without obstruction or gangrene, recurrence not specified, unspecified laterality 04/02/2013    Left   ? Mild cognitive impairment with memory loss    ? S/P TURP    ? Weight loss            Surgical History:  Past Surgical History:   Procedure Laterality Date   ? COLONOSCOPY     ? EYE SURGERY     ? Fulgurate bleeders      evacuate clots   ? Laproscopic herniorrhaphy inguinal Bilateral    ? TRANSURETHRAL RESECTION OF BLADDER      Cytoscopy TURP combined       Family History:   Family History   Problem Relation Age of Onset   ? Heart disease Mother    ? Hypertension Mother    ? Heart disease Brother         Respiratory   ? Colon cancer Maternal Grandmother         colorectal       Social History:    Social History     Socioeconomic History   ?  Marital status: Patient Declined     Spouse name: Not on file   ? Number of children: Not on file   ? Years of education: Not on file   ? Highest education level: Not on file   Occupational History   ? Not on file   Social Needs   ? Financial resource strain: Not on file   ? Food insecurity:     Worry: Not on file     Inability: Not on file   ? Transportation needs:     Medical: Not on file     Non-medical: Not on file   Tobacco Use   ? Smoking status: Never Smoker   ? Smokeless tobacco: Never Used   Substance and Sexual Activity   ? Alcohol use: Yes     Comment: 1-2 per week   ? Drug use: Not on file   ? Sexual activity: Not on file   Lifestyle   ? Physical activity:     Days per week: Not on file     Minutes per session: Not on file   ? Stress: Not on file   Relationships   ? Social connections:     Talks on phone: Not on file     Gets together: Not on file     Attends Restoration service: Not on file     Active member of club or organization: Not on file     Attends meetings of clubs or organizations: Not on file     Relationship status: Not on file   ? Intimate partner violence:     Fear of current or ex partner: Not on file     Emotionally abused: Not on file     Physically abused: Not on file     Forced sexual activity: Not on file   Other Topics Concern   ? Incontinent Not Asked   ? Toileting independently Not Asked   ? Cognitive impairment Not Asked   ? Vision impairment Not Asked   ? Hearing impairment Not Asked   ? Dentures Not Asked   Social History Narrative   ? Not on file          Review of Systems   Constitutional: Positive for activity change. Negative for appetite change, chills, diaphoresis, fatigue and fever.        No issues   HENT: Negative for congestion and hearing loss.    Eyes: Negative.    Respiratory: Negative for shortness of breath and wheezing.    Cardiovascular: Negative.    Gastrointestinal: Negative.    Endocrine: Negative.    Genitourinary: Negative for difficulty urinating.    Musculoskeletal: Negative.    Skin:        Intact   Allergic/Immunologic: Negative.    Neurological: Negative for dizziness, speech difficulty and light-headedness.   Hematological: Negative.    Psychiatric/Behavioral: Positive for confusion. Negative for agitation and sleep disturbance. The patient is not nervous/anxious and is not hyperactive.        Vitals:    06/11/19 1214   BP: 128/79   Pulse: 68   Resp: 18   Temp: 97  F (36.1  C)   SpO2: 96%   Weight: 160 lb (72.6 kg)       Physical Exam   Constitutional: No distress.   No acute issues   HENT:   Head: Normocephalic and atraumatic.   Mouth/Throat: Oropharynx is clear and moist. No oropharyngeal exudate.   Eyes: Right eye exhibits no discharge. Left eye exhibits no discharge. No scleral icterus.   Neck: Normal range of motion. Neck supple. No JVD present.   Cardiovascular: Normal rate. Exam reveals no gallop and no friction rub.   No murmur heard.  Pulmonary/Chest: Effort normal. No stridor. No respiratory distress. He has no wheezes. He has no rales.   CTA, RA   Abdominal: Soft. Bowel sounds are normal. He exhibits no distension. There is no tenderness. There is no rebound.   Denies constipation or diarrhea   Genitourinary:   Genitourinary Comments: deferred   Musculoskeletal: He exhibits no edema, tenderness or deformity.   Denies pain, uses wc mainly   Neurological: He is alert. No cranial nerve deficit.   A/O x1-2   Skin: Skin is warm and dry. He is not diaphoretic.   Bruised R arm   Psychiatric: He has a normal mood and affect.   Denies anxiety or depression   Vitals reviewed.      Medication List:  Current Outpatient Medications   Medication Sig   ? acetaminophen (TYLENOL) 325 MG tablet Take 650 mg by mouth every 6 (six) hours as needed for pain (Give 2 tablets by mouth every 6 hour as needed for pain).   ? memantine (NAMENDA) 5 MG tablet Take 5 mg by mouth 2 (two) times a day.   ? polyethylene glycol (MIRALAX) 17 gram packet Take 17 g by mouth daily as  needed (Give 17 gram by mouth as needed daily for constipation. Give by mouth or nasogastric tube).       Labs:  Results for orders placed or performed in visit on 19   Basic Metabolic Panel   Result Value Ref Range    Sodium 137 136 - 145 mmol/L    Potassium 3.4 (L) 3.5 - 5.0 mmol/L    Chloride 103 98 - 107 mmol/L    CO2 24 22 - 31 mmol/L    Anion Gap, Calculation 10 5 - 18 mmol/L    Glucose 95 70 - 125 mg/dL    Calcium 9.2 8.5 - 10.5 mg/dL    BUN 32 (H) 8 - 28 mg/dL    Creatinine 1.00 0.70 - 1.30 mg/dL    GFR MDRD Af Amer >60 >60 mL/min/1.73m2    GFR MDRD Non Af Amer >60 >60 mL/min/1.73m2     WBC 12.2  Hgb 13.6  MCV 93  TSH 0.60    Assessment/Plan:    Weakness and lethargy: resolved    Pain control: Continue Tylenol 650 every 6 hours as needed    Hypertension: dc losartan, SBP <140    Dementia: Continue Namenda 5 mg twice daily    Hypokalemia: last K 3.4, start potassium 10mEq daily, recheck BMP and mag today    Constipation: Continue MiraLAX daily as needed     Disposition: Wife has , now family seeking placement at [facility] .     MEDICAL EQUIPMENT NEEDS:  wheelchair    DISCHARGE PLAN/FACE TO FACE:  I certify that services are/were furnished while this patient was under the care of a physician and that a physician or an allowed non-physician practitioner (NPP), had a face-to-face encounter that meets the physician face-to-face encounter requirements. The encounter was in whole, or in part, related to the primary reason for home health. The patient is confined to his/her home and needs intermittent skilled nursing, physical therapy, speech-language pathology, or the continued need for occupational therapy. A plan of care has been established by a physician and is periodically reviewed by a physician.  Date of Face-to-Face Encounter: 19    I certify that, based on my findings, the following services are medically necessary home health services:      Patient is 5 feet 8 inches tall  and weighs 160 pounds.  Patient has a change in condition that necessitates a mobility device that cannot be resolved by the fitted cane or walker.  Patient has multiple diagnoses that limit functional mobility including Alzheimer's (G 30.9) and muscle weakness (M 62.81).  Patient requires a standard wheelchair, seat cushion and bilateral lower extremity foot rest.  Patient has mobility limitation that prevents him from caption in [facility] such as feeding, dressing, grooming and bathing.  Patient's nursing home will provide adequate access between rooms, maneuvering space and surfaces for the use of a manual wheelchair that is provided.  The use of manual wheelchair was sniffily improve the patient's ability to participate in  ADLs and he has not expressed unwillingness to use said wheelchair on a regular basis.  Patient has sufficient upper extremity very function and other physical and mental abilities needed to safely propel the wheelchair that is provided, that will be used on a daily basis.  Patient also has a caregiver, family and facility staff who are able and willing to provide assistance to use a manual wheelchair.  This will be for lifetime use.      Electronically signed by: Stewart Garcia NP

## 2021-06-19 NOTE — LETTER
Letter by Stewart Garcia NP at      Author: Stewart Garcia NP Service: -- Author Type: --    Filed:  Encounter Date: 10/25/2019 Status: Signed         Patient: Wilian Vallejo   MR Number: [id number]   YOB: 1933   Date of Visit: 10/25/2019     [facility]      Facility:    [facility] [[id number]]  Code Status: POLST AVAILABLE      Chief Complaint/Reason for Visit:    Chief Complaint   Patient presents with   ? Review Of Multiple Medical Conditions       HPI:   Wilian is a 86 y.o. male who initially was a transfer from [facility] with an admission on 5/28/19 and discharge on 5/30/19. He has a PMH of dementia, htn, TURP, who has a [place] that has [place] [facility] [place] health deteriorated while she was away. He was admitted for weakness and lethargy. He received IVF and thiazide was held. He may need LTC if wife unable to care for him. He was subsequently transferred to the TCU for rehab and possible change of disposition.    LTC:   He finished his TCU stay and now resides in LTC as of 6/2019. He is wc bound with no behaviors reported. He has limited concerns. Patient denies pain, headache, chest pain, numbness or tingling, shortest of breath, eating or swallowing concerns, nausea or vomiting, diarrhea or bowel abnormalities, or no new integumentary concerns today. No change today.      Past Medical History:  Past Medical History:   Diagnosis Date   ? BPH (benign prostatic hyperplasia)    ? Dementia    ? Essential hypertension    ? Inguinal hernia without obstruction or gangrene, recurrence not specified, unspecified laterality 04/02/2013    Left   ? Mild cognitive impairment with memory loss    ? S/P TURP    ? Weight loss            Surgical History:  Past Surgical History:   Procedure Laterality Date   ? COLONOSCOPY     ? EYE SURGERY     ? Fulgurate bleeders      evacuate clots   ? Laproscopic herniorrhaphy inguinal  Bilateral    ? TRANSURETHRAL RESECTION OF BLADDER      Cytoscopy TURP combined       Family History:   Family History   Problem Relation Age of Onset   ? Heart disease Mother    ? Hypertension Mother    ? Heart disease Brother         Respiratory   ? Colon cancer Maternal Grandmother         colorectal       Social History:    Social History     Socioeconomic History   ? Marital status: Patient Declined     Spouse name: Not on file   ? Number of children: Not on file   ? Years of education: Not on file   ? Highest education level: Not on file   Occupational History   ? Not on file   Social Needs   ? Financial resource strain: Not on file   ? Food insecurity:     Worry: Not on file     Inability: Not on file   ? Transportation needs:     Medical: Not on file     Non-medical: Not on file   Tobacco Use   ? Smoking status: Never Smoker   ? Smokeless tobacco: Never Used   Substance and Sexual Activity   ? Alcohol use: Yes     Comment: 1-2 per week   ? Drug use: Not on file   ? Sexual activity: Not on file   Lifestyle   ? Physical activity:     Days per week: Not on file     Minutes per session: Not on file   ? Stress: Not on file   Relationships   ? Social connections:     Talks on phone: Not on file     Gets together: Not on file     Attends Orthodox service: Not on file     Active member of club or organization: Not on file     Attends meetings of clubs or organizations: Not on file     Relationship status: Not on file   ? Intimate partner violence:     Fear of current or ex partner: Not on file     Emotionally abused: Not on file     Physically abused: Not on file     Forced sexual activity: Not on file   Other Topics Concern   ? Incontinent Not Asked   ? Toileting independently Not Asked   ? Cognitive impairment Not Asked   ? Vision impairment Not Asked   ? Hearing impairment Not Asked   ? Dentures Not Asked   Social History Narrative   ? Not on file          Review of Systems   Constitutional: Negative for activity  change, appetite change, chills, diaphoresis, fatigue and fever.        No concerns   HENT: Negative for congestion and hearing loss.    Eyes: Negative.    Respiratory: Negative for shortness of breath and wheezing.    Cardiovascular: Negative.    Gastrointestinal: Negative.    Endocrine: Negative.    Genitourinary: Negative for difficulty urinating.   Musculoskeletal: Negative.    Skin:        Intact   Allergic/Immunologic: Negative.    Neurological: Negative for dizziness, speech difficulty and light-headedness.   Hematological: Negative.    Psychiatric/Behavioral: Positive for confusion. Negative for agitation and sleep disturbance. The patient is not nervous/anxious and is not hyperactive.         Baseline       Vitals:    10/27/19 1230   BP: 146/87   Pulse: 86   Resp: 18   Temp: 98  F (36.7  C)   SpO2: 96%   Weight: 152 lb (68.9 kg)       Physical Exam   Constitutional: No distress.   No acute issues   HENT:   Head: Normocephalic and atraumatic.   Mouth/Throat: Oropharynx is clear and moist. No oropharyngeal exudate.   Eyes: Right eye exhibits no discharge. Left eye exhibits no discharge. No scleral icterus.   Neck: Normal range of motion. Neck supple. No JVD present.   Cardiovascular: Normal rate. Exam reveals no gallop and no friction rub.   No murmur heard.  Pulmonary/Chest: Effort normal. No stridor. No respiratory distress. He has no wheezes. He has no rales.   CTA, RA   Abdominal: Soft. Bowel sounds are normal. He exhibits no distension. There is no tenderness. There is no rebound.   Denies constipation or diarrhea   Genitourinary:    Genitourinary Comments: deferred   Musculoskeletal:         General: No tenderness, deformity or edema.      Comments: Denies pain, wc bound     Neurological: He is alert. No cranial nerve deficit.   A/O x1   Skin: Skin is warm and dry. He is not diaphoretic.   intact   Psychiatric: He has a normal mood and affect.   Denies anxiety or depression   Vitals  reviewed.      Medication List:  Current Outpatient Medications   Medication Sig   ? potassium chloride (KLOR-CON) 10 MEQ CR tablet Take 10 mEq by mouth daily.   ? acetaminophen (TYLENOL) 325 MG tablet Take 650 mg by mouth every 6 (six) hours as needed for pain (Give 2 tablets by mouth every 6 hour as needed for pain).   ? losartan (COZAAR) 50 MG tablet Take 25 mg by mouth daily. Hold for SBP <130 or DBP <85          ? memantine (NAMENDA) 5 MG tablet Take 5 mg by mouth 2 (two) times a day.   ? polyethylene glycol (MIRALAX) 17 gram packet Take 17 g by mouth daily as needed (Give 17 gram by mouth as needed daily for constipation. Give by mouth or nasogastric tube).       Labs:  Results for orders placed or performed in visit on 08/14/19   Basic Metabolic Panel   Result Value Ref Range    Sodium 139 136 - 145 mmol/L    Potassium 3.8 3.5 - 5.0 mmol/L    Chloride 105 98 - 107 mmol/L    CO2 27 22 - 31 mmol/L    Anion Gap, Calculation 7 5 - 18 mmol/L    Glucose 76 70 - 125 mg/dL    Calcium 9.4 8.5 - 10.5 mg/dL    BUN 16 8 - 28 mg/dL    Creatinine 0.87 0.70 - 1.30 mg/dL    GFR MDRD Af Amer >60 >60 mL/min/1.73m2    GFR MDRD Non Af Amer >60 >60 mL/min/1.73m2     Lab Results   Component Value Date    WBC 9.6 08/14/2019    HGB 13.9 (L) 08/14/2019    HCT 42.1 08/14/2019    MCV 96 08/14/2019     08/14/2019     Lab Results   Component Value Date    TSH 0.87 08/14/2019     Vitamin B-12   Date Value Ref Range Status   08/14/2019 463 213 - 816 pg/mL Final     Vitamin D, Total (25-Hydroxy)   Date Value Ref Range Status   08/14/2019 9.5 (L) 30.0 - 80.0 ng/mL Final     Mag 1.9      Assessment/Plan:    Weakness and lethargy: resolved    Pain control: Continue Tylenol 650 every 6 hours as needed, denies pain    Hypertension: previously losartan weaned off, more htn evident. Now losartan restarted,  And currently losartan 25mg daily. SBP <150    Dementia: Continue Namenda 5 mg twice daily    Hypokalemia: last K 3.8 on 8/14/19,  continue potassium 10mEq daily    Vit D def: continue D3 5000U daily    Insomnia: melatonin discontinued    Constipation: Continue MiraLAX daily as needed     Disposition: Wife has , now family was seeking placement at [facility] though now resides in [facility] .       Electronically signed by: Stewart Garcia NP

## 2021-06-19 NOTE — LETTER
Letter by Radha Davidson NP at      Author: Radha Davidson NP Service: -- Author Type: --    Filed:  Encounter Date: 2019 Status: (Other)         Patient: Wilian Vallejo   MR Number: [id number]   YOB: 1933   Date of Visit: 2019                 [facility]    DATE: 2019    NAME:  Wilian Vallejo             :  1933  MRN: [id number]  CODE STATUS:  FULL CODE    VISIT TYPE: Review Of Multiple Medical Conditions     FACILITY:  [facility] [[id number]]       CHIEF COMPLAIN/REASON FOR VISIT:    Chief Complaint   Patient presents with   ? Review Of Multiple Medical Conditions               HISTORY OF PRESENT ILLNESS: Wilian Vallejo is a 85 y.o. male who was admitted - for weakness and lethargy. He was treated for RUY. He was transferred to TCU for further rehab and conditioning. His wife was having difficulty caring for him at home. He has PMH of dementia, HTN, TURP, BPH, cognitive impairment.     Today Mr. Vallejo states he is resting. He is wondering where he needs to be going today. He has his stuff packed up. He reports he does not always use the walker because he doesn't think he needs to. He denies any pain today and not having any breathing issues. He doesn't think he is having bowel issues. He denies any other concerns today. He does not always answer questions appropriately. He is very confused today. Per staff no recent concerns and no behaviors. He is very confused but pleasant. He has been eating fine and not complaining of any pain or other concerns. Per staff his wife recently passed away. They are working on long term care placement for him now.     REVIEW OF SYSTEMS:  PROBLEMS AND REVIEW OF SYSTEMS:   Today on ROS:   Currently, no fever, chills, or rigors. Decreased vision and hearing. Denies any chest pain, shortness of breath, or cough. Appetite is good. Denies any GERD symptoms. Denies any difficulty with  swallowing, nausea, or vomiting.  Denies any abdominal pain, diarrhea or constipation. No insomnia. Positive for confusion, ambulates with rolling walker      No Known Allergies  Current Outpatient Medications   Medication Sig   ? acetaminophen (TYLENOL) 325 MG tablet Take 650 mg by mouth every 6 (six) hours as needed for pain (Give 2 tablets by mouth every 6 hour as needed for pain).   ? memantine (NAMENDA) 5 MG tablet Take 5 mg by mouth 2 (two) times a day.   ? polyethylene glycol (MIRALAX) 17 gram packet Take 17 g by mouth daily as needed (Give 17 gram by mouth as needed daily for constipation. Give by mouth or nasogastric tube).     Past Medical History:    Past Medical History:   Diagnosis Date   ? BPH (benign prostatic hyperplasia)    ? Dementia    ? Essential hypertension    ? Inguinal hernia without obstruction or gangrene, recurrence not specified, unspecified laterality 04/02/2013    Left   ? Mild cognitive impairment with memory loss    ? S/P TURP    ? Weight loss            PHYSICAL EXAMINATION  Vitals:    06/25/19 0700   BP: 120/80   Pulse: 62   Resp: 20   Temp: 97.2  F (36.2  C)   SpO2: 97%   Weight: 161 lb (73 kg)       Today on physical exam:     GENERAL: Awake, Alert, oriented x1, not in any form of acute distress, conversant  HEENT: Head is normocephalic with normal hair distribution. No evidence of trauma. Ears: No acute purulent discharge. Eyes: Conjunctivae pink with no scleral jaundice. Nose: Normal mucosa and septum. NECK: Supple with no cervical or supraclavicular lymphadenopathy. Trachea is midline.   CHEST: No tenderness or deformity, no crepitus  LUNG: dim to auscultation with good chest expansion. There are no crackles or wheezes, normal AP diameter.  BACK: No kyphosis of the thoracic spine. Symmetric, no curvature, ROM normal, no CVA tenderness, no spinal tenderness   CVS: There is good S1  S2, regular rhythm, there are no murmurs, rubs, gallops, or heaves,  2+ pulses symmetric in all  extremities.  ABDOMEN: Rounded and soft, nontender to palpation, non distended, no masses, no organomegaly, good bowel sounds, no rebound or guarding, no peritoneal signs.   EXTREMITIES: No pedal edema, Atraumatic. Full range of motion on both upper and lower extremities, there is no tenderness to palpation, no cyanosis or clubbing, no calf tenderness.  Pulses equal in all extremities, normal cap refill, no joint swelling.  SKIN: Warm and dry, no erythema noted.  Skin color, texture, no rashes or lesions.  NEUROLOGICAL: The patient is oriented to person. Pleasantly confused, not always answers questions appropriately, calm            LABS:   No results found for this or any previous visit (from the past 168 hour(s)).  Results for orders placed or performed in visit on 06/18/19   Basic Metabolic Panel   Result Value Ref Range    Sodium 140 136 - 145 mmol/L    Potassium 4.4 3.5 - 5.0 mmol/L    Chloride 106 98 - 107 mmol/L    CO2 27 22 - 31 mmol/L    Anion Gap, Calculation 7 5 - 18 mmol/L    Glucose 72 70 - 125 mg/dL    Calcium 9.3 8.5 - 10.5 mg/dL    BUN 13 8 - 28 mg/dL    Creatinine 0.91 0.70 - 1.30 mg/dL    GFR MDRD Af Amer >60 >60 mL/min/1.73m2    GFR MDRD Non Af Amer >60 >60 mL/min/1.73m2         Lab Results   Component Value Date    WBC 12.5 (H) 06/03/2019    HGB 14.5 06/03/2019    HCT 42.9 06/03/2019    MCV 95 06/03/2019     06/03/2019       No results found for: NCTTYCBY10  No results found for: HGBA1C  No results found for: INR, PROTIME  No results found for: HUWWQLHR80EY  No results found for: TSH        ASSESSMENT/PLAN:    1. Dementia: No agitation, anxiety, behaviors. A/o x 1 today. On namenda. Slums 7.   2. HTN: SBP 120s. Off meds. Stable.   3. Constipation: Miralax daily prn. No recent concerns.   4. Hypokalemia: on kcl. Stable.   5. Physical deconditioning: PT, OT. Resolved, appears back to baseline. Ambulating with walker. Awaiting placement.     Per therapy lives in 2 story house with 3-4 steps  and one rail to enter, wife previously passed and was primary caregiver. looking at [facility] placement. Stay long term care until find placement. 7 on slums, 23 on tinnetti. Sup and safety cues for ambulation, no assistive device up to 500 feet, mod-max for dressing, sba for toileting.     Electronically signed by: Radha Davidson NP

## 2021-06-19 NOTE — LETTER
Letter by Dinora Marin MBBS at      Author: Dinora Marin MBBS Service: -- Author Type: --    Filed:  Encounter Date: 7/17/2019 Status: (Other)         Patient: Wilian Vallejo   MR Number: [id number]   YOB: 1933   Date of Visit: 7/17/2019       [facility] Admission note      Patient: Wilian Vallejo  MRN: [id number]  Date of Service: 7/17/2019      [facility] [[id number]]  Reason for Visit     Chief Complaint   Patient presents with   ? H & P       Code Status     FULL CODE    Assessment     Dementia currently on Namenda SLUMS 7/30  History of hypertension previously he was taken off losartan but now with rebounding blood pressures he is on a low-dose of losartan 50 mg with hold parameters given for low blood pressure  Significant lymphedema bilateral lower extremities  Insomnia reported by staff ongoing for the last few days  History of hypokalemia on potassium supplementation.  Last BMP on 7/11/2019 with a potassium of 3.9  Debilitation generalized weakness    Plan     Patient was initially admitted to the TCU and participated in therapy.  He was medically felt to be stable and ready to discharge.  Family was initially looking for assisted living placement but currently has opted to move him to long-term care.  Care plan was reviewed with staff in light of new onset lymphedema and it seems he has not been able to sleep I am requesting  to intervene.  His roommate is quite disruptive and I suspect that may be causing his insomnia so we are requesting either a room change or some other modalities to prevent him from getting disturbed all night.  He was noted to be quite agitated and also had an elopement episode he was trying to get his car and was resistant to being redirected however nursing did update me that his roommate was yelling at that time.  In light of profound insomnia that he is having I am going to add melatonin 3 mg daily to his  regimen.  Also ordered trazodone 50 mg as needed at bedtime if he has persistent insomnia.  Tubigrip stockings have been ordered for his lymphedema concerns.  Staff also advised to elevate his legs and start him on some supplements if there is concern about not eating well and malnutrition.  Continue to monitor blood pressures he has hold parameters based on his blood pressure numbers for his losartan.  Continue with his current care plan monitor mood and behaviors    History     Patient is a very pleasant 85 y.o. male who is admitted to LTC  Patient was admitted to TCU recently on 6/3/2019 after a short stay at [facility].  He was admitted with acute dementia.  He was noted to have a slums of 7/30.  He has had profound cognitive decline and was being taken care of at home with his wife.  Unfortunately she became sick and passed away in the hospital and family moved him to TCU looking for alternative placement they have elected to move him to long-term care now.  He also has a history of hypertension he was noted to have significant hypotension on admission and was taken off medications subsequently blood pressures have rebounded and he is now on a low-dose of losartan at 50 mg  He also has a history of insomnia as per staff he has not been sleeping last night he was up all night unfortunately I am not sure how much of it is insomnia and how much is related to psychosocial stressors with his roommate being very disruptive and not sleeping all night.  He was also noted to have significant edema in his leg staff is reporting some medications versus treatments for it however in questioning it seems he was sitting up in a chair with his legs dangling down and not sleeping for the last 2 nights and wondering if this is dependent edema stemming from that.  Unfortunately remains a poor historian due to underlying history of dementia    Past Medical History     Active Ambulatory (Non-Hospital) Problems    Diagnosis   ?  Weakness   ? Alzheimer's dementia without behavioral disturbance   ? Essential hypertension   ? Slow transit constipation   ? Hypokalemia     Past Medical History:   Diagnosis Date   ? BPH (benign prostatic hyperplasia)    ? Dementia    ? Essential hypertension    ? Inguinal hernia without obstruction or gangrene, recurrence not specified, unspecified laterality 04/02/2013   ? Mild cognitive impairment with memory loss    ? S/P TURP    ? Weight loss        Past Social History     Reviewed, and he  reports that he has never smoked. He has never used smokeless tobacco. He reports that he drinks alcohol.    Family History     Reviewed, and family history includes Colon cancer in his maternal grandmother; Heart disease in his brother and mother; Hypertension in his mother.    Medication List     Current Outpatient Medications on File Prior to Visit   Medication Sig Dispense Refill   ? acetaminophen (TYLENOL) 325 MG tablet Take 650 mg by mouth every 6 (six) hours as needed for pain (Give 2 tablets by mouth every 6 hour as needed for pain).     ? losartan (COZAAR) 50 MG tablet Take 50 mg by mouth daily. Hold for SBP <130 or DBP <85     ? memantine (NAMENDA) 5 MG tablet Take 5 mg by mouth 2 (two) times a day.     ? polyethylene glycol (MIRALAX) 17 gram packet Take 17 g by mouth daily as needed (Give 17 gram by mouth as needed daily for constipation. Give by mouth or nasogastric tube).       No current facility-administered medications on file prior to visit.        Allergies     No Known Allergies    Review of Systems   A comprehensive review of 14 systems was done. Pertinent findings noted here and in history of present illness. All the rest negative.  Constitutional: Negative.  Negative for fever, chills, he has activity change, appetite change and fatigue.   HENT: Negative for congestion and facial swelling.    Eyes: Negative for photophobia, redness and visual disturbance.   Respiratory: Negative for cough and chest  tightness.    Cardiovascular: Negative for chest pain, palpitations and has significant leg swelling.   Gastrointestinal: Negative for nausea, diarrhea, constipation, blood in stool and abdominal distention.   Genitourinary: Negative.    Musculoskeletal: Negative.  Has difficulty walking does use a walker  Skin: Negative.    Neurological: Negative for dizziness, tremors, syncope, weakness, light-headedness and headaches.   Hematological: Does not bruise/bleed easily.   Psychiatric/Behavioral: Negative.  Fused having significant lymphedema      Physical Exam     Recent Vitals 7/9/2019   Weight 160 lbs   /90   Pulse 75   Temp 98   SpO2 94   Some recent data might be hidden       Constitutional: Oriented to person, and appears well-developed.   HEENT:  Normocephalic and atraumatic.  Eyes: Conjunctivae and EOM are normal. Pupils are equal, round, and reactive to light. No discharge.  No scleral icterus. Nose normal. Mouth/Throat: Oropharynx is clear and moist. No oropharyngeal exudate.    NECK: Normal range of motion. Neck supple. No JVD present. No tracheal deviation present. No thyromegaly present.   CARDIOVASCULAR: Normal rate, regular rhythm and intact distal pulses.  Exam reveals no gallop and no friction rub.  Systolic murmur present.  PULMONARY: Effort normal and breath sounds normal. No respiratory distress.No Wheezing or rales.  ABDOMEN: Soft. Bowel sounds are normal. No distension and no mass.  There is no tenderness. There is no rebound and no guarding. No HSM.  MUSCULOSKELETAL: Normal range of motion.  2+ leg edema and no tenderness. Mild kyphosis, no tenderness.  LYMPH NODES: Has no cervical, supraclavicular, axillary and groin adenopathy.   NEUROLOGICAL: Alert and oriented to person, . No cranial nerve deficit.  Normal muscle tone. Coordination normal.   GENITOURINARY: Deferred exam.  SKIN: Skin is warm and dry. No rash noted. No erythema. No pallor.   EXTREMITIES: No cyanosis, no clubbing, 2+ leg  edema. No Deformity.  PSYCHIATRIC: Normal mood, affect and behavior.  Recall is impaired      Lab Results       Lab Results   Component Value Date    BUN 18 07/11/2019    CALCIUM 9.0 07/11/2019     07/11/2019    CO2 25 07/11/2019    CREATININE 0.95 07/11/2019    GFRAA >60 07/11/2019    GFRNONAA >60 07/11/2019    GLU 76 07/11/2019    HCT 42.9 06/03/2019    WBC 12.5 (H) 06/03/2019    HGB 14.5 06/03/2019    MG 1.9 06/11/2019     06/03/2019    K 3.9 07/11/2019     07/11/2019             SOLOMON Marshall

## 2021-06-19 NOTE — LETTER
Letter by Stewart Garcia NP at      Author: Stewart Garcia NP Service: -- Author Type: --    Filed:  Encounter Date: 6/4/2019 Status: (Other)         Patient: Wilian Vallejo   MR Number: [id number]   YOB: 1933   Date of Visit: 6/4/2019     [facility]      Facility:    [facility] [[id number]]  Code Status: POLST AVAILABLE      Chief Complaint/Reason for Visit:    Chief Complaint   Patient presents with   ? Review Of Multiple Medical Conditions       HPI:   Wilian is a 85 y.o. male who is a recent transfer from [facility] with an admission on 5/28/19 and discharge on 5/30/19. He has a PMH of dementia, htn, TURP, who has a wife that has been recently hospitalized at [facility] and his health deteriorated while she was away. He was admitted for weakness and lethargy. He received IVF and thiazide was held. He may need LTC if wife unable to care for him. He was subsequently transferred to the TCU for rehab and possible change of disposition.    Patient has no concerns today. He denies pain, headache, chest pain, numbness or tingling, shortest of breath, eating or swallowing concerns, nausea or vomiting, diarrhea or bowel abnormalities, or no new integumentary concerns today.  No change today.      Past Medical History:  Past Medical History:   Diagnosis Date   ? BPH (benign prostatic hyperplasia)    ? Dementia    ? Essential hypertension    ? Inguinal hernia without obstruction or gangrene, recurrence not specified, unspecified laterality 04/02/2013    Left   ? Mild cognitive impairment with memory loss    ? S/P TURP    ? Weight loss            Surgical History:  Past Surgical History:   Procedure Laterality Date   ? COLONOSCOPY     ? EYE SURGERY     ? Fulgurate bleeders      evacuate clots   ? Laproscopic herniorrhaphy inguinal Bilateral    ? TRANSURETHRAL RESECTION OF BLADDER      Cytoscopy TURP combined       Family History:   Family History    Problem Relation Age of Onset   ? Heart disease Mother    ? Hypertension Mother    ? Heart disease Brother         Respiratory   ? Colon cancer Maternal Grandmother         colorectal       Social History:    Social History     Socioeconomic History   ? Marital status: Patient Declined     Spouse name: Not on file   ? Number of children: Not on file   ? Years of education: Not on file   ? Highest education level: Not on file   Occupational History   ? Not on file   Social Needs   ? Financial resource strain: Not on file   ? Food insecurity:     Worry: Not on file     Inability: Not on file   ? Transportation needs:     Medical: Not on file     Non-medical: Not on file   Tobacco Use   ? Smoking status: Never Smoker   ? Smokeless tobacco: Never Used   Substance and Sexual Activity   ? Alcohol use: Yes     Comment: 1-2 per week   ? Drug use: Not on file   ? Sexual activity: Not on file   Lifestyle   ? Physical activity:     Days per week: Not on file     Minutes per session: Not on file   ? Stress: Not on file   Relationships   ? Social connections:     Talks on phone: Not on file     Gets together: Not on file     Attends Christian service: Not on file     Active member of club or organization: Not on file     Attends meetings of clubs or organizations: Not on file     Relationship status: Not on file   ? Intimate partner violence:     Fear of current or ex partner: Not on file     Emotionally abused: Not on file     Physically abused: Not on file     Forced sexual activity: Not on file   Other Topics Concern   ? Incontinent Not Asked   ? Toileting independently Not Asked   ? Cognitive impairment Not Asked   ? Vision impairment Not Asked   ? Hearing impairment Not Asked   ? Dentures Not Asked   Social History Narrative   ? Not on file          Review of Systems   Constitutional: Positive for activity change. Negative for appetite change, chills, diaphoresis, fatigue and fever.        No issues   HENT: Negative for  congestion and hearing loss.    Eyes: Negative.    Respiratory: Negative for shortness of breath and wheezing.    Cardiovascular: Negative.    Gastrointestinal: Negative.    Endocrine: Negative.    Genitourinary: Negative for difficulty urinating.   Musculoskeletal: Negative.    Skin:        Intact   Allergic/Immunologic: Negative.    Neurological: Negative for dizziness, speech difficulty and light-headedness.   Hematological: Negative.    Psychiatric/Behavioral: Positive for confusion. Negative for agitation and sleep disturbance. The patient is not nervous/anxious and is not hyperactive.        Vitals:    06/04/19 1237   BP: 91/61   Pulse: 78   Resp: 18   Temp: 98  F (36.7  C)   SpO2: 92%   Weight: 160 lb (72.6 kg)       Physical Exam   Constitutional: No distress.   No acute issues   HENT:   Head: Normocephalic and atraumatic.   Mouth/Throat: Oropharynx is clear and moist. No oropharyngeal exudate.   Eyes: Right eye exhibits no discharge. Left eye exhibits no discharge. No scleral icterus.   Neck: Normal range of motion. Neck supple. No JVD present.   Cardiovascular: Normal rate. Exam reveals no gallop and no friction rub.   No murmur heard.  Pulmonary/Chest: Effort normal. No stridor. No respiratory distress. He has no wheezes. He has no rales.   CTA, RA   Abdominal: Soft. Bowel sounds are normal. He exhibits no distension. There is no tenderness. There is no rebound.   Denies constipation or diarrhea   Genitourinary:   Genitourinary Comments: deferred   Musculoskeletal: He exhibits no edema, tenderness or deformity.   Denies pain, uses wc mainly   Neurological: He is alert. No cranial nerve deficit.   A/O x2   Skin: Skin is warm and dry. He is not diaphoretic.   Bruised R arm   Psychiatric: He has a normal mood and affect.   Denies anxiety or depression   Vitals reviewed.      Medication List:  Current Outpatient Medications   Medication Sig   ? acetaminophen (TYLENOL) 325 MG tablet Take 650 mg by mouth every  6 (six) hours as needed for pain (Give 2 tablets by mouth every 6 hour as needed for pain).   ? losartan (COZAAR) 50 MG tablet Take 25 mg by mouth daily. Hold for SBP <130         ? memantine (NAMENDA) 5 MG tablet Take 5 mg by mouth 2 (two) times a day.   ? polyethylene glycol (MIRALAX) 17 gram packet Take 17 g by mouth daily as needed (Give 17 gram by mouth as needed daily for constipation. Give by mouth or nasogastric tube).       Labs:  Results for orders placed or performed in visit on 06/03/19   Basic Metabolic Panel   Result Value Ref Range    Sodium 137 136 - 145 mmol/L    Potassium 3.4 (L) 3.5 - 5.0 mmol/L    Chloride 103 98 - 107 mmol/L    CO2 24 22 - 31 mmol/L    Anion Gap, Calculation 10 5 - 18 mmol/L    Glucose 95 70 - 125 mg/dL    Calcium 9.2 8.5 - 10.5 mg/dL    BUN 32 (H) 8 - 28 mg/dL    Creatinine 1.00 0.70 - 1.30 mg/dL    GFR MDRD Af Amer >60 >60 mL/min/1.73m2    GFR MDRD Non Af Amer >60 >60 mL/min/1.73m2     WBC 12.2  Hgb 13.6  MCV 93  TSH 0.60    Assessment/Plan:    Weakness and lethargy: resolved    Pain control: Continue Tylenol 650 every 6 hours as needed    Hypertension: decrease losartan to 25mg daily (hold for SBP <130)    Dementia: Continue Namenda 5 mg twice daily    Hypokalemia: last K 3.4, start potassium 10mEq daily, recheck BMP and mag    Constipation: Continue MiraLAX daily as needed     Disposition: To be determined based on wife's capability, pending cognitive assessment      Electronically signed by: Stewart Garcia, SARAI

## 2021-06-19 NOTE — LETTER
Letter by Dinora Marin MBBS at      Author: Dinora Marin MBBS Service: -- Author Type: --    Filed:  Encounter Date: 6/10/2019 Status: (Other)         Patient: Wilian Vallejo   MR Number: [id number]   YOB: 1933   Date of Visit: 6/10/2019     [facility]      Code Status:  FULL CODE  Visit Type: Review Of Multiple Medical Conditions     Facility:  [facility] [[id number]]           History of Present Illness: Wilian Vallejo is a 85 y.o. male who is currently admitted to the TCU.  He has profound dementia due to which placement issues are being looked into.  He was being cared for his wife who recently .  Recheck slums is .  Physical he remains debilitated requiring assistance with his walker he is walking about 25 feet currently denies any pain or discomfort.  Blood pressures have been consistently low since admission.   he has been taken off losartan and put on hold parameters with a lower dose of 25 mg daily some blood pressures are still high  Recheck labs have shown a slightly low potassium at 3.4 he remains on potassium supplementation      Past Medical History:   Diagnosis Date   ? BPH (benign prostatic hyperplasia)    ? Dementia    ? Essential hypertension    ? Inguinal hernia without obstruction or gangrene, recurrence not specified, unspecified laterality 2013    Left   ? Mild cognitive impairment with memory loss    ? S/P TURP    ? Weight loss      Past Surgical History:   Procedure Laterality Date   ? COLONOSCOPY     ? EYE SURGERY     ? Fulgurate bleeders      evacuate clots   ? Laproscopic herniorrhaphy inguinal Bilateral    ? TRANSURETHRAL RESECTION OF BLADDER      Cytoscopy TURP combined     Family History   Problem Relation Age of Onset   ? Heart disease Mother    ? Hypertension Mother    ? Heart disease Brother         Respiratory   ? Colon cancer Maternal Grandmother         colorectal     Social History     Socioeconomic  History   ? Marital status: Patient Declined     Spouse name: Not on file   ? Number of children: Not on file   ? Years of education: Not on file   ? Highest education level: Not on file   Occupational History   ? Not on file   Social Needs   ? Financial resource strain: Not on file   ? Food insecurity:     Worry: Not on file     Inability: Not on file   ? Transportation needs:     Medical: Not on file     Non-medical: Not on file   Tobacco Use   ? Smoking status: Never Smoker   ? Smokeless tobacco: Never Used   Substance and Sexual Activity   ? Alcohol use: Yes     Comment: 1-2 per week   ? Drug use: Not on file   ? Sexual activity: Not on file   Lifestyle   ? Physical activity:     Days per week: Not on file     Minutes per session: Not on file   ? Stress: Not on file   Relationships   ? Social connections:     Talks on phone: Not on file     Gets together: Not on file     Attends Restorationism service: Not on file     Active member of club or organization: Not on file     Attends meetings of clubs or organizations: Not on file     Relationship status: Not on file   ? Intimate partner violence:     Fear of current or ex partner: Not on file     Emotionally abused: Not on file     Physically abused: Not on file     Forced sexual activity: Not on file   Other Topics Concern   ? Incontinent Not Asked   ? Toileting independently Not Asked   ? Cognitive impairment Not Asked   ? Vision impairment Not Asked   ? Hearing impairment Not Asked   ? Dentures Not Asked   Social History Narrative   ? Not on file     Current Outpatient Medications   Medication Sig Dispense Refill   ? acetaminophen (TYLENOL) 325 MG tablet Take 650 mg by mouth every 6 (six) hours as needed for pain (Give 2 tablets by mouth every 6 hour as needed for pain).     ? losartan (COZAAR) 50 MG tablet Take 25 mg by mouth daily. Hold for SBP <130           ? memantine (NAMENDA) 5 MG tablet Take 5 mg by mouth 2 (two) times a day.     ? polyethylene glycol  (MIRALAX) 17 gram packet Take 17 g by mouth daily as needed (Give 17 gram by mouth as needed daily for constipation. Give by mouth or nasogastric tube).       No current facility-administered medications for this visit.      No Known Allergies      Review of Systems:    Constitutional: Positive for fatigue  Negative for fever, chills  HENT: Negative for congestion   Eyes: Negative for  visual disturbance.   Respiratory: Negative for cough and chest tightness.    Cardiovascular: Negative for chest pain  and leg swelling.   Gastrointestinal: Negative for nausea, diarrhea, constipation  Genitourinary: Negative.    Musculoskeletal: Negative.    Skin: Positive for abrasion on right forearm  Neurological: Positive for generalized weakness. Negative for dizziness light-headedness and headaches.   Psychiatric/Behavioral: Positive for chronic memory loss      Physical Exam:    /56 ; T 98 ; P72  Wt 160lb  GENERAL: Sitting in wheelchair, no acute distress. Occasionally falling asleep but awakens to voice   HEENT: pupils are equal, round and EOMI. Oral mucosa is moist.  RESP: Chest symmetric. Regular respiratory rate. No stridor, crackles or consolidations.  CVS: RRR without murmur  ABD: Nondistended, soft. Normoactive bowel sounds  EXTREMITIES: No lower extremity edema.   NEURO: non focal. Oriented to self only  PSYCH: smiling, good eye contact, no obvious hallucinations  SKIN: warm dry intact     Labs:    Recent Results (from the past 240 hour(s))   Basic Metabolic Panel   Result Value Ref Range    Sodium 137 136 - 145 mmol/L    Potassium 3.4 (L) 3.5 - 5.0 mmol/L    Chloride 103 98 - 107 mmol/L    CO2 24 22 - 31 mmol/L    Anion Gap, Calculation 10 5 - 18 mmol/L    Glucose 95 70 - 125 mg/dL    Calcium 9.2 8.5 - 10.5 mg/dL    BUN 32 (H) 8 - 28 mg/dL    Creatinine 1.00 0.70 - 1.30 mg/dL    GFR MDRD Af Amer >60 >60 mL/min/1.73m2    GFR MDRD Non Af Amer >60 >60 mL/min/1.73m2   HM1 (CBC with Diff)   Result Value Ref Range     WBC 12.5 (H) 4.0 - 11.0 thou/uL    RBC 4.53 4.40 - 6.20 mill/uL    Hemoglobin 14.5 14.0 - 18.0 g/dL    Hematocrit 42.9 40.0 - 54.0 %    MCV 95 80 - 100 fL    MCH 32.0 27.0 - 34.0 pg    MCHC 33.8 32.0 - 36.0 g/dL    RDW 12.1 11.0 - 14.5 %    Platelets 390 140 - 440 thou/uL    MPV 9.0 8.5 - 12.5 fL    Neutrophils % 69 50 - 70 %    Lymphocytes % 19 (L) 20 - 40 %    Monocytes % 10 2 - 10 %    Eosinophils % 2 0 - 6 %    Basophils % 1 0 - 2 %    Neutrophils Absolute 8.5 (H) 2.0 - 7.7 thou/uL    Lymphocytes Absolute 2.4 0.8 - 4.4 thou/uL    Monocytes Absolute 1.3 (H) 0.0 - 0.9 thou/uL    Eosinophils Absolute 0.2 0.0 - 0.4 thou/uL    Basophils Absolute 0.1 0.0 - 0.2 thou/uL             Assessment/Plan:   #1 advanced dementia.    #2 hypertension with hypotension noted in the TCU on a low-dose of losartan  #3  debilitation with gait instability  #4 hypokalemia ON supplementation  #5 WEAKNESS    Patient is awaiting placement in a memory care unit.  Cognitive scores remain quite impaired and he is pleasantly confused with no behaviors.  His current slums is 7/30.  He is ambulating about 25 feet currently.  On low-dose of losartan 25 mg with hold parameters continue to monitor trends  Will minimize labs and interventions due to the fact that patient has advanced dementia.    SOLOMON Marshall    This progress note was completed using Dragon software and there may be grammatical errors.

## 2021-06-19 NOTE — LETTER
Letter by Dinora Marin MBBS at      Author: Dinora Marin MBBS Service: -- Author Type: --    Filed:  Encounter Date: 2019 Status: (Other)         Patient: Wilian Vallejo   MR Number: [id number]   YOB: 1933   Date of Visit: 2019     [facility]      Code Status:  FULL CODE  Visit Type: Review Of Multiple Medical Conditions     Facility:  [facility] [[id number]]           History of Present Illness: Wilian Vallejo is a 85 y.o. male who is currently admitted to the TCU.  He has profound dementia due to which placement issues are being looked into.  He was being cared for his wife who recently .  Recheck slums is .  His mood and behaviors have been stable and he remains pleasantly confused  Blood pressures are reviewed.  He has been taken off losartan and blood pressures have been stable.  He did have some persistently low potassium due to which she remains on potassium supplementation.  Physical he is doing well though confused he is ambulating using a four-wheel walker denies any pain or discomfort      Past Medical History:   Diagnosis Date   ? BPH (benign prostatic hyperplasia)    ? Dementia    ? Essential hypertension    ? Inguinal hernia without obstruction or gangrene, recurrence not specified, unspecified laterality 2013    Left   ? Mild cognitive impairment with memory loss    ? S/P TURP    ? Weight loss      Past Surgical History:   Procedure Laterality Date   ? COLONOSCOPY     ? EYE SURGERY     ? Fulgurate bleeders      evacuate clots   ? Laproscopic herniorrhaphy inguinal Bilateral    ? TRANSURETHRAL RESECTION OF BLADDER      Cytoscopy TURP combined     Family History   Problem Relation Age of Onset   ? Heart disease Mother    ? Hypertension Mother    ? Heart disease Brother         Respiratory   ? Colon cancer Maternal Grandmother         colorectal     Social History     Socioeconomic History   ? Marital status: Patient  Declined     Spouse name: Not on file   ? Number of children: Not on file   ? Years of education: Not on file   ? Highest education level: Not on file   Occupational History   ? Not on file   Social Needs   ? Financial resource strain: Not on file   ? Food insecurity:     Worry: Not on file     Inability: Not on file   ? Transportation needs:     Medical: Not on file     Non-medical: Not on file   Tobacco Use   ? Smoking status: Never Smoker   ? Smokeless tobacco: Never Used   Substance and Sexual Activity   ? Alcohol use: Yes     Comment: 1-2 per week   ? Drug use: Not on file   ? Sexual activity: Not on file   Lifestyle   ? Physical activity:     Days per week: Not on file     Minutes per session: Not on file   ? Stress: Not on file   Relationships   ? Social connections:     Talks on phone: Not on file     Gets together: Not on file     Attends Jain service: Not on file     Active member of club or organization: Not on file     Attends meetings of clubs or organizations: Not on file     Relationship status: Not on file   ? Intimate partner violence:     Fear of current or ex partner: Not on file     Emotionally abused: Not on file     Physically abused: Not on file     Forced sexual activity: Not on file   Other Topics Concern   ? Incontinent Not Asked   ? Toileting independently Not Asked   ? Cognitive impairment Not Asked   ? Vision impairment Not Asked   ? Hearing impairment Not Asked   ? Dentures Not Asked   Social History Narrative   ? Not on file     Current Outpatient Medications   Medication Sig Dispense Refill   ? acetaminophen (TYLENOL) 325 MG tablet Take 650 mg by mouth every 6 (six) hours as needed for pain (Give 2 tablets by mouth every 6 hour as needed for pain).     ? memantine (NAMENDA) 5 MG tablet Take 5 mg by mouth 2 (two) times a day.     ? polyethylene glycol (MIRALAX) 17 gram packet Take 17 g by mouth daily as needed (Give 17 gram by mouth as needed daily for constipation. Give by mouth  or nasogastric tube).       No current facility-administered medications for this visit.      No Known Allergies      Review of Systems:    Constitutional: Positive for fatigue  Negative for fever, chills  HENT: Negative for congestion   Eyes: Negative for  visual disturbance.   Respiratory: Negative for cough and chest tightness.    Cardiovascular: Negative for chest pain  and leg swelling.   Gastrointestinal: Negative for nausea, diarrhea, constipation  Genitourinary: Negative.    Musculoskeletal: Negative.    Skin: Positive for abrasion on right forearm  Neurological: Positive for generalized weakness. Negative for dizziness light-headedness and headaches.   Psychiatric/Behavioral: Positive for chronic memory loss      Physical Exam:    /56 ; T 98 ; P72  Wt 160lb  GENERAL: Sitting in wheelchair, no acute distress. Occasionally falling asleep but awakens to voice   HEENT: pupils are equal, round and EOMI. Oral mucosa is moist.  RESP: Chest symmetric. Regular respiratory rate. No stridor, crackles or consolidations.  CVS: RRR without murmur  ABD: Nondistended, soft. Normoactive bowel sounds  EXTREMITIES: No lower extremity edema.   NEURO: non focal. Oriented to self only  PSYCH: smiling, good eye contact, no obvious hallucinations  SKIN: warm dry intact     Labs:    Recent Results (from the past 240 hour(s))   Basic Metabolic Panel   Result Value Ref Range    Sodium 140 136 - 145 mmol/L    Potassium 4.1 3.5 - 5.0 mmol/L    Chloride 106 98 - 107 mmol/L    CO2 25 22 - 31 mmol/L    Anion Gap, Calculation 9 5 - 18 mmol/L    Glucose 100 70 - 125 mg/dL    Calcium 9.2 8.5 - 10.5 mg/dL    BUN 20 8 - 28 mg/dL    Creatinine 1.00 0.70 - 1.30 mg/dL    GFR MDRD Af Amer >60 >60 mL/min/1.73m2    GFR MDRD Non Af Amer >60 >60 mL/min/1.73m2   Magnesium   Result Value Ref Range    Magnesium 1.9 1.8 - 2.6 mg/dL             Assessment/Plan:   #1 advanced dementia.    #2 hypertension with hypotension noted in the TCU on a low-dose  of losartan  #3  debilitation with gait instability  #4 hypokalemia ON supplementation  #5 WEAKNESS    Patient is awaiting placement in a memory care unit.  Cognitive scores remain quite impaired and he is pleasantly confused with no behaviors.  His current slums is 7/30.  He is ambulating about 25 feet currently.  On low-dose of losartan 25 mg with hold parameters continue to monitor trends  Will minimize labs and interventions due to the fact that patient has advanced dementia.    SOLOMON Marshall    This progress note was completed using Dragon software and there may be grammatical errors.

## 2021-06-19 NOTE — LETTER
Letter by Stewart Garcia NP at      Author: Stewart Garcia NP Service: -- Author Type: --    Filed:  Encounter Date: 5/31/2019 Status: (Other)         Patient: Wilian Vallejo   MR Number: [id number]   YOB: 1933   Date of Visit: 5/31/2019     [facility]      Facility:    [facility] [[id number]]  Code Status: POLST AVAILABLE      Chief Complaint/Reason for Visit:    Chief Complaint   Patient presents with   ? Review Of Multiple Medical Conditions       HPI:   Wilian is a 85 y.o. male who is a recent transfer from [facility] with an admission on 5/28/19 and discharge on 5/30/19. He has a PMH of dementia, htn, TURP, who has a wife that has been recently hospitalized at [facility] and his health deteriorated while she was away. He was admitted for weakness and lethargy. He received IVF and thiazide was held. He may need LTC if wife unable to care for him. He was subsequently transferred to the TCU for rehab and possible change of disposition.    Patient has no concerns today. He denies pain, headache, chest pain, numbness or tingling, shortest of breath, eating or swallowing concerns, nausea or vomiting, diarrhea or bowel abnormalities, or no new integumentary concerns today.      Past Medical History:  No past medical history on file.        Surgical History:  No past surgical history on file.    Family History:   No family history on file.    Social History:    Social History     Socioeconomic History   ? Marital status: Patient Declined     Spouse name: Not on file   ? Number of children: Not on file   ? Years of education: Not on file   ? Highest education level: Not on file   Occupational History   ? Not on file   Social Needs   ? Financial resource strain: Not on file   ? Food insecurity:     Worry: Not on file     Inability: Not on file   ? Transportation needs:     Medical: Not on file     Non-medical: Not on file   Tobacco Use   ?  Smoking status: Not on file   Substance and Sexual Activity   ? Alcohol use: Not on file   ? Drug use: Not on file   ? Sexual activity: Not on file   Lifestyle   ? Physical activity:     Days per week: Not on file     Minutes per session: Not on file   ? Stress: Not on file   Relationships   ? Social connections:     Talks on phone: Not on file     Gets together: Not on file     Attends Lutheran service: Not on file     Active member of club or organization: Not on file     Attends meetings of clubs or organizations: Not on file     Relationship status: Not on file   ? Intimate partner violence:     Fear of current or ex partner: Not on file     Emotionally abused: Not on file     Physically abused: Not on file     Forced sexual activity: Not on file   Other Topics Concern   ? Not on file   Social History Narrative   ? Not on file          Review of Systems   Constitutional: Positive for activity change. Negative for appetite change, chills, diaphoresis, fatigue and fever.        No issues   HENT: Negative for congestion and hearing loss.    Eyes: Negative.    Respiratory: Negative for shortness of breath and wheezing.    Cardiovascular: Negative.    Gastrointestinal: Negative.    Endocrine: Negative.    Genitourinary: Negative for difficulty urinating.   Musculoskeletal: Negative.    Skin:        Intact, bruised R arm   Allergic/Immunologic: Negative.    Neurological: Negative for dizziness, speech difficulty and light-headedness.   Hematological: Negative.    Psychiatric/Behavioral: Positive for confusion. Negative for agitation and sleep disturbance. The patient is not nervous/anxious and is not hyperactive.        Vitals:    05/31/19 1116   BP: 109/62   Pulse: 89   Resp: 18   Temp: 98  F (36.7  C)   SpO2: 97%   Weight: 160 lb (72.6 kg)       Physical Exam   Constitutional: No distress.   No acute issues   HENT:   Head: Normocephalic and atraumatic.   Mouth/Throat: Oropharynx is clear and moist. No oropharyngeal  exudate.   Eyes: Right eye exhibits no discharge. Left eye exhibits no discharge. No scleral icterus.   Neck: Normal range of motion. Neck supple. No JVD present.   Cardiovascular: Normal rate. Exam reveals no gallop and no friction rub.   No murmur heard.  Pulmonary/Chest: Effort normal. No stridor. No respiratory distress. He has no wheezes. He has no rales.   CTA, RA   Abdominal: Soft. Bowel sounds are normal. He exhibits no distension. There is no tenderness. There is no rebound.   Denies constipation or diarrhea   Genitourinary:   Genitourinary Comments: deferred   Musculoskeletal: He exhibits no edema, tenderness or deformity.   Denies pain, uses wc mainly   Neurological: He is alert. No cranial nerve deficit.   A/O x2   Skin: Skin is warm and dry. He is not diaphoretic.   Bruised R arm   Psychiatric: He has a normal mood and affect.   Denies anxiety or depression   Vitals reviewed.      Medication List:  Current Outpatient Medications   Medication Sig   ? acetaminophen (TYLENOL) 325 MG tablet Take 650 mg by mouth every 6 (six) hours as needed for pain (Give 2 tablets by mouth every 6 hour as needed for pain).   ? losartan (COZAAR) 50 MG tablet Take 50 mg by mouth daily. Give 1 tablet by mouth one time a day for HTN   ? memantine (NAMENDA) 5 MG tablet Take 5 mg by mouth 2 (two) times a day.   ? polyethylene glycol (MIRALAX) 17 gram packet Take 17 g by mouth daily as needed (Give 17 gram by mouth as needed daily for constipation. Give by mouth or nasogastric tube).       Labs:  Na 139  K 3.6  Mag 1.8  Cr 0.91  GFR >60  WBC 12.2  Hgb 13.6  MCV 93  TSH 0.60    Assessment/Plan:    Weakness and lethargy: resolved    Pain control: Continue Tylenol 650 every 6 hours as needed    Hypertension: Continue losartan 50 mg daily, monitor blood pressure twice daily x5 days    Dementia: Continue Namenda 5 mg twice daily    Hypokalemia: last K 3.6, replaced in hospital, recheck BMP    Constipation: Continue MiraLAX daily as  needed     Disposition: To be determined based on wife's capability, pending cognitive assessment    The care plan has been reviewed and all orders signed. Changes to care plan, if any, as noted. Otherwise, continue care plan of care.  The total time spent with this patient was 35 minutes, with greater than 50% spent in counseling and coordination of care that included multiple issues per discussion with therapy about potential progress, discussion with nursing staff about monitoring blood pressure and SW about living situation, which lasted 18 minutes.      Electronically signed by: Stewart Garcia NP

## 2021-06-19 NOTE — LETTER
Letter by Stewart Garcia NP at      Author: Stewart Garcia NP Service: -- Author Type: --    Filed:  Encounter Date: 7/9/2019 Status: (Other)         Patient: Wilian Vallejo   MR Number: [id number]   YOB: 1933   Date of Visit: 7/9/2019     [facility]      Facility:    [facility] [[id number]]  Code Status: POLST AVAILABLE      Chief Complaint/Reason for Visit:    Chief Complaint   Patient presents with   ? Review Of Multiple Medical Conditions       HPI:   Wilian is a 85 y.o. male who is a recent transfer from [facility] with an admission on 5/28/19 and discharge on 5/30/19. He has a PMH of dementia, htn, TURP, who has a [place] that [place] [facility] [place] health deteriorated while she was away. He was admitted for weakness and lethargy. He received IVF and thiazide was held. He may need LTC if wife unable to care for him. He was subsequently transferred to the TCU for rehab and possible change of disposition.      Past Medical History:  Past Medical History:   Diagnosis Date   ? BPH (benign prostatic hyperplasia)    ? Dementia    ? Essential hypertension    ? Inguinal hernia without obstruction or gangrene, recurrence not specified, unspecified laterality 04/02/2013    Left   ? Mild cognitive impairment with memory loss    ? S/P TURP    ? Weight loss            Surgical History:  Past Surgical History:   Procedure Laterality Date   ? COLONOSCOPY     ? EYE SURGERY     ? Fulgurate bleeders      evacuate clots   ? Laproscopic herniorrhaphy inguinal Bilateral    ? TRANSURETHRAL RESECTION OF BLADDER      Cytoscopy TURP combined       Family History:   Family History   Problem Relation Age of Onset   ? Heart disease Mother    ? Hypertension Mother    ? Heart disease Brother         Respiratory   ? Colon cancer Maternal Grandmother         colorectal       Social History:    Social History     Socioeconomic History   ?  Marital status: Patient Declined     Spouse name: Not on file   ? Number of children: Not on file   ? Years of education: Not on file   ? Highest education level: Not on file   Occupational History   ? Not on file   Social Needs   ? Financial resource strain: Not on file   ? Food insecurity:     Worry: Not on file     Inability: Not on file   ? Transportation needs:     Medical: Not on file     Non-medical: Not on file   Tobacco Use   ? Smoking status: Never Smoker   ? Smokeless tobacco: Never Used   Substance and Sexual Activity   ? Alcohol use: Yes     Comment: 1-2 per week   ? Drug use: Not on file   ? Sexual activity: Not on file   Lifestyle   ? Physical activity:     Days per week: Not on file     Minutes per session: Not on file   ? Stress: Not on file   Relationships   ? Social connections:     Talks on phone: Not on file     Gets together: Not on file     Attends Mandaeism service: Not on file     Active member of club or organization: Not on file     Attends meetings of clubs or organizations: Not on file     Relationship status: Not on file   ? Intimate partner violence:     Fear of current or ex partner: Not on file     Emotionally abused: Not on file     Physically abused: Not on file     Forced sexual activity: Not on file   Other Topics Concern   ? Incontinent Not Asked   ? Toileting independently Not Asked   ? Cognitive impairment Not Asked   ? Vision impairment Not Asked   ? Hearing impairment Not Asked   ? Dentures Not Asked   Social History Narrative   ? Not on file          Review of Systems   Constitutional: Negative for activity change, appetite change, chills, diaphoresis, fatigue and fever.        Some hypertension noticed   HENT: Negative for congestion and hearing loss.    Eyes: Negative.    Respiratory: Negative for shortness of breath and wheezing.    Cardiovascular: Negative.    Gastrointestinal: Negative.    Endocrine: Negative.    Genitourinary: Negative for difficulty urinating.    Musculoskeletal: Negative.    Skin:        Intact   Allergic/Immunologic: Negative.    Neurological: Negative for dizziness, speech difficulty and light-headedness.   Hematological: Negative.    Psychiatric/Behavioral: Positive for confusion. Negative for agitation and sleep disturbance. The patient is not nervous/anxious and is not hyperactive.        Vitals:    07/09/19 1526   BP: 161/90   Pulse: 75   Resp: 16   Temp: 98  F (36.7  C)   SpO2: 94%   Weight: 160 lb (72.6 kg)       Physical Exam   Constitutional: No distress.   More htn noticed, losartan restarted   HENT:   Head: Normocephalic and atraumatic.   Mouth/Throat: Oropharynx is clear and moist. No oropharyngeal exudate.   Eyes: Right eye exhibits no discharge. Left eye exhibits no discharge. No scleral icterus.   Neck: Normal range of motion. Neck supple. No JVD present.   Cardiovascular: Normal rate. Exam reveals no gallop and no friction rub.   No murmur heard.  Pulmonary/Chest: Effort normal. No stridor. No respiratory distress. He has no wheezes. He has no rales.   CTA, RA   Abdominal: Soft. Bowel sounds are normal. He exhibits no distension. There is no tenderness. There is no rebound.   Denies constipation or diarrhea   Genitourinary:   Genitourinary Comments: deferred   Musculoskeletal: He exhibits no edema, tenderness or deformity.   Denies pain, uses wc mainly   Neurological: He is alert. No cranial nerve deficit.   A/O x1-2   Skin: Skin is warm and dry. He is not diaphoretic.   Bruised R arm, improved   Psychiatric: He has a normal mood and affect.   Denies anxiety or depression   Vitals reviewed.      Medication List:  Current Outpatient Medications   Medication Sig   ? losartan (COZAAR) 50 MG tablet Take 50 mg by mouth daily. Hold for SBP <130 or DBP <85   ? acetaminophen (TYLENOL) 325 MG tablet Take 650 mg by mouth every 6 (six) hours as needed for pain (Give 2 tablets by mouth every 6 hour as needed for pain).   ? memantine (NAMENDA) 5 MG  tablet Take 5 mg by mouth 2 (two) times a day.   ? polyethylene glycol (MIRALAX) 17 gram packet Take 17 g by mouth daily as needed (Give 17 gram by mouth as needed daily for constipation. Give by mouth or nasogastric tube).       Labs:  Results for orders placed or performed in visit on 19   Basic Metabolic Panel   Result Value Ref Range    Sodium 139 136 - 145 mmol/L    Potassium 3.9 3.5 - 5.0 mmol/L    Chloride 106 98 - 107 mmol/L    CO2 26 22 - 31 mmol/L    Anion Gap, Calculation 7 5 - 18 mmol/L    Glucose 81 70 - 125 mg/dL    Calcium 9.1 8.5 - 10.5 mg/dL    BUN 14 8 - 28 mg/dL    Creatinine 0.91 0.70 - 1.30 mg/dL    GFR MDRD Af Amer >60 >60 mL/min/1.73m2    GFR MDRD Non Af Amer >60 >60 mL/min/1.73m2     Mag 1.9  WBC 12.2  Hgb 13.6  MCV 93  TSH 0.60    Assessment/Plan:    Weakness and lethargy: resolved    Pain control: Continue Tylenol 650 every 6 hours as needed    Hypertension: previously losartan weaned off, more htn evident. Now losartan restarted, losartan 50mg daily (hold SBP <130)    Dementia: Continue Namenda 5 mg twice daily    Hypokalemia: last K 3.9 7/3, continue potassium 10mEq daily    Constipation: Continue MiraLAX daily as needed     Disposition: Wife has , now family seeking placement at [facility] .       Electronically signed by: Stewart Garcia NP

## 2021-06-19 NOTE — LETTER
Letter by Stewart Garcia NP at      Author: Stewart Garcia NP Service: -- Author Type: --    Filed:  Encounter Date: 6/18/2019 Status: (Other)         Patient: Wilian Vallejo   MR Number: [id number]   YOB: 1933   Date of Visit: 6/18/2019     [facility]      Facility:    [facility] [[id number]]  Code Status: POLST AVAILABLE      Chief Complaint/Reason for Visit:    Chief Complaint   Patient presents with   ? Review Of Multiple Medical Conditions       HPI:   Wilian is a 85 y.o. male who is a recent transfer from [facility] with an admission on 5/28/19 and discharge on 5/30/19. He has a PMH of dementia, htn, TURP, who has a wife that has been recently hospitalized at [facility] [place] and his health deteriorated while she was away. He was admitted for weakness and lethargy. He received IVF and thiazide was held. He may need LTC if wife unable to care for him. He was subsequently transferred to the TCU for rehab and possible change of disposition.      Past Medical History:  Past Medical History:   Diagnosis Date   ? BPH (benign prostatic hyperplasia)    ? Dementia    ? Essential hypertension    ? Inguinal hernia without obstruction or gangrene, recurrence not specified, unspecified laterality 04/02/2013    Left   ? Mild cognitive impairment with memory loss    ? S/P TURP    ? Weight loss            Surgical History:  Past Surgical History:   Procedure Laterality Date   ? COLONOSCOPY     ? EYE SURGERY     ? Fulgurate bleeders      evacuate clots   ? Laproscopic herniorrhaphy inguinal Bilateral    ? TRANSURETHRAL RESECTION OF BLADDER      Cytoscopy TURP combined       Family History:   Family History   Problem Relation Age of Onset   ? Heart disease Mother    ? Hypertension Mother    ? Heart disease Brother         Respiratory   ? Colon cancer Maternal Grandmother         colorectal       Social History:    Social History     Socioeconomic History   ?  Marital status: Patient Declined     Spouse name: Not on file   ? Number of children: Not on file   ? Years of education: Not on file   ? Highest education level: Not on file   Occupational History   ? Not on file   Social Needs   ? Financial resource strain: Not on file   ? Food insecurity:     Worry: Not on file     Inability: Not on file   ? Transportation needs:     Medical: Not on file     Non-medical: Not on file   Tobacco Use   ? Smoking status: Never Smoker   ? Smokeless tobacco: Never Used   Substance and Sexual Activity   ? Alcohol use: Yes     Comment: 1-2 per week   ? Drug use: Not on file   ? Sexual activity: Not on file   Lifestyle   ? Physical activity:     Days per week: Not on file     Minutes per session: Not on file   ? Stress: Not on file   Relationships   ? Social connections:     Talks on phone: Not on file     Gets together: Not on file     Attends Buddhist service: Not on file     Active member of club or organization: Not on file     Attends meetings of clubs or organizations: Not on file     Relationship status: Not on file   ? Intimate partner violence:     Fear of current or ex partner: Not on file     Emotionally abused: Not on file     Physically abused: Not on file     Forced sexual activity: Not on file   Other Topics Concern   ? Incontinent Not Asked   ? Toileting independently Not Asked   ? Cognitive impairment Not Asked   ? Vision impairment Not Asked   ? Hearing impairment Not Asked   ? Dentures Not Asked   Social History Narrative   ? Not on file          Review of Systems   Constitutional: Negative for activity change, appetite change, chills, diaphoresis, fatigue and fever.        No issues   HENT: Negative for congestion and hearing loss.    Eyes: Negative.    Respiratory: Negative for shortness of breath and wheezing.    Cardiovascular: Negative.    Gastrointestinal: Negative.    Endocrine: Negative.    Genitourinary: Negative for difficulty urinating.   Musculoskeletal:  Negative.    Skin:        Intact   Allergic/Immunologic: Negative.    Neurological: Negative for dizziness, speech difficulty and light-headedness.   Hematological: Negative.    Psychiatric/Behavioral: Positive for confusion. Negative for agitation and sleep disturbance. The patient is not nervous/anxious and is not hyperactive.        Vitals:    06/18/19 1013   BP: 143/83   Pulse: 62   Resp: 18   Temp: 98  F (36.7  C)   SpO2: 95%   Weight: 156 lb (70.8 kg)       Physical Exam   Constitutional: No distress.   No acute issues   HENT:   Head: Normocephalic and atraumatic.   Mouth/Throat: Oropharynx is clear and moist. No oropharyngeal exudate.   Eyes: Right eye exhibits no discharge. Left eye exhibits no discharge. No scleral icterus.   Neck: Normal range of motion. Neck supple. No JVD present.   Cardiovascular: Normal rate. Exam reveals no gallop and no friction rub.   No murmur heard.  Pulmonary/Chest: Effort normal. No stridor. No respiratory distress. He has no wheezes. He has no rales.   CTA, RA   Abdominal: Soft. Bowel sounds are normal. He exhibits no distension. There is no tenderness. There is no rebound.   Denies constipation or diarrhea   Genitourinary:   Genitourinary Comments: deferred   Musculoskeletal: He exhibits no edema, tenderness or deformity.   Denies pain, uses wc mainly   Neurological: He is alert. No cranial nerve deficit.   A/O x1-2   Skin: Skin is warm and dry. He is not diaphoretic.   Bruised R arm, improved   Psychiatric: He has a normal mood and affect.   Denies anxiety or depression   Vitals reviewed.      Medication List:  Current Outpatient Medications   Medication Sig   ? acetaminophen (TYLENOL) 325 MG tablet Take 650 mg by mouth every 6 (six) hours as needed for pain (Give 2 tablets by mouth every 6 hour as needed for pain).   ? memantine (NAMENDA) 5 MG tablet Take 5 mg by mouth 2 (two) times a day.   ? polyethylene glycol (MIRALAX) 17 gram packet Take 17 g by mouth daily as needed  (Give 17 gram by mouth as needed daily for constipation. Give by mouth or nasogastric tube).       Labs:  Results for orders placed or performed in visit on 19   Basic Metabolic Panel   Result Value Ref Range    Sodium 140 136 - 145 mmol/L    Potassium 4.1 3.5 - 5.0 mmol/L    Chloride 106 98 - 107 mmol/L    CO2 25 22 - 31 mmol/L    Anion Gap, Calculation 9 5 - 18 mmol/L    Glucose 100 70 - 125 mg/dL    Calcium 9.2 8.5 - 10.5 mg/dL    BUN 20 8 - 28 mg/dL    Creatinine 1.00 0.70 - 1.30 mg/dL    GFR MDRD Af Amer >60 >60 mL/min/1.73m2    GFR MDRD Non Af Amer >60 >60 mL/min/1.73m2     Mag 1.9  WBC 12.2  Hgb 13.6  MCV 93  TSH 0.60    Assessment/Plan:    Weakness and lethargy: resolved    Pain control: Continue Tylenol 650 every 6 hours as needed    Hypertension: dc losartan, SBP <140    Dementia: Continue Namenda 5 mg twice daily    Hypokalemia: last K 4.1 on 19, continue potassium 10mEq daily, recheck BMP pending today    Constipation: Continue MiraLAX daily as needed     Disposition: Wife has , now family seeking placement at [facility] .       Electronically signed by: Stewart Garcia NP

## 2021-06-20 NOTE — LETTER
Letter by Dinora Marin MBBS at      Author: Dinora Marin MBBS Service: -- Author Type: --    Filed:  Encounter Date: 1/15/2020 Status: Signed         Patient: Wilian Vallejo   MR Number: [id number]   YOB: 1933   Date of Visit: 1/15/2020       [facility] Emergency Room Admission note      Patient: Wilian Vallejo  MRN: [id number]  Date of Service: 1/15/2020      [facility] [[id number]]  Reason for Visit     Chief Complaint   Patient presents with   ? Review Of Multiple Medical Conditions       Code Status     FULL CODE    Assessment     Acute episode of unresponsiveness for which patient was lowered to the floor.  911 called he was sent to the emergency room work-up reviewed and he has been discharged back in a stable condition  Dementia currently on [email or web address] 7/[facility]  History of hypertension now on a low-dose of losartan 25 mg daily  Significant lymphedema bilateral lower extremities  History of hypokalemia on potassium supplementation.  Last BMP on 7/11/2019 with a potassium of 4.2  BPH  Debilitation generalized weakness    Plan     Patient currently doing well and has been stable in long-term care.  Unfortunately he has no recall of recent events what was sent to the emergency room last night because of an unresponsive episode.  He was lowered to the floor.  CPR was attempted and then subsequently was sent to [facility].  His family was contacted who requested that no aggressive interventions be done as he is DNR/DNI however CODE STATUS in the nursing home indicates that he is full code.  Work-up in the emergency room reviewed and mostly normal so has been discharged back in a stable condition.   requested to review CODE STATUS again with family in light of his progressive dementia.  His son had indicated that he was DNR/DNI but patient had requested full code in the [facility] 7/30.  Family is working on alternative placement assisted living facility.  Blood  pressures have been stable on a low-dose of losartan 25 mg daily with hold parameters.  Potassium on recheck has been stable.  He is on potassium supplementation.  Insomnia concerns have resolved.  Patient overall is doing well in long-term care but declining slowly cognitively  Lab and EKG done in the emergency room on yesterday night were reviewed and were normal no further labs needed at this time    History     Patient is a very pleasant 86 y.o. male who is a resident of [facility]  Patient had acute episode of syncope last night.  He was lowered to the floor subsequently he was sent to the emergency room he had extensive work-up EKG was reassuring lab work was stable and he is discharged back he has no recall of the events.  He has underlying history of dementia and remains pleasantly confused his last slums was 7/30.  He is alert and oriented to himself and has no recall of recent events.  Overall mood has been stable .  He has some behaviors per staff.  In the last 1 month he has had a couple of elopement episodes when he has been found in the elevator.  He had a fall on 9/15/2019 when he was found in the bathroom floor.  He has no recall of those events  He has a history of hypertension and is now on a low-dose of losartan at 25 mg.  He is tolerating the medication well.  Labs did show a low vitamin D level so he has been started on supplementation.  He also has a history of hypokalemia and is on potassium supplementation recheck potassiums have been stable to    Past Medical History     Active Ambulatory (Non-Hospital) Problems    Diagnosis   ? Adjustment insomnia   ? Weakness   ? Alzheimer's dementia without behavioral disturbance (H)   ? Essential hypertension   ? Slow transit constipation   ? Hypokalemia     Past Medical History:   Diagnosis Date   ? BPH (benign prostatic hyperplasia)    ? Dementia    ? Essential hypertension    ? Inguinal hernia without obstruction or gangrene, recurrence not specified,  unspecified laterality 04/02/2013   ? Mild cognitive impairment with memory loss    ? S/P TURP    ? Weight loss        Past Social History     Reviewed, and he  reports that he has never smoked. He has never used smokeless tobacco. He reports current alcohol use.    Family History     Reviewed, and family history includes Colon cancer in his maternal grandmother; Heart disease in his brother and mother; Hypertension in his mother.    Medication List     Current Outpatient Medications on File Prior to Visit   Medication Sig Dispense Refill   ? acetaminophen (TYLENOL) 325 MG tablet Take 650 mg by mouth every 6 (six) hours as needed for pain (Give 2 tablets by mouth every 6 hour as needed for pain).     ? losartan (COZAAR) 50 MG tablet Take 25 mg by mouth daily. Hold for SBP <130 or DBP <85            ? memantine (NAMENDA) 5 MG tablet Take 5 mg by mouth 2 (two) times a day.     ? polyethylene glycol (MIRALAX) 17 gram packet Take 17 g by mouth daily as needed (Give 17 gram by mouth as needed daily for constipation. Give by mouth or nasogastric tube).     ? potassium chloride (KLOR-CON) 10 MEQ CR tablet Take 10 mEq by mouth daily.       No current facility-administered medications on file prior to visit.        Allergies     No Known Allergies    Review of Systems   A comprehensive review of 14 systems was done. Pertinent findings noted here and in history of present illness. All the rest negative.  Constitutional: Negative.  Negative for fever, chills, he has activity change, appetite change and fatigue.   HENT: Negative for congestion and facial swelling.    Eyes: Negative for photophobia, redness and visual disturbance.   Respiratory: Negative for cough and chest tightness.    Cardiovascular: Negative for chest pain, palpitations and has significant leg swelling.   Gastrointestinal: Negative for nausea, diarrhea, constipation, blood in stool and abdominal distention.   Genitourinary: Negative.    Musculoskeletal:  Negative.  Has difficulty walking does use a walker  Skin: Negative.    Neurological: Negative for dizziness, tremors, syncope, weakness, light-headedness and headaches.   Hematological: Does not bruise/bleed easily.   Psychiatric/Behavioral: Negative.  Fused having significant lymphedema      Physical Exam     Recent Vitals 12/20/2019   Weight 153 lbs   /84   Pulse 72   Temp 98   SpO2 96   Some recent data might be hidden       Constitutional: Oriented to person, and appears well-developed.   HEENT:  Normocephalic and atraumatic.  Eyes: Conjunctivae and EOM are normal. Pupils are equal, round, and reactive to light. No discharge.  No scleral icterus. Nose normal. Mouth/Throat: Oropharynx is clear and moist. No oropharyngeal exudate.    NECK: Normal range of motion. Neck supple. No JVD present. No tracheal deviation present. No thyromegaly present.   CARDIOVASCULAR: Normal rate, regular rhythm and intact distal pulses.  Exam reveals no gallop and no friction rub.  Systolic murmur present.  PULMONARY: Effort normal and breath sounds normal. No respiratory distress.No Wheezing or rales.  ABDOMEN: Soft. Bowel sounds are normal. No distension and no mass.  There is no tenderness. There is no rebound and no guarding. No HSM.  MUSCULOSKELETAL: Normal range of motion. TRACE leg edema and no tenderness. Mild kyphosis, no tenderness.  LYMPH NODES: Has no cervical, supraclavicular, axillary and groin adenopathy.   NEUROLOGICAL: Alert and oriented to person, . No cranial nerve deficit.  Normal muscle tone. Coordination normal.   GENITOURINARY: Deferred exam.  SKIN: Skin is warm and dry. No rash noted. No erythema. No pallor.   EXTREMITIES: No cyanosis, no clubbing, TRACE leg edema. No Deformity.  PSYCHIATRIC: Normal mood, affect and behavior.  Recall is impaired      Lab Results       Lab Results   Component Value Date    BUN 20 10/28/2019    CALCIUM 8.8 10/28/2019     10/28/2019    CO2 28 10/28/2019    CREATININE  1.00 10/28/2019    GFRAA >60 10/28/2019    GFRNONAA >60 10/28/2019    GLU 63 (L) 10/28/2019    HCT 42.1 08/14/2019    WBC 9.6 08/14/2019    HGB 13.9 (L) 08/14/2019    MG 1.9 06/11/2019     08/14/2019    K 4.2 10/28/2019     10/28/2019     Last vitamin D level was 9.5  Recheck labs in the ER was stable with a creatinine of 1 stable electrolytes  EKG did show sinus bradycardia otherwise and was normal    SOLOMON Marshall

## 2021-06-20 NOTE — LETTER
Letter by Dinora Marin MBBS at      Author: Dinora Marin MBBS Service: -- Author Type: --    Filed:  Encounter Date: 7/21/2020 Status: (Other)         Patient: Wilian Vallejo   MR Number: [id number]   YOB: 1933   Date of Visit: 7/21/2020       [facility] Admission note      Patient: Wilian Vallejo  MRN: [id number]  Date of Service: 7/21/2020      [facility] [[id number]]  Reason for Visit     Chief Complaint   Patient presents with   ? Review Of Multiple Medical Conditions   Follow-up HIP #    Code Status     DNR/DNI    Assessment     -Acute fall in the nursing home on 6/27/2020; 2 additional falls noted over the weekend   no injuries patient has no recall  -Acute nondisplaced right subcapital hip fracture.  -Pain management  Dementia currently on [facility] 7/30  History of hypertension now on a low-dose of losartan 25 mg daily; discontinued due to low blood pressures  Hypokalemia on supplementation  BPH  Debilitation generalized weakness    Plan     Patient examined using a video encounter.  Informed consent taken prior to the encounter.  Location of patient is [facility].  Location of MD is medical care for [facility].  Start time 8:55 AM.  End time is 9:10 AM  Patient has currently been in the TCU participating in rehab related to his hip fracture  He is being followed by orthopedics and currently family is elected to pursue conservative treatment no surgical intervention and pain management.  He is resting comfortably and denies any pain.  Unfortunately he remains a very poor historian due to dementia and could not tell me is having severe pain  Nursing will  monitor him for mood behaviors and further falls if he has another fall plan will be to check some labs including a UA UC.  Monitor intake  He has had recently 15 pound weight loss because of declining intake and is being monitored closely.  Mood and behaviors have been stable  Care plan  reviewed with nursing as patient has no insight into his falls or his skin bruise      History     Patient is a very pleasant 86 y.o. male who is a resident of [facility]  Patient unfortunately has advanced dementia due to which she remains a poor historian at best he is alert to only himself.  Recall remains quite limited    He had a fall subsequently was diagnosed with nondisplaced subcapital hip fracture on 6/27/2020.  Family declined hospitalization.  He was in the nursing home and now has been moved to the TCU for aggressive rehab.  Progress is limited due to profound cognitive impairment and baseline debilitation  He has had 2 falls recently over the weekend.  No injuries have been reported he is noted to have a significant ecchymoses of his right forearm again he could not tell me what was going on at all staff had limited insight but did report that he has had no injuries  Oral intake has been variable with progressive weight loss reported  Weights are currently down to 136 pounds    Past Medical History     Active Ambulatory (Non-Hospital) Problems    Diagnosis   ? 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 Pain   ? Adjustment insomnia   ? Weakness   ? Alzheimer's dementia without behavioral disturbance (H)   ? Essential hypertension   ? Slow transit constipation   ? Hypokalemia     Past Medical History:   Diagnosis Date   ? BPH (benign prostatic hyperplasia)    ? Dementia    ? Essential hypertension    ? Inguinal hernia without obstruction or gangrene, recurrence not specified, unspecified laterality 04/02/2013   ? Mild cognitive impairment with memory loss    ? S/P TURP    ? Weight loss        Past Social History     Reviewed, and he  reports that he has never smoked. He has never used smokeless tobacco. He reports current alcohol use.    Family History     Reviewed, and family history includes Colon cancer in his maternal grandmother; Heart disease in his brother and mother; Hypertension in his  mother.    Medication List     Current Outpatient Medications on File Prior to Visit   Medication Sig Dispense Refill   ? acetaminophen (TYLENOL) 500 MG tablet Take 1,000 mg by mouth 4 (four) times a day.     ? cholecalciferol, vitamin D3, 5,000 unit Tab Take 1 tablet by mouth daily.     ? memantine (NAMENDA) 5 MG tablet Take 5 mg by mouth 2 (two) times a day.     ? polyethylene glycol (MIRALAX) 17 gram packet Take 17 g by mouth daily as needed (Give 17 gram by mouth as needed daily for constipation. Give by mouth or nasogastric tube).     ? potassium chloride (KLOR-CON) 10 MEQ CR tablet Take 10 mEq by mouth daily.     ? QUEtiapine (SEROQUEL) 25 MG tablet Take 12.5 mg by mouth 2 (two) times a day. Give at 0800 and 1600     ? traMADoL (ULTRAM) 50 mg tablet Take 1 tablet (50 mg total) by mouth 3 (three) times a day. 30 tablet 2     No current facility-administered medications on file prior to visit.    Losartan has been discontinued prior to discharge from the hospital    Allergies     No Known Allergies    Review of Systems   A comprehensive review of 14 systems was done. Pertinent findings noted here and in history of present illness. All the rest negative.  Constitutional: Negative.  Negative for fever, chills, he has activity change, appetite change and fatigue.   HENT: Negative for congestion and facial swelling.    Eyes: Negative for photophobia, redness and visual disturbance.   Respiratory: Negative for cough and chest tightness.    Cardiovascular: Negative for chest pain, palpitations and has NO significant leg swelling.   Gastrointestinal: Negative for nausea, diarrhea, constipation, blood in stool and abdominal distention.   Genitourinary: Negative.    Musculoskeletal: Negative.  Has difficulty walking does use a walker  Skin: Negative.    Neurological: Negative for dizziness, tremors, syncope, weakness, light-headedness and headaches.   Hematological: Does not bruise/bleed easily.   Psychiatric/Behavioral:  Negative.  CONFused with limited recall      Physical Exam     Recent Vitals 7/16/2020   Height -   Weight 136 lbs   BSA (m2) 1.75 m2   /90   Pulse 90   Temp 97   SpO2 95   Some recent data might be hidden   Recheck weight 135 pounds blood pressure is 112/68 temp 98 pulse 80  GENERAL: no acute distress. Cooperative in conversation.   HEENT: pupils are equal, round and reactive. Oral mucosa is moist and intact.  RESP:Chest symmetric. Regular respiratory rate. No stridor.  ABD: Nondistended, soft.  EXTREMITIES: No lower extremity edema.   NEURO: non focal. Alert and oriented X SELF.   PSYCH: within normal limits. No depression or anxiety.  SKIN: warm dry intact , ecchymosis with skin tear of the right forearm noted         Lab Results     Results for orders placed or performed in visit on 06/29/20   Basic Metabolic Panel   Result Value Ref Range    Sodium 137 136 - 145 mmol/L    Potassium 3.7 3.5 - 5.0 mmol/L    Chloride 105 98 - 107 mmol/L    CO2 23 22 - 31 mmol/L    Anion Gap, Calculation 9 5 - 18 mmol/L    Glucose 136 (H) 70 - 125 mg/dL    Calcium 8.1 (L) 8.5 - 10.5 mg/dL    BUN 23 8 - 28 mg/dL    Creatinine 1.06 0.70 - 1.30 mg/dL    GFR MDRD Af Amer >60 >60 mL/min/1.73m2    GFR MDRD Non Af Amer >60 >60 mL/min/1.73m2       Recheck x-ray shows a nondisplaced right subcapital femur fracture with no displacement  SOLOMON Marshall

## 2021-06-20 NOTE — LETTER
Letter by Stewart Garcia NP at      Author: Stewart Garcia NP Service: -- Author Type: --    Filed:  Encounter Date: 7/22/2020 Status: (Other)         Patient: Wilian Vallejo   MR Number: [id number]   YOB: 1933   Date of Visit: 7/22/2020     [facility]      Facility:    [facility] [[id number]]  Code Status: DNR      Chief Complaint/Reason for Visit:    Chief Complaint   Patient presents with   ? Review Of Multiple Medical Conditions     pain       HPI:   Wilian is a 86 y.o. male who initially was a transfer from [facility] with an admission on 5/28/19 and discharge on 5/30/19. He has a PMH of dementia, htn, TURP, who has a wife that has been recently hospitalized at [facility] and his health deteriorated while she was away. He was admitted for weakness and lethargy. He received IVF and thiazide was held. He may need LTC if wife unable to care for him. He was subsequently transferred to the TCU for rehab and possible change of disposition.    TCU:  Today will assess vitals, pain control and therapy progress.    LTC:   He finished his TCU stay and now resides in LTC as of 6/2019 as his wife passed away. He is wc bound with no behaviors reported. He has limited concerns.  His htn is well controlled. Had some recent falls w/o injury, though on 6/27/20 he suffered a R hip fx.  Family was consulted and they decided to keep him in the facility with the intent for luiza Galan to guide tx.      Past Medical History:  Past Medical History:   Diagnosis Date   ? BPH (benign prostatic hyperplasia)    ? Dementia    ? Essential hypertension    ? Inguinal hernia without obstruction or gangrene, recurrence not specified, unspecified laterality 04/02/2013    Left   ? Mild cognitive impairment with memory loss    ? S/P TURP    ? Weight loss            Surgical History:  Past Surgical History:   Procedure Laterality Date   ? COLONOSCOPY     ? EYE  SURGERY     ? Fulgurate bleeders      evacuate clots   ? Laproscopic herniorrhaphy inguinal Bilateral    ? TRANSURETHRAL RESECTION OF BLADDER      Cytoscopy TURP combined       Family History:   Family History   Problem Relation Age of Onset   ? Heart disease Mother    ? Hypertension Mother    ? Heart disease Brother         Respiratory   ? Colon cancer Maternal Grandmother         colorectal       Social History:    Social History     Socioeconomic History   ? Marital status:      Spouse name: Not on file   ? Number of children: Not on file   ? Years of education: Not on file   ? Highest education level: Not on file   Occupational History   ? Not on file   Social Needs   ? Financial resource strain: Not on file   ? Food insecurity     Worry: Not on file     Inability: Not on file   ? Transportation needs     Medical: Not on file     Non-medical: Not on file   Tobacco Use   ? Smoking status: Never Smoker   ? Smokeless tobacco: Never Used   Substance and Sexual Activity   ? Alcohol use: Yes     Comment: 1-2 per week   ? Drug use: Not on file   ? Sexual activity: Not on file   Lifestyle   ? Physical activity     Days per week: Not on file     Minutes per session: Not on file   ? Stress: Not on file   Relationships   ? Social connections     Talks on phone: Not on file     Gets together: Not on file     Attends [employer] service: Not on file     Active member of club or organization: Not on file     Attends meetings of clubs or organizations: Not on file     Relationship status: Not on file   ? Intimate partner violence     Fear of current or ex partner: Not on file     Emotionally abused: Not on file     Physically abused: Not on file     Forced sexual activity: Not on file   Other Topics Concern   ? Incontinent Not Asked   ? Toileting independently Not Asked   ? Cognitive impairment Not Asked   ? Vision impairment Not Asked   ? Hearing impairment Not Asked   ? Dentures Not Asked   Social History Narrative   ?  "Not on file          Review of Systems   Constitutional: Positive for activity change and fatigue. Negative for appetite change, chills, diaphoresis and fever.        No concerns   HENT: Negative for congestion and hearing loss.    Eyes: Negative.    Respiratory: Negative for shortness of breath and wheezing.    Cardiovascular: Negative.    Gastrointestinal: Negative.    Endocrine: Negative.    Genitourinary: Negative for difficulty urinating.   Musculoskeletal:        R hip pain controlled   Skin:        Intact   Allergic/Immunologic: Negative.    Neurological: Negative for dizziness, speech difficulty and light-headedness.   Hematological: Negative.    Psychiatric/Behavioral: Positive for confusion. Negative for agitation and sleep disturbance. The patient is not nervous/anxious and is not hyperactive.         Baseline       Vitals:    07/22/20 1315   BP: 138/80   Pulse: 70   Resp: 16   Temp: 97  F (36.1  C)   SpO2: 96%   Weight: 136 lb (61.7 kg)   Height: 5' 10\" (1.778 m)       Physical Exam   Constitutional: No distress.   R hip pain, ortho PA to guide tx   HENT:   Head: Normocephalic and atraumatic.   Mouth/Throat: Oropharynx is clear and moist. No oropharyngeal exudate.   Eyes: Right eye exhibits no discharge. Left eye exhibits no discharge. No scleral icterus.   Neck: Normal range of motion. Neck supple. No JVD present.   Cardiovascular: Normal rate. Exam reveals no gallop and no friction rub.   No murmur heard.  Pulmonary/Chest: Effort normal. No stridor. No respiratory distress.   RA   Abdominal: He exhibits no distension.   No constipation or diarrhea reported   Genitourinary:    Genitourinary Comments: incontinent     Musculoskeletal:         General: No tenderness, deformity or edema.      Comments: R hip pain mostly controlled, WBAT, working with therapy   Neurological: He is alert. No cranial nerve deficit.   A/O x1   Skin: Skin is warm and dry. He is not diaphoretic.   intact   Psychiatric: He has a " normal mood and affect.   No behaviors reported   Vitals reviewed.      Medication List:  Current Outpatient Medications   Medication Sig   ? lidocaine 4 % patch Place 1 patch on the skin daily. Remove and discard patch with 12 hours. Apply to R hip   ? acetaminophen (TYLENOL) 500 MG tablet Take 1,000 mg by mouth 4 (four) times a day.   ? cholecalciferol, vitamin D3, 5,000 unit Tab Take 1 tablet by mouth daily.   ? memantine (NAMENDA) 5 MG tablet Take 5 mg by mouth 2 (two) times a day.   ? polyethylene glycol (MIRALAX) 17 gram packet Take 17 g by mouth daily as needed (Give 17 gram by mouth as needed daily for constipation. Give by mouth or nasogastric tube).   ? potassium chloride (KLOR-CON) 10 MEQ CR tablet Take 10 mEq by mouth daily.   ? QUEtiapine (SEROQUEL) 25 MG tablet Take 12.5 mg by mouth 2 (two) times a day. Give at 0800 and 1600   ? traMADoL (ULTRAM) 50 mg tablet Take 1 tablet (50 mg total) by mouth 3 (three) times a day.       Labs:  Results for orders placed or performed in visit on 06/29/20   Basic Metabolic Panel   Result Value Ref Range    Sodium 137 136 - 145 mmol/L    Potassium 3.7 3.5 - 5.0 mmol/L    Chloride 105 98 - 107 mmol/L    CO2 23 22 - 31 mmol/L    Anion Gap, Calculation 9 5 - 18 mmol/L    Glucose 136 (H) 70 - 125 mg/dL    Calcium 8.1 (L) 8.5 - 10.5 mg/dL    BUN 23 8 - 28 mg/dL    Creatinine 1.06 0.70 - 1.30 mg/dL    GFR MDRD Af Amer >60 >60 mL/min/1.73m2    GFR MDRD Non Af Amer >60 >60 mL/min/1.73m2     Lab Results   Component Value Date    WBC 16.4 (H) 06/29/2020    HGB 14.2 06/29/2020    HCT 41.4 06/29/2020    MCV 94 06/29/2020     06/29/2020     Lab Results   Component Value Date    TSH 0.87 08/14/2019     Vitamin B-12   Date Value Ref Range Status   08/14/2019 463 213 - 816 pg/mL Final     Vitamin D, Total (25-Hydroxy)   Date Value Ref Range Status   08/14/2019 9.5 (L) 30.0 - 80.0 ng/mL Final     Mag 1.9      Assessment/Plan:    New R nondisplaced subcapital hip fx suffered on  20: family wanted him kept in facility, so Lorin GEORGE from  has guided tx. Now WBAT and working with therapy    Pain control: continue tylenol 1000mg four times a day and increase tramadol 50mg to four times a day and lidocaine patch/gel    Hypertension: previously losartan weaned off, more htn evident. Per monitoring, SBP <150    Dementia: Continue Namenda 5 mg twice daily and continue seroquel 12.5mg two times a day, effective    New leukocytosis: Last white count 16.4 on , possibly due to fracture, UA/UC negative, probably d/t fx    Hypokalemia: last K 3.7 on , continue potassium 10mEq daily.     Vit D def: continue D3 5000U daily    Normocytic anemia: last Hgb 14.2 on     Insomnia: melatonin discontinued, no issues reported    Constipation: Continue MiraLAX daily as needed     Disposition: Wife has , now family was seeking placement at [facility] though now resides in LT. [facility] .       Electronically signed by: Stewart Garcia NP

## 2021-06-20 NOTE — LETTER
Letter by Stewart Garcia NP at      Author: Stewart Garcia NP Service: -- Author Type: --    Filed:  Encounter Date: 12/20/2019 Status: Signed         Patient: Wilian Vallejo   MR Number: [id number]   YOB: 1933   Date of Visit: 12/20/2019     [facility]      Facility[facility]    [facility] [[id number]]  Code Status: POLST AVAILABLE      Chief Complaint/Reason for Visit:    Chief Complaint   Patient presents with   ? Review Of Multiple Medical Conditions       HPI:   Wilian is a 86 y.o. male who initially was a transfer from [facility] with an admission on 5/28/19 and discharge on 5/30/19. He has a PMH of dementia, htn, TURP, who has a wife that has been recently hospitalized at [facility] and his health deteriorated while she was away. He was admitted for weakness and lethargy. He received IVF and thiazide was held. He may need LTC if wife unable to care for him. He was subsequently transferred to the TCU for rehab and possible change of disposition.    LTC:   He finished his TCU stay and now resides in LTC as of 6/2019 as his wife passed away. He is wc bound with no behaviors reported. He has limited concerns.  His htn is well controlled. No need for medication changes this visit.       Past Medical History:  Past Medical History:   Diagnosis Date   ? BPH (benign prostatic hyperplasia)    ? Dementia    ? Essential hypertension    ? Inguinal hernia without obstruction or gangrene, recurrence not specified, unspecified laterality 04/02/2013    Left   ? Mild cognitive impairment with memory loss    ? S/P TURP    ? Weight loss            Surgical History:  Past Surgical History:   Procedure Laterality Date   ? COLONOSCOPY     ? EYE SURGERY     ? Fulgurate bleeders      evacuate clots   ? Laproscopic herniorrhaphy inguinal Bilateral    ? TRANSURETHRAL RESECTION OF BLADDER      Cytoscopy TURP combined       Family History:   Family History   Problem  Relation Age of Onset   ? Heart disease Mother    ? Hypertension Mother    ? Heart disease Brother         Respiratory   ? Colon cancer Maternal Grandmother         colorectal       Social History:    Social History     Socioeconomic History   ? Marital status: Patient Declined     Spouse name: Not on file   ? Number of children: Not on file   ? Years of education: Not on file   ? Highest education level: Not on file   Occupational History   ? Not on file   Social Needs   ? Financial resource strain: Not on file   ? Food insecurity:     Worry: Not on file     Inability: Not on file   ? Transportation needs:     Medical: Not on file     Non-medical: Not on file   Tobacco Use   ? Smoking status: Never Smoker   ? Smokeless tobacco: Never Used   Substance and Sexual Activity   ? Alcohol use: Yes     Comment: 1-2 per week   ? Drug use: Not on file   ? Sexual activity: Not on file   Lifestyle   ? Physical activity:     Days per week: Not on file     Minutes per session: Not on file   ? Stress: Not on file   Relationships   ? Social connections:     Talks on phone: Not on file     Gets together: Not on file     Attends Anabaptism service: Not on file     Active member of club or organization: Not on file     Attends meetings of clubs or organizations: Not on file     Relationship status: Not on file   ? Intimate partner violence:     Fear of current or ex partner: Not on file     Emotionally abused: Not on file     Physically abused: Not on file     Forced sexual activity: Not on file   Other Topics Concern   ? Incontinent Not Asked   ? Toileting independently Not Asked   ? Cognitive impairment Not Asked   ? Vision impairment Not Asked   ? Hearing impairment Not Asked   ? Dentures Not Asked   Social History Narrative   ? Not on file          Review of Systems   Constitutional: Negative for activity change, appetite change, chills, diaphoresis, fatigue and fever.        No concerns   HENT: Negative for congestion and hearing  loss.    Eyes: Negative.    Respiratory: Negative for shortness of breath and wheezing.    Cardiovascular: Negative.    Gastrointestinal: Negative.    Endocrine: Negative.    Genitourinary: Negative for difficulty urinating.   Musculoskeletal: Negative.         Denies pain   Skin:        Intact   Allergic/Immunologic: Negative.    Neurological: Negative for dizziness, speech difficulty and light-headedness.   Hematological: Negative.    Psychiatric/Behavioral: Positive for confusion. Negative for agitation and sleep disturbance. The patient is not nervous/anxious and is not hyperactive.         Baseline       Vitals:    12/20/19 1813   BP: 132/84   Pulse: 72   Resp: 16   Temp: 98  F (36.7  C)   SpO2: 96%   Weight: 153 lb (69.4 kg)       Physical Exam   Constitutional: No distress.   No acute issues   HENT:   Head: Normocephalic and atraumatic.   Mouth/Throat: Oropharynx is clear and moist. No oropharyngeal exudate.   Eyes: Right eye exhibits no discharge. Left eye exhibits no discharge. No scleral icterus.   Neck: Normal range of motion. Neck supple. No JVD present.   Cardiovascular: Normal rate. Exam reveals no gallop and no friction rub.   No murmur heard.  Pulmonary/Chest: Effort normal. No stridor. No respiratory distress. He has no wheezes. He has no rales.   Dim, RA   Abdominal: Soft. Bowel sounds are normal. He exhibits no distension. There is no abdominal tenderness. There is no rebound.   Denies constipation or diarrhea   Genitourinary:    Genitourinary Comments: deferred     Musculoskeletal:         General: No tenderness, deformity or edema.      Comments: Denies pain, wc bound. Recently his walking program has been discontinued   Neurological: He is alert. No cranial nerve deficit.   A/O x1   Skin: Skin is warm and dry. He is not diaphoretic.   intact   Psychiatric: He has a normal mood and affect.   Denies anxiety or depression   Vitals reviewed.      Medication List:  Current Outpatient Medications    Medication Sig   ? acetaminophen (TYLENOL) 325 MG tablet Take 650 mg by mouth every 6 (six) hours as needed for pain (Give 2 tablets by mouth every 6 hour as needed for pain).   ? losartan (COZAAR) 50 MG tablet Take 25 mg by mouth daily. Hold for SBP <130 or DBP <85          ? memantine (NAMENDA) 5 MG tablet Take 5 mg by mouth 2 (two) times a day.   ? polyethylene glycol (MIRALAX) 17 gram packet Take 17 g by mouth daily as needed (Give 17 gram by mouth as needed daily for constipation. Give by mouth or nasogastric tube).   ? potassium chloride (KLOR-CON) 10 MEQ CR tablet Take 10 mEq by mouth daily.       Labs:  Results for orders placed or performed in visit on 10/28/19   Basic Metabolic Panel   Result Value Ref Range    Sodium 137 136 - 145 mmol/L    Potassium 4.2 3.5 - 5.0 mmol/L    Chloride 103 98 - 107 mmol/L    CO2 28 22 - 31 mmol/L    Anion Gap, Calculation 6 5 - 18 mmol/L    Glucose 63 (L) 70 - 125 mg/dL    Calcium 8.8 8.5 - 10.5 mg/dL    BUN 20 8 - 28 mg/dL    Creatinine 1.00 0.70 - 1.30 mg/dL    GFR MDRD Af Amer >60 >60 mL/min/1.73m2    GFR MDRD Non Af Amer >60 >60 mL/min/1.73m2     Lab Results   Component Value Date    WBC 9.6 08/14/2019    HGB 13.9 (L) 08/14/2019    HCT 42.1 08/14/2019    MCV 96 08/14/2019     08/14/2019     Lab Results   Component Value Date    TSH 0.87 08/14/2019     Vitamin B-12   Date Value Ref Range Status   08/14/2019 463 213 - 816 pg/mL Final     Vitamin D, Total (25-Hydroxy)   Date Value Ref Range Status   08/14/2019 9.5 (L) 30.0 - 80.0 ng/mL Final     Mag 1.9      Assessment/Plan:    Weakness and lethargy: resolved    Pain control: Continue Tylenol 650 every 6 hours as needed, denies pain    Hypertension: previously losartan weaned off, more htn evident. Now losartan restarted,  And currently losartan 25mg daily. SBP <150    Dementia: Continue Namenda 5 mg twice daily, no behaviors reported    Hypokalemia: last K 34.2 on 10/28/19, continue potassium 10mEq daily    Vit D  def: continue D3 5000U daily    Insomnia: melatonin discontinued    Constipation: Continue MiraLAX daily as needed     Disposition: Wife has , now family was seeking placement at [facility] though now resides in [facility] .       Electronically signed by: Stewart Garica NP

## 2021-06-20 NOTE — LETTER
Letter by Stewart Garcia NP at      Author: Stewart Garcia NP Service: -- Author Type: --    Filed:  Encounter Date: 2/28/2020 Status: (Other)         Patient: Wilian Vallejo   MR Number: [id number]   YOB: 1933   Date of Visit: 2/28/2020     [facility]      Facility:    [facility] [[id number]]  Code Status: POLST AVAILABLE      Chief Complaint/Reason for Visit:    Chief Complaint   Patient presents with   ? Review Of Multiple Medical Conditions       HPI:   Wilian is a 86 y.o. male who initially was a transfer from [facility] with an admission on 5/28/19 and discharge on 5/30/19. He has a PMH of dementia, htn, TURP, who has a wife that has been recently hospitalized at [facility] and his health deteriorated while she was away. He was admitted for weakness and lethargy. He received IVF and thiazide was held. He may need LTC if wife unable to care for him. He was subsequently transferred to the TCU for rehab and possible change of disposition.    LTC:   He finished his TCU stay and now resides in LTC as of 6/2019 as his wife passed away. He is wc bound with no behaviors reported. He has limited concerns.  His htn is well controlled. Had some recent falls w/o injury. No other changes needed today.      Past Medical History:  Past Medical History:   Diagnosis Date   ? BPH (benign prostatic hyperplasia)    ? Dementia    ? Essential hypertension    ? Inguinal hernia without obstruction or gangrene, recurrence not specified, unspecified laterality 04/02/2013    Left   ? Mild cognitive impairment with memory loss    ? S/P TURP    ? Weight loss            Surgical History:  Past Surgical History:   Procedure Laterality Date   ? COLONOSCOPY     ? EYE SURGERY     ? Fulgurate bleeders      evacuate clots   ? Laproscopic herniorrhaphy inguinal Bilateral    ? TRANSURETHRAL RESECTION OF BLADDER      Cytoscopy TURP combined       Family History:   Family  History   Problem Relation Age of Onset   ? Heart disease Mother    ? Hypertension Mother    ? Heart disease Brother         Respiratory   ? Colon cancer Maternal Grandmother         colorectal       Social History:    Social History     Socioeconomic History   ? Marital status: Patient Declined     Spouse name: Not on file   ? Number of children: Not on file   ? Years of education: Not on file   ? Highest education level: Not on file   Occupational History   ? Not on file   Social Needs   ? Financial resource strain: Not on file   ? Food insecurity:     Worry: Not on file     Inability: Not on file   ? Transportation needs:     Medical: Not on file     Non-medical: Not on file   Tobacco Use   ? Smoking status: Never Smoker   ? Smokeless tobacco: Never Used   Substance and Sexual Activity   ? Alcohol use: Yes     Comment: 1-2 per week   ? Drug use: Not on file   ? Sexual activity: Not on file   Lifestyle   ? Physical activity:     Days per week: Not on file     Minutes per session: Not on file   ? Stress: Not on file   Relationships   ? Social connections:     Talks on phone: Not on file     Gets together: Not on file     Attends Orthodox service: Not on file     Active member of club or organization: Not on file     Attends meetings of clubs or organizations: Not on file     Relationship status: Not on file   ? Intimate partner violence:     Fear of current or ex partner: Not on file     Emotionally abused: Not on file     Physically abused: Not on file     Forced sexual activity: Not on file   Other Topics Concern   ? Incontinent Not Asked   ? Toileting independently Not Asked   ? Cognitive impairment Not Asked   ? Vision impairment Not Asked   ? Hearing impairment Not Asked   ? Dentures Not Asked   Social History Narrative   ? Not on file          Review of Systems   Constitutional: Negative for activity change, appetite change, chills, diaphoresis, fatigue and fever.        No concerns   HENT: Negative for  "congestion and hearing loss.    Eyes: Negative.    Respiratory: Negative for shortness of breath and wheezing.    Cardiovascular: Negative.    Gastrointestinal: Negative.    Endocrine: Negative.    Genitourinary: Negative for difficulty urinating.   Musculoskeletal: Negative.         Denies pain   Skin:        Intact   Allergic/Immunologic: Negative.    Neurological: Negative for dizziness, speech difficulty and light-headedness.   Hematological: Negative.    Psychiatric/Behavioral: Positive for confusion. Negative for agitation and sleep disturbance. The patient is not nervous/anxious and is not hyperactive.         Baseline       Vitals:    02/28/20 0949   BP: 147/86   Pulse: 65   Resp: 18   Temp: 98  F (36.7  C)   SpO2: 98%   Weight: 148 lb (67.1 kg)   Height: 5' 10\" (1.778 m)       Physical Exam   Constitutional: No distress.   No acute issues   HENT:   Head: Normocephalic and atraumatic.   Mouth/Throat: Oropharynx is clear and moist. No oropharyngeal exudate.   Eyes: Right eye exhibits no discharge. Left eye exhibits no discharge. No scleral icterus.   Neck: Normal range of motion. Neck supple. No JVD present.   Cardiovascular: Normal rate. Exam reveals no gallop and no friction rub.   No murmur heard.  Pulmonary/Chest: Effort normal. No stridor. No respiratory distress. He has no wheezes. He has no rales.   Dim, RA   Abdominal: Soft. Bowel sounds are normal. He exhibits no distension. There is no abdominal tenderness. There is no rebound.   Denies constipation or diarrhea   Genitourinary:    Genitourinary Comments: deferred     Musculoskeletal:         General: No tenderness, deformity or edema.      Comments: Denies pain, wc bound. Recently his walking program has been discontinued   Neurological: He is alert. No cranial nerve deficit.   A/O x1   Skin: Skin is warm and dry. He is not diaphoretic.   intact   Psychiatric: He has a normal mood and affect.   No behaviors reported   Vitals " reviewed.      Medication List:  Current Outpatient Medications   Medication Sig   ? cholecalciferol, vitamin D3, 5,000 unit Tab Take 1 tablet by mouth daily.   ? acetaminophen (TYLENOL) 325 MG tablet Take 650 mg by mouth every 6 (six) hours as needed for pain (Give 2 tablets by mouth every 6 hour as needed for pain).   ? losartan (COZAAR) 50 MG tablet Take 25 mg by mouth daily. Hold for SBP <130 or DBP <85          ? memantine (NAMENDA) 5 MG tablet Take 5 mg by mouth 2 (two) times a day.   ? polyethylene glycol (MIRALAX) 17 gram packet Take 17 g by mouth daily as needed (Give 17 gram by mouth as needed daily for constipation. Give by mouth or nasogastric tube).   ? potassium chloride (KLOR-CON) 10 MEQ CR tablet Take 10 mEq by mouth daily.       Labs:  Results for orders placed or performed in visit on 10/28/19   Basic Metabolic Panel   Result Value Ref Range    Sodium 137 136 - 145 mmol/L    Potassium 4.2 3.5 - 5.0 mmol/L    Chloride 103 98 - 107 mmol/L    CO2 28 22 - 31 mmol/L    Anion Gap, Calculation 6 5 - 18 mmol/L    Glucose 63 (L) 70 - 125 mg/dL    Calcium 8.8 8.5 - 10.5 mg/dL    BUN 20 8 - 28 mg/dL    Creatinine 1.00 0.70 - 1.30 mg/dL    GFR MDRD Af Amer >60 >60 mL/min/1.73m2    GFR MDRD Non Af Amer >60 >60 mL/min/1.73m2     Lab Results   Component Value Date    WBC 9.6 08/14/2019    HGB 13.9 (L) 08/14/2019    HCT 42.1 08/14/2019    MCV 96 08/14/2019     08/14/2019     Lab Results   Component Value Date    TSH 0.87 08/14/2019     Vitamin B-12   Date Value Ref Range Status   08/14/2019 463 213 - 816 pg/mL Final     Vitamin D, Total (25-Hydroxy)   Date Value Ref Range Status   08/14/2019 9.5 (L) 30.0 - 80.0 ng/mL Final     Mag 1.9      Assessment/Plan:    Weakness and lethargy: resolved    Pain control: Continue Tylenol 650 every 6 hours as needed, denies pain    Hypertension: previously losartan weaned off, more htn evident. Now losartan restarted,  And currently losartan 25mg daily. SBP  <150    Dementia: Continue Namenda 5 mg twice daily, no behaviors reported    Hypokalemia: last K 34.2 on 10/28/19, continue potassium 10mEq daily    Vit D def: started on D3 5000U daily    Insomnia: melatonin discontinued, no issues reported    Constipation: Continue MiraLAX daily as needed     Disposition: Wife has , now family was seeking placement at [facility] though now resides in [facility] .       Electronically signed by: Stewart Garcia NP

## 2021-06-20 NOTE — LETTER
Letter by Stewart Garcai NP at      Author: Stewart Garcia NP Service: -- Author Type: --    Filed:  Encounter Date: 7/28/2020 Status: (Other)         Patient: Wilian Vallejo   MR Number: [id number]   YOB: 1933   Date of Visit: 7/28/2020     [facility]      Facility:    [facility] [[id number]]  Code Status: DNR      Chief Complaint/Reason for Visit:    Chief Complaint   Patient presents with   ? Problem Visit     pain       HPI:   Wilian is a 86 y.o. male who initially was a transfer from [facility] with an admission on 5/28/19 and discharge on 5/30/19. He has a PMH of dementia, htn, TURP, who has a wife that has been recently hospitalized at [facility] and his health deteriorated while she was away. He was admitted for weakness and lethargy. He received IVF and thiazide was held. He may need LTC if wife unable to care for him. He was subsequently transferred to the TCU for rehab and possible change of disposition.    LTC:   He finished his TCU stay and now resides in LTC as of 6/2019 as his wife passed away. He is wc bound with no behaviors reported. He has limited concerns.  His htn is well controlled. Had some recent falls w/o injury, though on 6/27/20 he suffered a R hip fx.  Family was consulted and they decided to keep him in the facility with the intent for luiza Galan to guide tx. Recently had another fall and not being able to ambulate with more pain reported.      Past Medical History:  Past Medical History:   Diagnosis Date   ? BPH (benign prostatic hyperplasia)    ? Dementia    ? Essential hypertension    ? Inguinal hernia without obstruction or gangrene, recurrence not specified, unspecified laterality 04/02/2013    Left   ? Mild cognitive impairment with memory loss    ? S/P TURP    ? Weight loss            Surgical History:  Past Surgical History:   Procedure Laterality Date   ? COLONOSCOPY     ? EYE SURGERY     ?  Fulgurate bleeders      evacuate clots   ? Laproscopic herniorrhaphy inguinal Bilateral    ? TRANSURETHRAL RESECTION OF BLADDER      Cytoscopy TURP combined       Family History:   Family History   Problem Relation Age of Onset   ? Heart disease Mother    ? Hypertension Mother    ? Heart disease Brother         Respiratory   ? Colon cancer Maternal Grandmother         colorectal       Social History:    Social History     Socioeconomic History   ? Marital status:      Spouse name: Not on file   ? Number of children: Not on file   ? Years of education: Not on file   ? Highest education level: Not on file   Occupational History   ? Not on file   Social Needs   ? Financial resource strain: Not on file   ? Food insecurity     Worry: Not on file     Inability: Not on file   ? Transportation needs     Medical: Not on file     Non-medical: Not on file   Tobacco Use   ? Smoking status: Never Smoker   ? Smokeless tobacco: Never Used   Substance and Sexual Activity   ? Alcohol use: Yes     Comment: 1-2 per week   ? Drug use: Not on file   ? Sexual activity: Not on file   Lifestyle   ? Physical activity     Days per week: Not on file     Minutes per session: Not on file   ? Stress: Not on file   Relationships   ? Social connections     Talks on phone: Not on file     Gets together: Not on file     Attends Episcopal service: Not on file     Active member of club or organization: Not on file     Attends meetings of clubs or organizations: Not on file     Relationship status: Not on file   ? Intimate partner violence     Fear of current or ex partner: Not on file     Emotionally abused: Not on file     Physically abused: Not on file     Forced sexual activity: Not on file   Other Topics Concern   ? Incontinent Not Asked   ? Toileting independently Not Asked   ? Cognitive impairment Not Asked   ? Vision impairment Not Asked   ? Hearing impairment Not Asked   ? Dentures Not Asked   Social History Narrative   ? Not on file           Review of Systems   Constitutional: Positive for activity change and fatigue. Negative for appetite change, chills, diaphoresis and fever.        More pain per another fall   HENT: Negative for congestion and hearing loss.    Eyes: Negative.    Respiratory: Negative for shortness of breath and wheezing.    Cardiovascular: Negative.    Gastrointestinal: Negative.    Endocrine: Negative.    Genitourinary: Negative for difficulty urinating.   Musculoskeletal:        More R hip pain, recent fall   Skin:        Intact   Allergic/Immunologic: Negative.    Neurological: Negative for dizziness, speech difficulty and light-headedness.   Hematological: Negative.    Psychiatric/Behavioral: Positive for confusion. Negative for agitation and sleep disturbance. The patient is nervous/anxious. The patient is not hyperactive.         Baseline       Vitals:    07/28/20 1412   BP: (!) 163/100   Pulse: (!) 59   Resp: 16   Temp: 97  F (36.1  C)   SpO2: 96%   Weight: 136 lb (61.7 kg)       Physical Exam   Constitutional: No distress.   More R hip pain per recent fall   HENT:   Head: Normocephalic and atraumatic.   Mouth/Throat: Oropharynx is clear and moist. No oropharyngeal exudate.   Eyes: Right eye exhibits no discharge. Left eye exhibits no discharge. No scleral icterus.   Neck: Normal range of motion. Neck supple. No JVD present.   Cardiovascular: Normal rate. Exam reveals no gallop and no friction rub.   No murmur heard.  Pulmonary/Chest: Effort normal. No stridor. No respiratory distress.   RA   Abdominal: He exhibits no distension.   No constipation or diarrhea reported   Genitourinary:    Genitourinary Comments: incontinent     Musculoskeletal:         General: No tenderness, deformity or edema.      Comments: Another fall, has more pain associated with R hip   Neurological: He is alert. No cranial nerve deficit.   A/O x1   Skin: Skin is warm and dry. He is not diaphoretic.   intact   Psychiatric: He has a normal mood  and affect.   No behaviors reported   Vitals reviewed.      Medication List:  Current Outpatient Medications   Medication Sig   ? oxyCODONE (ROXICODONE) 5 MG immediate release tablet Take 5 mg by mouth 3 (three) times a day. And 5mg q4h PRN   ? acetaminophen (TYLENOL) 500 MG tablet Take 1,000 mg by mouth 4 (four) times a day.   ? cholecalciferol, vitamin D3, 5,000 unit Tab Take 1 tablet by mouth daily.   ? lidocaine 4 % patch Place 1 patch on the skin daily. Remove and discard patch with 12 hours. Apply to R hip   ? memantine (NAMENDA) 5 MG tablet Take 5 mg by mouth 2 (two) times a day.   ? polyethylene glycol (MIRALAX) 17 gram packet Take 17 g by mouth daily as needed (Give 17 gram by mouth as needed daily for constipation. Give by mouth or nasogastric tube).   ? potassium chloride (KLOR-CON) 10 MEQ CR tablet Take 10 mEq by mouth daily.   ? QUEtiapine (SEROQUEL) 25 MG tablet Take 12.5 mg by mouth 2 (two) times a day. Give at 0800 and 1600       Labs:  Results for orders placed or performed in visit on 06/29/20   Basic Metabolic Panel   Result Value Ref Range    Sodium 137 136 - 145 mmol/L    Potassium 3.7 3.5 - 5.0 mmol/L    Chloride 105 98 - 107 mmol/L    CO2 23 22 - 31 mmol/L    Anion Gap, Calculation 9 5 - 18 mmol/L    Glucose 136 (H) 70 - 125 mg/dL    Calcium 8.1 (L) 8.5 - 10.5 mg/dL    BUN 23 8 - 28 mg/dL    Creatinine 1.06 0.70 - 1.30 mg/dL    GFR MDRD Af Amer >60 >60 mL/min/1.73m2    GFR MDRD Non Af Amer >60 >60 mL/min/1.73m2     Lab Results   Component Value Date    WBC 16.4 (H) 06/29/2020    HGB 14.2 06/29/2020    HCT 41.4 06/29/2020    MCV 94 06/29/2020     06/29/2020     Lab Results   Component Value Date    TSH 0.87 08/14/2019     Vitamin B-12   Date Value Ref Range Status   08/14/2019 463 213 - 816 pg/mL Final     Vitamin D, Total (25-Hydroxy)   Date Value Ref Range Status   08/14/2019 9.5 (L) 30.0 - 80.0 ng/mL Final     Mag 1.9      Assessment/Plan:    New R nondisplaced subcapital hip fx  suffered on 20: family wanted him kept in facility, so Lorin GEORGE from  has guided tx. He was ambulating with therapy though fell several days ago, since unable to weight bear    Pain control: continue tylenol 1000mg four times a day and lidocaine patch/gel. DC tramadol and start oxycodone 5mg three times a day with 5mg q4h PRN    Hypertension: previously losartan weaned off, more htn evident. Per monitoring, SBP <150    Dementia: Continue Namenda 5 mg twice daily and continue seroquel 12.5mg two times a day, effective    New leukocytosis: Last white count 16.4 on , possibly due to fracture, UA/UC negative, probably d/t fx    Hypokalemia: last K 3.7 on , continue potassium 10mEq daily.     Vit D def: continue D3 5000U daily    Normocytic anemia: last Hgb 14.2 on     Insomnia: melatonin discontinued, no issues reported    Constipation: Continue MiraLAX daily as needed     Disposition: Wife has , now family was seeking placement at [facility] though now resides in LT. [facility] .       Electronically signed by: Stewart Garcia NP

## 2021-06-20 NOTE — LETTER
"Letter by Stewart Garcia NP at      Author: Stewart Garcia NP Service: -- Author Type: --    Filed:  Encounter Date: 5/29/2020 Status: (Other)         Patient: Wilian Vallejo   MR Number: [id number]   YOB: 1933   Date of Visit: 5/29/2020     [facility]   Video Visit    Code Status: DNR    Wilian Vallejo is a 86 y.o. male who is being evaluated via a billable video visit.      The patient has been notified of following:     \"This video visit will be conducted via a call between you and your physician/provider. We have found that certain health care needs can be provided without the need for an in-person physical exam.  This service lets us provide the care you need with a video conversation.  If a prescription is necessary we can send it to the facility team.  If lab work is needed we can place an order through the facility team to have that test done at a later time.    If during the course of the call the physician/provider feels a video visit is not appropriate, you will not be charged for this service.\"     Physician/provider has received verbal consent for a Video Visit from the patient? Yes    Patient would like the video invitation sent by: Send to : NA    Video Start Time: 0830    Chief Complaint/Reason for Visit:  Chief Complaint   Patient presents with   ? Review Of Multiple Medical Conditions     dementia, Htn       HPI:   Wilian is a 86 y.o. male who initially was a transfer from [facility] with an admission on 5/28/19 and discharge on 5/30/19. He has a PMH of dementia, htn, TURP, who has a wife that has been recently hospitalized at [facility] and his health deteriorated while she was away. He was admitted for weakness and lethargy. He received IVF and thiazide was held. He may need LTC if wife unable to care for him. He was subsequently transferred to the TCU for rehab and possible change of disposition.     LTC:   He finished his TCU stay and now " "resides in LTC as of 6/2019 as his wife passed away. He is wc bound with no behaviors reported. He has limited concerns.  He has a hx of falls w/o injury. Recently losartan was discontinued per hypotension. No other changes needed today.    I have reviewed and updated the patient's Past Medical History, Social History, Family History and Medication List.    ALLERGIES  Patient has no known allergies.    Review of Systems    Vitals:    05/29/20 0858   BP: (!) 175/91   Pulse: 67   Resp: 17   Temp: 98  F (36.7  C)   SpO2: 98%   Weight: 150 lb (68 kg)   Height: 5' 10\" (1.778 m)         Labs:  Results for orders placed or performed in visit on 10/28/19   Basic Metabolic Panel   Result Value Ref Range    Sodium 137 136 - 145 mmol/L    Potassium 4.2 3.5 - 5.0 mmol/L    Chloride 103 98 - 107 mmol/L    CO2 28 22 - 31 mmol/L    Anion Gap, Calculation 6 5 - 18 mmol/L    Glucose 63 (L) 70 - 125 mg/dL    Calcium 8.8 8.5 - 10.5 mg/dL    BUN 20 8 - 28 mg/dL    Creatinine 1.00 0.70 - 1.30 mg/dL    GFR MDRD Af Amer >60 >60 mL/min/1.73m2    GFR MDRD Non Af Amer >60 >60 mL/min/1.73m2     Lab Results   Component Value Date    WBC 9.6 08/14/2019    HGB 13.9 (L) 08/14/2019    HCT 42.1 08/14/2019    MCV 96 08/14/2019     08/14/2019     Additional provider notes:    Assessment/Plan:    Weakness and lethargy: resolved     Pain control: Continue Tylenol 650 every 6 hours as needed, denies pain     Hypertension: previously losartan weaned off, more htn evident. Losartan was restarted and now discontinued per hypotension. SBP <160 in evenings       Dementia: Continue Namenda 5 mg twice daily, no behaviors reported     Hypokalemia: last K 34.2 on 10/28/19, continue potassium 10mEq daily     Vit D def: restarted on D3 5000U daily     Insomnia: melatonin discontinued, no issues reported     Constipation: Continue MiraLAX daily as needed      Disposition: He resides in LT. [facility] 7/30.       Video-Visit Details    Type of service:  Video " Visit    Video End Time (time video stopped): 0842    Originating Location (pt. Location):[facility] [[id number]]    Distant Location (provider location):  [facility]     Mode of Communication:  FaceTime Video Conference      Stewart Garcia NP

## 2021-06-20 NOTE — LETTER
Letter by Dinora Marin MBBS at      Author: Dinora Marin MBBS Service: -- Author Type: --    Filed:  Encounter Date: 7/14/2020 Status: (Other)         Patient: Wilian Vallejo   MR Number: [id number]   YOB: 1933   Date of Visit: 7/14/2020       [facility] Admission note      Patient: Wilian Vallejo  MRN: [id number]  Date of Service: 7/14/2020      [facility] [[id number]]  Reason for Visit     Chief Complaint   Patient presents with   ? Review Of Multiple Medical Conditions   Follow-up HIP #    Code Status     DNR/DNI    Assessment     -Acute fall in the nursing home on 6/27/2020  -Acute nondisplaced right subcapital hip fracture.  -Pain management  Dementia currently on Namenda SLUMS 7/30  History of hypertension now on a low-dose of losartan 25 mg daily; discontinued due to low blood pressures  Hypokalemia on supplementation  BPH  Debilitation generalized weakness    Plan     [facility] a resident of [facility] currently where he has been shifted from nursing home to pursue aggressive therapy.  He recently was diagnosed with a right nondisplaced of capital hip fracture on 6/27/2020.  Family has elected to keep him in the TCU.  Recheck x-ray has been done today which shows that this is stable with no further displacement.  He will have a follow-up visit with orthopedics on 7/16/2020 to discuss further treatment goals.  Family is hoping no surgeries I did talk to his daughter on face time in his presence who agrees with the plan and does not want to pursue aggressive intervention   he is a DNR.  They are also hoping not to rehospitalize him if possible.  Since patient could not tell me anything about his pain management I did discuss this with daughter she feels he is comfortable.  He is on scheduled Tylenol tramadol.  Weight loss was reviewed he has had progressive weight loss now down   pounds which is almost a 15 pound weight loss suspect this is because of  progressive cognitive impairment.  Nursing has been supplementing his meals with supplements and he will also need cognitive assistance  His daughter present on face time during the visit was updated regarding follow-up and x-ray results also follow-up regarding orthopedics was discussed and pain management.    Subsequently I had to call his health insurance to discuss need for continued therapy.  He has been denied therapy by his healthcare insurance because of the fact that he had significant needs and did not benefit from it however his prior level of functioning was reviewed with them  He was independent using a walker everywhere now he is requiring 2 person assist with all his transfers at this is a significant decline in mood benefit from therapy  They have agreed to let him have some coverage  Total time spent is 35 minutes with more than 25 minutes spent face-to-face in the presence of patient discussing care plan and also with his health insurance  This is all outlined in my note above        History     Patient is a very pleasant 86 y.o. male who is a resident of [facility]  Patient unfortunately has advanced dementia due to which she remains a poor historian at best he is alert to only himself.  He was examined in the presence of his daughter who was doing a face time with him and care consent was reviewed.  He had a fall subsequently was diagnosed with nondisplaced subcapital hip fracture on 6/27/2020.  Family declined hospitalization.  He was in the nursing home and now has been moved to the TCU for aggressive rehab.  He was seen because staff wanted me to contact his insurance to ensure that he could get adequate coverage so his therapy could resume.  He remains a poor historian however he was face timing his daughter and I did review his pain management and care concerns with them they are happy that he is able to stay in the nursing home and can participate in therapy they are happy with his current pain  management as long as he is resting comfortably.  He has had significant decline recently with loss in weight and decreasing appetite increasing confusion  He has had a significant decline weights and now down 135 pounds from 150 earlier    Past Medical History     Active Ambulatory (Non-Hospital) Problems    Diagnosis   ? 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 Pain   ? Adjustment insomnia   ? Weakness   ? Alzheimer's dementia without behavioral disturbance (H)   ? Essential hypertension   ? Slow transit constipation   ? Hypokalemia     Past Medical History:   Diagnosis Date   ? BPH (benign prostatic hyperplasia)    ? Dementia    ? Essential hypertension    ? Inguinal hernia without obstruction or gangrene, recurrence not specified, unspecified laterality 04/02/2013   ? Mild cognitive impairment with memory loss    ? S/P TURP    ? Weight loss        Past Social History     Reviewed, and he  reports that he has never smoked. He has never used smokeless tobacco. He reports current alcohol use.    Family History     Reviewed, and family history includes Colon cancer in his maternal grandmother; Heart disease in his brother and mother; Hypertension in his mother.    Medication List     Current Outpatient Medications on File Prior to Visit   Medication Sig Dispense Refill   ? acetaminophen (TYLENOL) 500 MG tablet Take 1,000 mg by mouth 4 (four) times a day.     ? cholecalciferol, vitamin D3, 5,000 unit Tab Take 1 tablet by mouth daily.     ? memantine (NAMENDA) 5 MG tablet Take 5 mg by mouth 2 (two) times a day.     ? polyethylene glycol (MIRALAX) 17 gram packet Take 17 g by mouth daily as needed (Give 17 gram by mouth as needed daily for constipation. Give by mouth or nasogastric tube).     ? potassium chloride (KLOR-CON) 10 MEQ CR tablet Take 10 mEq by mouth daily.     ? QUEtiapine (SEROQUEL) 25 MG tablet Take 12.5 mg by mouth 2 (two) times a day. Give at 0800 and 1600     ? traMADoL (ULTRAM) 50 mg tablet Take  "0.5 tablets (25 mg total) by mouth 3 (three) times a day. 30 tablet 2     No current facility-administered medications on file prior to visit.    Losartan has been discontinued prior to discharge from the hospital    Allergies     No Known Allergies    Review of Systems   A comprehensive review of 14 systems was done. Pertinent findings noted here and in history of present illness. All the rest negative.  Constitutional: Negative.  Negative for fever, chills, he has activity change, appetite change and fatigue.   HENT: Negative for congestion and facial swelling.    Eyes: Negative for photophobia, redness and visual disturbance.   Respiratory: Negative for cough and chest tightness.    Cardiovascular: Negative for chest pain, palpitations and has NO significant leg swelling.   Gastrointestinal: Negative for nausea, diarrhea, constipation, blood in stool and abdominal distention.   Genitourinary: Negative.    Musculoskeletal: Negative.  Has difficulty walking does use a walker  Skin: Negative.    Neurological: Negative for dizziness, tremors, syncope, weakness, light-headedness and headaches.   Hematological: Does not bruise/bleed easily.   Psychiatric/Behavioral: Negative.  CONFused with limited recall      Physical Exam     Recent Vitals 6/29/2020   Height 5' 10\"   Weight 146 lbs   BSA (m2) 1.81 m2   /60   Pulse 90   Temp 97   SpO2 96   Some recent data might be hidden   Recheck weight 135 pounds blood pressure is 112/68 temp 98 pulse 80    Constitutional: Oriented to person, and appears well-developed.   Frail confused and weak  HEENT:  Normocephalic and atraumatic.  Eyes: Conjunctivae and EOM are normal. Pupils are equal, round, and reactive to light. No discharge.  No scleral icterus. Nose normal. Mouth/Throat: Oropharynx is clear and moist. No oropharyngeal exudate.    NECK: Normal range of motion. Neck supple. No JVD present. No tracheal deviation present. No thyromegaly present.   CARDIOVASCULAR: Normal " rate, regular rhythm and intact distal pulses.  Exam reveals noJVD  PULMONARY: Effort normal and breath sounds normal. No respiratory distress.  ABDOMEN: Soft. Bowel sounds are normal. No distension and no mass.  There is no tenderness. There is no rebound and no guarding. No HSM.  MUSCULOSKELETAL: Normal range of motion. TRACE leg edema and no tenderness. Mild kyphosis, no tenderness.  Does not follow commands and move his legs at all.  He is wheelchair-bound  LYMPH NODES: Has no cervical, supraclavicular, axillary and groin adenopathy.   NEUROLOGICAL: Alert and oriented to person, . No cranial nerve deficit.  Normal muscle tone. Coordination normal.   GENITOURINARY: Deferred exam.  SKIN: Skin is warm and dry. No rash noted. No erythema. No pallor.   EXTREMITIES: No cyanosis, no clubbing, TRACE leg edema. No Deformity.  PSYCHIATRIC: Normal mood, affect and behavior.  Recall is impaired  Speech is nonsensical    Lab Results     Results for orders placed or performed in visit on 06/29/20   Basic Metabolic Panel   Result Value Ref Range    Sodium 137 136 - 145 mmol/L    Potassium 3.7 3.5 - 5.0 mmol/L    Chloride 105 98 - 107 mmol/L    CO2 23 22 - 31 mmol/L    Anion Gap, Calculation 9 5 - 18 mmol/L    Glucose 136 (H) 70 - 125 mg/dL    Calcium 8.1 (L) 8.5 - 10.5 mg/dL    BUN 23 8 - 28 mg/dL    Creatinine 1.06 0.70 - 1.30 mg/dL    GFR MDRD Af Amer >60 >60 mL/min/1.73m2    GFR MDRD Non Af Amer >60 >60 mL/min/1.73m2       Recheck x-ray shows a nondisplaced right subcapital femur fracture with no displacement  SOLOMON Marshall

## 2021-06-20 NOTE — LETTER
Letter by Stewart Garcia NP at      Author: Stewart Garcia NP Service: -- Author Type: --    Filed:  Encounter Date: 7/16/2020 Status: (Other)         Patient: Wilian Vallejo   MR Number: [id number]   YOB: 1933   Date of Visit: 7/16/2020     [facility]      Facility:    [facility] [[id number]]  Code Status: DNR      Chief Complaint/Reason for Visit:    Chief Complaint   Patient presents with   ? Review Of Multiple Medical Conditions       HPI:   Wilian is a 86 y.o. male who initially was a transfer from [facility] with an admission on 5/28/19 and discharge on 5/30/19. He has a PMH of dementia, htn, TURP, who has a wife that has been recently hospitalized at [facility] and his health deteriorated while she was away. He was admitted for weakness and lethargy. He received IVF and thiazide was held. He may need LTC if wife unable to care for him. He was subsequently transferred to the TCU for rehab and possible change of disposition.    TCU:  Now per therapy need after R hip fx, continued on     LTC:   He finished his TCU stay and now resides in LTC as of 6/2019 as his wife passed away. He is wc bound with no behaviors reported. He has limited concerns.  His htn is well controlled. Had some recent falls w/o injury, though on 6/27/20 he suffered a R hip fx.  Family was consulted and they decided to keep him in the facility with the intent for luiza Galan to guide tx.      Past Medical History:  Past Medical History:   Diagnosis Date   ? BPH (benign prostatic hyperplasia)    ? Dementia    ? Essential hypertension    ? Inguinal hernia without obstruction or gangrene, recurrence not specified, unspecified laterality 04/02/2013    Left   ? Mild cognitive impairment with memory loss    ? S/P TURP    ? Weight loss            Surgical History:  Past Surgical History:   Procedure Laterality Date   ? COLONOSCOPY     ? EYE SURGERY     ?  Fulgurate bleeders      evacuate clots   ? Laproscopic herniorrhaphy inguinal Bilateral    ? TRANSURETHRAL RESECTION OF BLADDER      Cytoscopy TURP combined       Family History:   Family History   Problem Relation Age of Onset   ? Heart disease Mother    ? Hypertension Mother    ? Heart disease Brother         Respiratory   ? Colon cancer Maternal Grandmother         colorectal       Social History:    Social History     Socioeconomic History   ? Marital status:      Spouse name: Not on file   ? Number of children: Not on file   ? Years of education: Not on file   ? Highest education level: Not on file   Occupational History   ? Not on file   Social Needs   ? Financial resource strain: Not on file   ? Food insecurity     Worry: Not on file     Inability: Not on file   ? Transportation needs     Medical: Not on file     Non-medical: Not on file   Tobacco Use   ? Smoking status: Never Smoker   ? Smokeless tobacco: Never Used   Substance and Sexual Activity   ? Alcohol use: Yes     Comment: 1-2 per week   ? Drug use: Not on file   ? Sexual activity: Not on file   Lifestyle   ? Physical activity     Days per week: Not on file     Minutes per session: Not on file   ? Stress: Not on file   Relationships   ? Social connections     Talks on phone: Not on file     Gets together: Not on file     Attends Baptism service: Not on file     Active member of club or organization: Not on file     Attends meetings of clubs or organizations: Not on file     Relationship status: Not on file   ? Intimate partner violence     Fear of current or ex partner: Not on file     Emotionally abused: Not on file     Physically abused: Not on file     Forced sexual activity: Not on file   Other Topics Concern   ? Incontinent Not Asked   ? Toileting independently Not Asked   ? Cognitive impairment Not Asked   ? Vision impairment Not Asked   ? Hearing impairment Not Asked   ? Dentures Not Asked   Social History Narrative   ? Not on file           Review of Systems   Constitutional: Positive for activity change and fatigue. Negative for appetite change, chills, diaphoresis and fever.        No concerns   HENT: Negative for congestion and hearing loss.    Eyes: Negative.    Respiratory: Negative for shortness of breath and wheezing.    Cardiovascular: Negative.    Gastrointestinal: Negative.    Endocrine: Negative.    Genitourinary: Negative for difficulty urinating.   Musculoskeletal:        R hip pain controlled   Skin:        Intact   Allergic/Immunologic: Negative.    Neurological: Negative for dizziness, speech difficulty and light-headedness.   Hematological: Negative.    Psychiatric/Behavioral: Positive for confusion. Negative for agitation and sleep disturbance. The patient is not nervous/anxious and is not hyperactive.         Baseline       Vitals:    07/16/20 1940   BP: 163/90   Pulse: 90   Resp: 18   Temp: 97  F (36.1  C)   SpO2: 95%   Weight: 136 lb (61.7 kg)       Physical Exam   Constitutional: No distress.   New R hip fx, ortho PA to guide tx   HENT:   Head: Normocephalic and atraumatic.   Mouth/Throat: Oropharynx is clear and moist. No oropharyngeal exudate.   Eyes: Right eye exhibits no discharge. Left eye exhibits no discharge. No scleral icterus.   Neck: Normal range of motion. Neck supple. No JVD present.   Cardiovascular: Normal rate. Exam reveals no gallop and no friction rub.   No murmur heard.  Pulmonary/Chest: Effort normal. No stridor. No respiratory distress. He has no wheezes. He has no rales.   Dim, RA   Abdominal: Soft. Bowel sounds are normal. He exhibits no distension. There is no abdominal tenderness. There is no rebound.   No constipation or diarrhea reported   Genitourinary:    Genitourinary Comments: incontinent     Musculoskeletal:         General: No tenderness, deformity or edema.      Comments: R hip pain, usually wc bound   Neurological: He is alert. No cranial nerve deficit.   A/O x1   Skin: Skin is warm and  dry. He is not diaphoretic.   intact   Psychiatric: He has a normal mood and affect.   No behaviors reported   Vitals reviewed.      Medication List:  Current Outpatient Medications   Medication Sig   ? acetaminophen (TYLENOL) 500 MG tablet Take 1,000 mg by mouth 4 (four) times a day.   ? cholecalciferol, vitamin D3, 5,000 unit Tab Take 1 tablet by mouth daily.   ? memantine (NAMENDA) 5 MG tablet Take 5 mg by mouth 2 (two) times a day.   ? polyethylene glycol (MIRALAX) 17 gram packet Take 17 g by mouth daily as needed (Give 17 gram by mouth as needed daily for constipation. Give by mouth or nasogastric tube).   ? potassium chloride (KLOR-CON) 10 MEQ CR tablet Take 10 mEq by mouth daily.   ? QUEtiapine (SEROQUEL) 25 MG tablet Take 12.5 mg by mouth 2 (two) times a day. Give at 0800 and 1600   ? traMADoL (ULTRAM) 50 mg tablet Take 1 tablet (50 mg total) by mouth 3 (three) times a day.       Labs:  Results for orders placed or performed in visit on 06/29/20   Basic Metabolic Panel   Result Value Ref Range    Sodium 137 136 - 145 mmol/L    Potassium 3.7 3.5 - 5.0 mmol/L    Chloride 105 98 - 107 mmol/L    CO2 23 22 - 31 mmol/L    Anion Gap, Calculation 9 5 - 18 mmol/L    Glucose 136 (H) 70 - 125 mg/dL    Calcium 8.1 (L) 8.5 - 10.5 mg/dL    BUN 23 8 - 28 mg/dL    Creatinine 1.06 0.70 - 1.30 mg/dL    GFR MDRD Af Amer >60 >60 mL/min/1.73m2    GFR MDRD Non Af Amer >60 >60 mL/min/1.73m2     Lab Results   Component Value Date    WBC 16.4 (H) 06/29/2020    HGB 14.2 06/29/2020    HCT 41.4 06/29/2020    MCV 94 06/29/2020     06/29/2020     Lab Results   Component Value Date    TSH 0.87 08/14/2019     Vitamin B-12   Date Value Ref Range Status   08/14/2019 463 213 - 816 pg/mL Final     Vitamin D, Total (25-Hydroxy)   Date Value Ref Range Status   08/14/2019 9.5 (L) 30.0 - 80.0 ng/mL Final     Mag 1.9      Assessment/Plan:    New R nondisplaced subcapital hip fx suffered on 6/27/20: family wanted him kept in facility, so Lorin  Ping GEORGE from  has guided tx. Now [facility] and working with therapy    Pain control: continue tylenol 1000mg four times a day and tramadol 50mg three times a day, next week will start to titrate    Hypertension: previously losartan weaned off, more htn evident.  Will monitor BP in evening x5d again    Dementia: Continue Namenda 5 mg twice daily and continue seroquel 12.5mg two times a day, effective    New leukocytosis: Last white count 16.4 on , possibly due to fracture, UA/UC negative, probably d/t fx    Hypokalemia: last K 3.7 on , continue potassium 10mEq daily.     Vit D def: continue D3 5000U daily    Normocytic anemia: last Hgb 14.2 on     Insomnia: melatonin discontinued, no issues reported    Constipation: Continue MiraLAX daily as needed     Disposition: Wife has , now family was seeking [facility] at [facility] though now resides in LTC. [facility] .       Electronically signed by: Stewart Garcia NP

## 2021-06-20 NOTE — LETTER
Letter by Stewart Garcia NP at      Author: Stewart Garcia NP Service: -- Author Type: --    Filed:  Encounter Date: 6/29/2020 Status: (Other)         Patient: Wilian Vallejo   MR Number: [id number]   YOB: 1933   Date of Visit: 6/29/2020     [facility]      Facility:    [facility] [[id number]]  Code Status: DNR      Chief Complaint/Reason for Visit:    Chief Complaint   Patient presents with   ? Problem Visit     R hip fx       HPI:   Wilian is a 86 y.o. male who initially was a transfer from [facility] with an admission on 5/28/19 and discharge on 5/30/19. He has a PMH of dementia, htn, TURP, who has a wife that has been recently hospitalized at [facility] and his health deteriorated while she was away. He was admitted for weakness and lethargy. He received IVF and thiazide was held. He may need LTC if wife unable to care for him. He was subsequently transferred to the TCU for rehab and possible change of disposition.    LTC:   He finished his TCU stay and now resides in LTC as of 6/2019 as his wife passed away. He is wc bound with no behaviors reported. He has limited concerns.  His htn is well controlled. Had some recent falls w/o injury, though on 6/27/20 he suffered a R hip fx.  Family was consulted and they decided to keep him in the facility with the intent for luiza Galan to guide tx.      Past Medical History:  Past Medical History:   Diagnosis Date   ? BPH (benign prostatic hyperplasia)    ? Dementia    ? Essential hypertension    ? Inguinal hernia without obstruction or gangrene, recurrence not specified, unspecified laterality 04/02/2013    Left   ? Mild cognitive impairment with memory loss    ? S/P TURP    ? Weight loss            Surgical History:  Past Surgical History:   Procedure Laterality Date   ? COLONOSCOPY     ? EYE SURGERY     ? Fulgurate bleeders      evacuate clots   ? Laproscopic herniorrhaphy inguinal  Bilateral    ? TRANSURETHRAL RESECTION OF BLADDER      Cytoscopy TURP combined       Family History:   Family History   Problem Relation Age of Onset   ? Heart disease Mother    ? Hypertension Mother    ? Heart disease Brother         Respiratory   ? Colon cancer Maternal Grandmother         colorectal       Social History:    Social History     Socioeconomic History   ? Marital status: Patient Declined     Spouse name: Not on file   ? Number of children: Not on file   ? Years of education: Not on file   ? Highest education level: Not on file   Occupational History   ? Not on file   Social Needs   ? Financial resource strain: Not on file   ? Food insecurity     Worry: Not on file     Inability: Not on file   ? Transportation needs     Medical: Not on file     Non-medical: Not on file   Tobacco Use   ? Smoking status: Never Smoker   ? Smokeless tobacco: Never Used   Substance and Sexual Activity   ? Alcohol use: Yes     Comment: 1-2 per week   ? Drug use: Not on file   ? Sexual activity: Not on file   Lifestyle   ? Physical activity     Days per week: Not on file     Minutes per session: Not on file   ? Stress: Not on file   Relationships   ? Social connections     Talks on phone: Not on file     Gets together: Not on file     Attends [employer] service: Not on file     Active member of club or organization: Not on file     Attends meetings of clubs or organizations: Not on file     Relationship status: Not on file   ? Intimate partner violence     Fear of current or ex partner: Not on file     Emotionally abused: Not on file     Physically abused: Not on file     Forced sexual activity: Not on file   Other Topics Concern   ? Incontinent Not Asked   ? Toileting independently Not Asked   ? Cognitive impairment Not Asked   ? Vision impairment Not Asked   ? Hearing impairment Not Asked   ? Dentures Not Asked   Social History Narrative   ? Not on file          Review of Systems   Constitutional: Positive for activity  "change. Negative for appetite change, chills, diaphoresis, fatigue and fever.        New pain in R hip   HENT: Negative for congestion and hearing loss.    Eyes: Negative.    Respiratory: Negative for shortness of breath and wheezing.    Cardiovascular: Negative.    Gastrointestinal: Negative.    Endocrine: Negative.    Genitourinary: Negative for difficulty urinating.   Musculoskeletal:        R hip pain   Skin:        Intact   Allergic/Immunologic: Negative.    Neurological: Negative for dizziness, speech difficulty and light-headedness.   Hematological: Negative.    Psychiatric/Behavioral: Positive for confusion. Negative for agitation and sleep disturbance. The patient is not nervous/anxious and is not hyperactive.         Baseline       Vitals:    06/29/20 1913   BP: 160/60   Pulse: 90   Resp: 20   Temp: 97  F (36.1  C)   SpO2: 96%   Weight: 146 lb (66.2 kg)   Height: 5' 10\" (1.778 m)       Physical Exam   Constitutional: No distress.   New R hip fx, ortho PA to guide tx   HENT:   Head: Normocephalic and atraumatic.   Mouth/Throat: Oropharynx is clear and moist. No oropharyngeal exudate.   Eyes: Right eye exhibits no discharge. Left eye exhibits no discharge. No scleral icterus.   Neck: Normal range of motion. Neck supple. No JVD present.   Cardiovascular: Normal rate. Exam reveals no gallop and no friction rub.   No murmur heard.  Pulmonary/Chest: Effort normal. No stridor. No respiratory distress. He has no wheezes. He has no rales.   Dim, RA   Abdominal: Soft. Bowel sounds are normal. He exhibits no distension. There is no abdominal tenderness. There is no rebound.   No constipation or diarrhea reported   Genitourinary:    Genitourinary Comments: incontinent     Musculoskeletal:         General: No tenderness, deformity or edema.      Comments: R hip pain, usually wc bound   Neurological: He is alert. No cranial nerve deficit.   A/O x1   Skin: Skin is warm and dry. He is not diaphoretic.   intact "   Psychiatric: He has a normal mood and affect.   No behaviors reported   Vitals reviewed.      Medication List:  Current Outpatient Medications   Medication Sig   ? acetaminophen (TYLENOL) 500 MG tablet Take 1,000 mg by mouth 4 (four) times a day.   ? QUEtiapine (SEROQUEL) 25 MG tablet Take 12.5 mg by mouth 2 (two) times a day. Give at 0800 and 1600   ? traMADoL (ULTRAM) 50 mg tablet Take 25 mg by mouth 3 (three) times a day.   ? cholecalciferol, vitamin D3, 5,000 unit Tab Take 1 tablet by mouth daily.   ? memantine (NAMENDA) 5 MG tablet Take 5 mg by mouth 2 (two) times a day.   ? polyethylene glycol (MIRALAX) 17 gram packet Take 17 g by mouth daily as needed (Give 17 gram by mouth as needed daily for constipation. Give by mouth or nasogastric tube).   ? potassium chloride (KLOR-CON) 10 MEQ CR tablet Take 10 mEq by mouth daily.       Labs:  Results for orders placed or performed in visit on 06/29/20   Basic Metabolic Panel   Result Value Ref Range    Sodium 137 136 - 145 mmol/L    Potassium 3.7 3.5 - 5.0 mmol/L    Chloride 105 98 - 107 mmol/L    CO2 23 22 - 31 mmol/L    Anion Gap, Calculation 9 5 - 18 mmol/L    Glucose 136 (H) 70 - 125 mg/dL    Calcium 8.1 (L) 8.5 - 10.5 mg/dL    BUN 23 8 - 28 mg/dL    Creatinine 1.06 0.70 - 1.30 mg/dL    GFR MDRD Af Amer >60 >60 mL/min/1.73m2    GFR MDRD Non Af Amer >60 >60 mL/min/1.73m2     Lab Results   Component Value Date    WBC 16.4 (H) 06/29/2020    HGB 14.2 06/29/2020    HCT 41.4 06/29/2020    MCV 94 06/29/2020     06/29/2020     Lab Results   Component Value Date    TSH 0.87 08/14/2019     Vitamin B-12   Date Value Ref Range Status   08/14/2019 463 213 - 816 pg/mL Final     Vitamin D, Total (25-Hydroxy)   Date Value Ref Range Status   08/14/2019 9.5 (L) 30.0 - 80.0 ng/mL Final     Mag 1.9      Assessment/Plan:    New R nondisplaced subcapital hip fx suffered on 6/27/20: family wants him kept in facility, so Lorin GEORGE from  will guide his tx, will keep  him non-weight bearing now    Pain control: tylenol increased to 1000mg four times a day and start tramadol 25mg TID    Hypertension: previously losartan weaned off, more htn evident. Now losartan restarted,  And currently losartan 25mg daily. SBP <150    Dementia: Continue Namenda 5 mg twice daily and start seroquel 12.5mg two times a day     New leukocytosis: Last white count 16.4 on , possibly due to fracture, will check UA UC as well    Hypokalemia: last K 3.7 on , continue potassium 10mEq daily.     Vit D def: continue D3 5000U daily    Normocytic anemia: last Hgb 14.2 on     Insomnia: melatonin discontinued, no issues reported    Constipation: Continue MiraLAX daily as needed     Disposition: Wife has , now family was seeking placement at [facility] though now resides in [facility] .     The care plan has been reviewed and all orders signed. Changes to care plan, if any, as noted. Otherwise, continue care plan of care.  The total time spent with this patient was 42 minutes, with greater than 50% spent in counseling and coordination of care that included multiple issues per pain control, consult with ortho PA and labs, which lasted 23 minutes.        Electronically signed by: Stewart Garcia NP

## 2021-06-20 NOTE — LETTER
Letter by Dinora Marin MBBS at      Author: Dinora Marin MBBS Service: -- Author Type: --    Filed:  Encounter Date: 4/15/2020 Status: (Other)         Patient: Wilian Vallejo   MR Number: [id number]   YOB: 1933   Date of Visit: 4/15/2020       [facility] Admission note      Patient: Wilian Vallejo  MRN: [id number]  Date of Service: 4/15/2020      [facility] [[id number]]  Reason for Visit     Chief Complaint   Patient presents with   ? Problem Visit   Follow-up and bradycardia    Code Status     DNR/DNI    Assessment     Acute episode of unresponsiveness /patient admitted to the hospital and noted to have bradycardia with heart rate in the 40s  Dementia currently on Namenda SLUMS 7/30  History of hypertension now on a low-dose of losartan 25 mg daily; discontinued due to low blood pressures  Hypokalemia on supplementation  BPH  Debilitation generalized weakness    Plan     Patient is a resident of long-term care.  He was sent to the emergency room after nursing reported that he was having ongoing episodes of emesis and unresponsiveness.  Vitals were unstable at that point and in light of advanced dementia and patient being a poor historian but being a full code he was sent to the hospital.  In the hospital he had persistent low blood pressures and heart rates in the 40s.  He was noted to have sinus bradycardia with a first-degree AV block  Cardiology was consulted but since patient was asymptomatic they evaluated him and recommended no further medications.  Family was consulted they agreed to just monitoring they refused any further intervention including pacemaker placement.  Son change his CODE STATUS to DNR/DNI and has been discharged back to the TCU.  Losartan discontinued in the hospital due to low blood pressures  At baseline patient is pleasantly confused and remains a poor historian.  Emergency room and hospital visit notes were reviewed and medication changes  were also reviewed  We will also have  consult to discuss with family if they are interested in considering do not hospitalize also for this patient in hospice if he further declines as they have refused any aggressive cares in the hospital and switched him from full code to DNR/DNI.   consult requested immediately.  Heart rates were reviewed and so far they appear to be stable in the 70s patient is asymptomatic.  Also will defer any lab for now most of his work-up in the hospital was normal    History     Patient is a very pleasant 86 y.o. male who is a resident of [facility]  Patient had acute episode of.  He was laid in his bed unfortunately due to dementia remains a very poor historian.  At that time his vitals were unstable and heart rates were felt to be very low with heart rates in the 40s he was sent to the emergency room.  While in the hospital he was noted to have persistent bradycardia with heart rates in the 40s.  He was somewhat symptomatic with nausea and emesis reported.  Cardiology consultation obtain EKG to showed a first-degree AV block  Family refused further intervention including pacemaker placement  He also has a history of hypertension and remains on a very low-dose of losartan blood pressures in the hospital continue to be low suspected to be secondary to dehydration and poor oral intake and overall decline he has been taken off losartan prior to discharge  He also has a history of hypokalemia and is on potassium supplementation recheck potassiums have been stable to    Past Medical History     Active Ambulatory (Non-Hospital) Problems    Diagnosis   ? Adjustment insomnia   ? Weakness   ? Alzheimer's dementia without behavioral disturbance (H)   ? Essential hypertension   ? Slow transit constipation   ? Hypokalemia     Past Medical History:   Diagnosis Date   ? BPH (benign prostatic hyperplasia)    ? Dementia    ? Essential hypertension    ? Inguinal hernia without  obstruction or gangrene, recurrence not specified, unspecified laterality 04/02/2013   ? Mild cognitive impairment with memory loss    ? S/P TURP    ? Weight loss        Past Social History     Reviewed, and he  reports that he has never smoked. He has never used smokeless tobacco. He reports current alcohol use.    Family History     Reviewed, and family history includes Colon cancer in his maternal grandmother; Heart disease in his brother and mother; Hypertension in his mother.    Medication List     Current Outpatient Medications on File Prior to Visit   Medication Sig Dispense Refill   ? acetaminophen (TYLENOL) 325 MG tablet Take 650 mg by mouth every 6 (six) hours as needed for pain (Give 2 tablets by mouth every 6 hour as needed for pain).     ? cholecalciferol, vitamin D3, 5,000 unit Tab Take 1 tablet by mouth daily.     ? losartan (COZAAR) 50 MG tablet Take 25 mg by mouth daily. Hold for SBP <130 or DBP <85            ? memantine (NAMENDA) 5 MG tablet Take 5 mg by mouth 2 (two) times a day.     ? polyethylene glycol (MIRALAX) 17 gram packet Take 17 g by mouth daily as needed (Give 17 gram by mouth as needed daily for constipation. Give by mouth or nasogastric tube).     ? potassium chloride (KLOR-CON) 10 MEQ CR tablet Take 10 mEq by mouth daily.       No current facility-administered medications on file prior to visit.    Losartan has been discontinued prior to discharge from the hospital    Allergies     No Known Allergies    Review of Systems   A comprehensive review of 14 systems was done. Pertinent findings noted here and in history of present illness. All the rest negative.  Constitutional: Negative.  Negative for fever, chills, he has activity change, appetite change and fatigue.   HENT: Negative for congestion and facial swelling.    Eyes: Negative for photophobia, redness and visual disturbance.   Respiratory: Negative for cough and chest tightness.    Cardiovascular: Negative for chest pain,  "palpitations and has NO significant leg swelling.   Gastrointestinal: Negative for nausea, diarrhea, constipation, blood in stool and abdominal distention.   Genitourinary: Negative.    Musculoskeletal: Negative.  Has difficulty walking does use a walker  Skin: Negative.    Neurological: Negative for dizziness, tremors, syncope, weakness, light-headedness and headaches.   Hematological: Does not bruise/bleed easily.   Psychiatric/Behavioral: Negative.  CONFused with limited recall      Physical Exam     Recent Vitals 2/28/2020   Height 5' 10\"   Weight 148 lbs   BSA (m2) 1.82 m2   /86   Pulse 65   Temp 98   SpO2 98   Some recent data might be hidden       Constitutional: Oriented to person, and appears well-developed.   HEENT:  Normocephalic and atraumatic.  Eyes: Conjunctivae and EOM are normal. Pupils are equal, round, and reactive to light. No discharge.  No scleral icterus. Nose normal. Mouth/Throat: Oropharynx is clear and moist. No oropharyngeal exudate.    NECK: Normal range of motion. Neck supple. No JVD present. No tracheal deviation present. No thyromegaly present.   CARDIOVASCULAR: Normal rate, regular rhythm and intact distal pulses.  Exam reveals noJVD  PULMONARY: Effort normal and breath sounds normal. No respiratory distress.  ABDOMEN: Soft. Bowel sounds are normal. No distension and no mass.  There is no tenderness. There is no rebound and no guarding. No HSM.  MUSCULOSKELETAL: Normal range of motion. TRACE leg edema and no tenderness. Mild kyphosis, no tenderness.  LYMPH NODES: Has no cervical, supraclavicular, axillary and groin adenopathy.   NEUROLOGICAL: Alert and oriented to person, . No cranial nerve deficit.  Normal muscle tone. Coordination normal.   GENITOURINARY: Deferred exam.  SKIN: Skin is warm and dry. No rash noted. No erythema. No pallor.   EXTREMITIES: No cyanosis, no clubbing, TRACE leg edema. No Deformity.  PSYCHIATRIC: Normal mood, affect and behavior.  Recall is " impaired      Lab Results     Labs done in the hospital were reviewed his sodium was 137  K 4.4  Creatinine 1  Hemoglobin was 13  Mag 1.9    EKG showed heart rates in the 40s with a first-degree AV block  CXR -NEG  SOLOMON Marshall

## 2021-06-21 NOTE — LETTER
Letter by Dinora Marin MBBS at      Author: Dinora Marin MBBS Service: -- Author Type: --    Filed:  Encounter Date: 4/14/2021 Status: (Other)         Patient: Wilian Vallejo   MR Number: [id number]   YOB: 1933   Date of Visit: 4/14/2021       [facility] Admission note      Patient: Wilian Vallejo  MRN: [id number]  Date of Service: 4/14/2021      [facility] [[id number]]  Reason for Visit     Chief Complaint   Patient presents with   ? Review Of Multiple Medical Conditions   Follow-up dementia    Code Status     DNR/DNI    Assessment     Progressive weight loss with failure to thrive now on hospice cares  Advanced dementia   History of hypertension now on a low-dose of losartan 25 mg daily; discontinued due to low blood pressures  Hypokalemia on supplementation  BPH  Debilitation generalized weakness  History of a hip fracture treated conservatively in the TCU  -History of recurrent falls    Plan     Patient is currently long-term care.  He has had progressive decline with dementia and currently on hospice cares.  He is on minimal medications.  At baseline he is alert and oriented to himself.  Physical remains debilitated and requires a higher level of care no longer ambulating using a wheelchair.  He is appropriate to continue on hospice  Weights have been stable though oral intake continues to be erratic.  Care will be supportive at the request of family    History     Patient is a very pleasant 87 y.o. male who is a resident of LTC  Patient unfortunately has advanced dementia due to which she remains a poor historian at best he is alert to only himself.  He has continued to decline.  He has been switched to hospice care due to advanced dementia at present alert and oriented only to himself    He had a fall subsequently was diagnosed with nondisplaced subcapital hip fracture on 6/27/2020.  Family declined hospitalization.   He was in the nursing home and has  become progressively nonambulatory and wheelchair-bound.  He continues to require significant assistance with staff.  Pain is controlled using Tylenol  Progress is limited due to profound cognitive impairment and baseline debilitation  Pain management has been optimized using hospice support.  He has a recent history of significant weight loss now weights appear to be stable around 135 pounds.  Oral intake remains erratic and some days he does not eat other days he eats less than 50% of the offered meal    Past Medical History     Active Ambulatory (Non-Hospital) Problems    Diagnosis   ? Hospice care   ? 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 Pain   ? Adjustment insomnia   ? Weakness   ? Alzheimer's dementia without behavioral disturbance (H)   ? Essential hypertension   ? Slow transit constipation     Past Medical History:   Diagnosis Date   ? BPH (benign prostatic hyperplasia)    ? Dementia    ? Essential hypertension    ? Inguinal hernia without obstruction or gangrene, recurrence not specified, unspecified laterality 04/02/2013   ? Mild cognitive impairment with memory loss    ? S/P TURP    ? Weight loss        Past Social History     Reviewed, and he  reports that he has never smoked. He has never used smokeless tobacco. He reports current alcohol use.    Family History     Reviewed, and family history includes Colon cancer in his maternal grandmother; Heart disease in his brother and mother; Hypertension in his mother.    Medication List     Current Outpatient Medications on File Prior to Visit   Medication Sig Dispense Refill   ? acetaminophen (TYLENOL) 500 MG tablet Take 1,000 mg by mouth 4 (four) times a day.     ? lidocaine 4 % patch Place 1 patch on the skin daily. Remove and discard patch with 12 hours. Apply to R hip     ? oxyCODONE (ROXICODONE) 5 MG immediate release tablet Take 1 tablet (5 mg total) by mouth 3 (three) times a day. And 5mg q4h PRN (Patient taking differently: Take 2.5 mg by  mouth 2 (two) times a day. And 2.5mg q4h PRN) 30 tablet 0   ? polyethylene glycol (MIRALAX) 17 gram packet Take 17 g by mouth daily as needed (Give 17 gram by mouth as needed daily for constipation. Give by mouth or nasogastric tube).     ? QUEtiapine (SEROQUEL) 25 MG tablet Take 12.5 mg by mouth 2 (two) times a day. Give at 0800 and 1600       No current facility-administered medications on file prior to visit.    Losartan has been discontinued prior to discharge from the hospital    Allergies     No Known Allergies    Review of Systems   A comprehensive review of 14 systems was done. Pertinent findings noted here and in history of present illness. All the rest negative.  Constitutional: Negative.  Negative for fever, chills, he has activity change, appetite change and fatigue.   HENT: Negative for congestion and facial swelling.    Eyes: Negative for photophobia, redness and visual disturbance.   Respiratory: Negative for cough and chest tightness.    Cardiovascular: Negative for chest pain, palpitations and has NO significant leg swelling.   Gastrointestinal: Negative for nausea, diarrhea, constipation, blood in stool and abdominal distention.   Genitourinary: Negative.    Musculoskeletal: Negative.  Has difficulty walking does use a walker  Skin: Negative.    Neurological: Negative for dizziness, tremors, syncope, weakness, light-headedness and headaches.   Hematological: Does not bruise/bleed easily.   Psychiatric/Behavioral: Negative.  CONFused with limited recall      Physical Exam     Recent Vitals 1/27/2021   Height -   Weight 136 lbs   BSA (m2) 1.75 m2   /78   Pulse 65   Temp 97   SpO2 98   Some recent data might be hidden     GENERAL: no acute distress. Cooperative in conversation.   HEENT: pupils are equal, round and reactive. Oral mucosa is moist and intact.  RESP:Chest symmetric. Regular respiratory rate. No stridor.  ABD: Nondistended, soft.  EXTREMITIES: No lower extremity edema.   NEURO: non  focal. Alert and oriented X SELF.   PSYCH: within normal limits. No depression or anxiety.  SKIN: warm dry intact , ecchymosis with skin tear of the right forearm noted         Lab Results     Results for orders placed or performed in visit on 09/21/20   Basic Metabolic Panel   Result Value Ref Range    Sodium 136 136 - 145 mmol/L    Potassium 4.1 3.5 - 5.0 mmol/L    Chloride 107 98 - 107 mmol/L    CO2 22 22 - 31 mmol/L    Anion Gap, Calculation 7 5 - 18 mmol/L    Glucose 84 70 - 125 mg/dL    Calcium 9.0 8.5 - 10.5 mg/dL    BUN 24 8 - 28 mg/dL    Creatinine 1.00 0.70 - 1.30 mg/dL    GFR MDRD Af Amer >60 >60 mL/min/1.73m2    GFR MDRD Non Af Amer >60 >60 mL/min/1.73m2       Recheck x-ray shows a nondisplaced right subcapital femur fracture with no displacement  OSLOMON Marshall

## 2021-06-21 NOTE — LETTER
Letter by Stewart Garcia NP at      Author: Stewart Garcia NP Service: -- Author Type: --    Filed:  Encounter Date: 1/27/2021 Status: (Other)         Patient: Wilian Vallejo   MR Number: [id number]   YOB: 1933   Date of Visit: 1/27/2021     [facility]      Facility:    [facility] [[id number]]  Code Status: DNR      Chief Complaint/Reason for Visit:    Chief Complaint   Patient presents with   ? Review Of Multiple Medical Conditions     hospice       HPI:   Wilian is a 87 y.o. male who initially was a transfer from [facility] with an admission on 5/28/19 and discharge on 5/30/19. He has a PMH of dementia, htn, TURP, who has a wife that has been recently hospitalized at [facility] and his health deteriorated while she was away. He was admitted for weakness and lethargy. He received IVF and thiazide was held. He may need LTC if wife unable to care for him. He was subsequently transferred to the TCU for rehab and possible change of disposition.    LTC:   He finished his TCU stay and now resides in LTC as of 6/2019 as his wife passed away. He is wc bound with no behaviors reported. He has limited concerns.  His htn is well controlled. Had some recent falls w/o injury, though on 6/27/20 he suffered a R hip fx.  Family was consulted and they decided to keep him in facility with tx guidance from ortho PA. Today he continues on hospice care. He denies pain and needs no medications changes today.      Past Medical History:  Past Medical History:   Diagnosis Date   ? BPH (benign prostatic hyperplasia)    ? Dementia    ? Essential hypertension    ? Inguinal hernia without obstruction or gangrene, recurrence not specified, unspecified laterality 04/02/2013    Left   ? Mild cognitive impairment with memory loss    ? S/P TURP    ? Weight loss            Surgical History:  Past Surgical History:   Procedure Laterality Date   ? COLONOSCOPY     ? EYE SURGERY      ? Fulgurate bleeders      evacuate clots   ? Laproscopic herniorrhaphy inguinal Bilateral    ? TRANSURETHRAL RESECTION OF BLADDER      Cytoscopy TURP combined       Family History:   Family History   Problem Relation Age of Onset   ? Heart disease Mother    ? Hypertension Mother    ? Heart disease Brother         Respiratory   ? Colon cancer Maternal Grandmother         colorectal       Social History:    Social History     Socioeconomic History   ? Marital status:      Spouse name: Not on file   ? Number of children: Not on file   ? Years of education: Not on file   ? Highest education level: Not on file   Occupational History   ? Not on file   Social Needs   ? Financial resource strain: Not on file   ? Food insecurity     Worry: Not on file     Inability: Not on file   ? Transportation needs     Medical: Not on file     Non-medical: Not on file   Tobacco Use   ? Smoking status: Never Smoker   ? Smokeless tobacco: Never Used   Substance and Sexual Activity   ? Alcohol use: Yes     Comment: 1-2 per week   ? Drug use: Not on file   ? Sexual activity: Not on file   Lifestyle   ? Physical activity     Days per week: Not on file     Minutes per session: Not on file   ? Stress: Not on file   Relationships   ? Social connections     Talks on phone: Not on file     Gets together: Not on file     Attends Rastafarian service: Not on file     Active member of club or organization: Not on file     Attends meetings of clubs or organizations: Not on file     Relationship status: Not on file   ? Intimate partner violence     Fear of current or ex partner: Not on file     Emotionally abused: Not on file     Physically abused: Not on file     Forced sexual activity: Not on file   Other Topics Concern   ? Incontinent Not Asked   ? Toileting independently Not Asked   ? Cognitive impairment Not Asked   ? Vision impairment Not Asked   ? Hearing impairment Not Asked   ? Dentures Not Asked   Social History Narrative   ? Not on  file          Review of Systems   Constitutional: Positive for activity change. Negative for appetite change, chills, diaphoresis, fatigue and fever.        No concerns   HENT: Negative for congestion and hearing loss.    Eyes: Negative.    Respiratory: Negative for shortness of breath and wheezing.    Cardiovascular: Negative.    Gastrointestinal: Negative.    Endocrine: Negative.    Genitourinary: Negative for difficulty urinating.   Musculoskeletal:        More R hip pain, recent fall   Skin:        Intact   Allergic/Immunologic: Negative.    Neurological: Negative for dizziness, speech difficulty and light-headedness.   Hematological: Negative.    Psychiatric/Behavioral: Positive for confusion. Negative for agitation and sleep disturbance. The patient is not nervous/anxious and is not hyperactive.         Baseline       Vitals:    01/27/21 1118   BP: 148/78   Pulse: 65   Resp: 16   Temp: 97  F (36.1  C)   SpO2: 98%   Weight: 136 lb (61.7 kg)       Physical Exam   Constitutional: No distress.   Hospice care, denies pain   HENT:   Head: Normocephalic and atraumatic.   Mouth/Throat: Oropharynx is clear and moist. No oropharyngeal exudate.   Eyes: Right eye exhibits no discharge. Left eye exhibits no discharge. No scleral icterus.   Neck: Normal range of motion. Neck supple. No JVD present.   Cardiovascular: Normal rate.   Pulmonary/Chest: Effort normal. No stridor. No respiratory distress.   RA, Dim   Abdominal: He exhibits no distension.   No constipation or diarrhea reported   Genitourinary:    Genitourinary Comments: incontinent     Musculoskeletal:         General: No edema.      Comments: Denies pain, terrence lift   Neurological: He is alert. No cranial nerve deficit.   A/O x1   Skin: Skin is warm and dry. He is not diaphoretic.   intact   Psychiatric: He has a normal mood and affect.   No behaviors reported   Vitals reviewed.      Medication List:  Current Outpatient Medications   Medication Sig   ?  acetaminophen (TYLENOL) 500 MG tablet Take 1,000 mg by mouth 4 (four) times a day.   ? lidocaine 4 % patch Place 1 patch on the skin daily. Remove and discard patch with 12 hours. Apply to R hip   ? oxyCODONE (ROXICODONE) 5 MG immediate release tablet Take 1 tablet (5 mg total) by mouth 3 (three) times a day. And 5mg q4h PRN (Patient taking differently: Take 2.5 mg by mouth 2 (two) times a day. And 2.5mg q4h PRN)   ? polyethylene glycol (MIRALAX) 17 gram packet Take 17 g by mouth daily as needed (Give 17 gram by mouth as needed daily for constipation. Give by mouth or nasogastric tube).   ? QUEtiapine (SEROQUEL) 25 MG tablet Take 12.5 mg by mouth 2 (two) times a day. Give at 0800 and 1600       Labs:  Results for orders placed or performed in visit on 09/21/20   Basic Metabolic Panel   Result Value Ref Range    Sodium 136 136 - 145 mmol/L    Potassium 4.1 3.5 - 5.0 mmol/L    Chloride 107 98 - 107 mmol/L    CO2 22 22 - 31 mmol/L    Anion Gap, Calculation 7 5 - 18 mmol/L    Glucose 84 70 - 125 mg/dL    Calcium 9.0 8.5 - 10.5 mg/dL    BUN 24 8 - 28 mg/dL    Creatinine 1.00 0.70 - 1.30 mg/dL    GFR MDRD Af Amer >60 >60 mL/min/1.73m2    GFR MDRD Non Af Amer >60 >60 mL/min/1.73m2     Lab Results   Component Value Date    WBC 16.4 (H) 06/29/2020    HGB 14.2 06/29/2020    HCT 41.4 06/29/2020    MCV 94 06/29/2020     06/29/2020     Lab Results   Component Value Date    TSH 0.87 08/14/2019     Vitamin B-12   Date Value Ref Range Status   08/14/2019 463 213 - 816 pg/mL Final     Vitamin D, Total (25-Hydroxy)   Date Value Ref Range Status   08/14/2019 9.5 (L) 30.0 - 80.0 ng/mL Final     Mag 1.9      Assessment/Plan:    Hospice care: continues with [facility] as of 7/2020    New R nondisplaced subcapital hip fx suffered on 6/27/20: family wanted him kept in facility, so Lorin GEORGE from  has guided tx. Now no pain associated, uses terrence for transfers    Pain control: continue tylenol 1000mg four times a day and  lidocaine patch/gel. Per stability will continue oxycodone 2.5mg to two times a day with 2.5mg q4h PRN. Denies pain    Hypertension: previously losartan weaned off, more htn evident. Per monitoring, SBP <150    Dementia: continue seroquel 12.5mg two times a day, discontinued namenda prior    Hx of leukocytosis: Last white count 16.4 on 6/29/20, possibly due to fracture, UA/UC negative, probably d/t fx. No longer monitoring    Hypokalemia: last K 3.7 on 6/29/20, no longer monitoring    Vit D def: continue D3 5000U daily, discontinued supplement    Normocytic anemia: last Hgb 14.2 on 6/29. No longer monitoring    Insomnia: melatonin discontinued, no issues reported    Constipation: Continue MiraLAX daily as needed     Disposition: Resides in LTC. Total lift with all transfers. WC bound. SLUMS 7/30.     Electronically signed by: Stewart Garcia NP

## 2021-06-21 NOTE — LETTER
Letter by Stewart Garcia NP at      Author: Stewart Garcia NP Service: -- Author Type: --    Filed:  Encounter Date: 10/21/2020 Status: (Other)         Patient: Wilian Vallejo   MR Number: [id number]   YOB: 1933   Date of Visit: 10/21/2020     [facility]      Facility:    [facility] [[id number]]  Code Status: DNR      Chief Complaint/Reason for Visit:    Chief Complaint   Patient presents with   ? Review Of Multiple Medical Conditions     hospice       HPI:   Wilian is a 87 y.o. male who initially was a transfer from [facility] with an admission on 5/28/19 and discharge on 5/30/19. He has a PMH of dementia, htn, TURP, who has a wife that has been recently hospitalized at [facility] and his health deteriorated while she was away. He was admitted for weakness and lethargy. He received IVF and thiazide was held. He may need LTC if wife unable to care for him. He was subsequently transferred to the TCU for rehab and possible change of disposition.    LTC:   He finished his TCU stay and now resides in LTC as of 6/2019 as his wife passed away. He is wc bound with no behaviors reported. He has limited concerns.  His htn is well controlled. Had some recent falls w/o injury, though on 6/27/20 he suffered a R hip fx.  Family was consulted and they decided to keep him in facility with tx guidance from ortho PA. Today he continues on hospice with no issues.       Past Medical History:  Past Medical History:   Diagnosis Date   ? BPH (benign prostatic hyperplasia)    ? Dementia    ? Essential hypertension    ? Inguinal hernia without obstruction or gangrene, recurrence not specified, unspecified laterality 04/02/2013    Left   ? Mild cognitive impairment with memory loss    ? S/P TURP    ? Weight loss            Surgical History:  Past Surgical History:   Procedure Laterality Date   ? COLONOSCOPY     ? EYE SURGERY     ? Fulgurate bleeders      evacuate  clots   ? Laproscopic herniorrhaphy inguinal Bilateral    ? TRANSURETHRAL RESECTION OF BLADDER      Cytoscopy TURP combined       Family History:   Family History   Problem Relation Age of Onset   ? Heart disease Mother    ? Hypertension Mother    ? Heart disease Brother         Respiratory   ? Colon cancer Maternal Grandmother         colorectal       Social History:    Social History     Socioeconomic History   ? Marital status:      Spouse name: Not on file   ? Number of children: Not on file   ? Years of education: Not on file   ? Highest education level: Not on file   Occupational History   ? Not on file   Social Needs   ? Financial resource strain: Not on file   ? Food insecurity     Worry: Not on file     Inability: Not on file   ? Transportation needs     Medical: Not on file     Non-medical: Not on file   Tobacco Use   ? Smoking status: Never Smoker   ? Smokeless tobacco: Never Used   Substance and Sexual Activity   ? Alcohol use: Yes     Comment: 1-2 per week   ? Drug use: Not on file   ? Sexual activity: Not on file   Lifestyle   ? Physical activity     Days per week: Not on file     Minutes per session: Not on file   ? Stress: Not on file   Relationships   ? Social connections     Talks on phone: Not on file     Gets together: Not on file     Attends Christian service: Not on file     Active member of club or organization: Not on file     Attends meetings of clubs or organizations: Not on file     Relationship status: Not on file   ? Intimate partner violence     Fear of current or ex partner: Not on file     Emotionally abused: Not on file     Physically abused: Not on file     Forced sexual activity: Not on file   Other Topics Concern   ? Incontinent Not Asked   ? Toileting independently Not Asked   ? Cognitive impairment Not Asked   ? Vision impairment Not Asked   ? Hearing impairment Not Asked   ? Dentures Not Asked   Social History Narrative   ? Not on file          Review of Systems    Constitutional: Positive for activity change. Negative for appetite change, chills, diaphoresis, fatigue and fever.        No concerns   HENT: Negative for congestion and hearing loss.    Eyes: Negative.    Respiratory: Negative for shortness of breath and wheezing.    Cardiovascular: Negative.    Gastrointestinal: Negative.    Endocrine: Negative.    Genitourinary: Negative for difficulty urinating.   Musculoskeletal:        More R hip pain, recent fall   Skin:        Intact   Allergic/Immunologic: Negative.    Neurological: Negative for dizziness, speech difficulty and light-headedness.   Hematological: Negative.    Psychiatric/Behavioral: Positive for confusion. Negative for agitation and sleep disturbance. The patient is not nervous/anxious and is not hyperactive.         Baseline       Vitals:    10/21/20 1453   BP: 124/75   Pulse: 61   Resp: 16   Temp: 97  F (36.1  C)   SpO2: 98%   Weight: 127 lb (57.6 kg)       Physical Exam   Constitutional: No distress.   Hospice care   HENT:   Head: Normocephalic and atraumatic.   Mouth/Throat: Oropharynx is clear and moist. No oropharyngeal exudate.   Eyes: Right eye exhibits no discharge. Left eye exhibits no discharge. No scleral icterus.   Neck: Normal range of motion. Neck supple. No JVD present.   Cardiovascular: Normal rate.   Pulmonary/Chest: Effort normal. No stridor. No respiratory distress.   RA   Abdominal: He exhibits no distension.   No constipation or diarrhea reported   Genitourinary:    Genitourinary Comments: incontinent     Musculoskeletal:         General: No edema.      Comments: Denies pain, terrence lift   Neurological: He is alert. No cranial nerve deficit.   A/O x1   Skin: Skin is warm and dry. He is not diaphoretic.   intact   Psychiatric: He has a normal mood and affect.   No behaviors reported   Vitals reviewed.      Medication List:  Current Outpatient Medications   Medication Sig   ? acetaminophen (TYLENOL) 500 MG tablet Take 1,000 mg by mouth  4 (four) times a day.   ? lidocaine 4 % patch Place 1 patch on the skin daily. Remove and discard patch with 12 hours. Apply to R hip   ? oxyCODONE (ROXICODONE) 5 MG immediate release tablet Take 1 tablet (5 mg total) by mouth 3 (three) times a day. And 5mg q4h PRN (Patient taking differently: Take 2.5 mg by mouth 2 (two) times a day. And 2.5mg q4h PRN)   ? polyethylene glycol (MIRALAX) 17 gram packet Take 17 g by mouth daily as needed (Give 17 gram by mouth as needed daily for constipation. Give by mouth or nasogastric tube).   ? QUEtiapine (SEROQUEL) 25 MG tablet Take 12.5 mg by mouth 2 (two) times a day. Give at 0800 and 1600       Labs:  Results for orders placed or performed in visit on 09/21/20   Basic Metabolic Panel   Result Value Ref Range    Sodium 136 136 - 145 mmol/L    Potassium 4.1 3.5 - 5.0 mmol/L    Chloride 107 98 - 107 mmol/L    CO2 22 22 - 31 mmol/L    Anion Gap, Calculation 7 5 - 18 mmol/L    Glucose 84 70 - 125 mg/dL    Calcium 9.0 8.5 - 10.5 mg/dL    BUN 24 8 - 28 mg/dL    Creatinine 1.00 0.70 - 1.30 mg/dL    GFR MDRD Af Amer >60 >60 mL/min/1.73m2    GFR MDRD Non Af Amer >60 >60 mL/min/1.73m2     Lab Results   Component Value Date    WBC 16.4 (H) 06/29/2020    HGB 14.2 06/29/2020    HCT 41.4 06/29/2020    MCV 94 06/29/2020     06/29/2020     Lab Results   Component Value Date    TSH 0.87 08/14/2019     Vitamin B-12   Date Value Ref Range Status   08/14/2019 463 213 - 816 pg/mL Final     Vitamin D, Total (25-Hydroxy)   Date Value Ref Range Status   08/14/2019 9.5 (L) 30.0 - 80.0 ng/mL Final     Mag 1.9      Assessment/Plan:    New R nondisplaced subcapital hip fx suffered on 6/27/20: family wanted him kept in facility, so Lorin GEORGE from [facility] has guided tx. Now no pain associated, uses terrence for transfers    Pain control: continue tylenol 1000mg four times a day and lidocaine patch/gel. Per stability will decrease oxycodone 2.5mg to two times a day with 2.5mg q4h  PRN    Hypertension: previously losartan weaned off, more htn evident. Per monitoring, SBP <150    Dementia: continue seroquel 12.5mg two times a day, discontinue namenda    New leukocytosis: Last white count 16.4 on 6/29, possibly due to fracture, UA/UC negative, probably d/t fx    Hypokalemia: last K 3.7 on 6/29, discontinue potassium    Vit D def: continue D3 5000U daily, discontinued supplement    Normocytic anemia: last Hgb 14.2 on 6/29    Insomnia: melatonin discontinued, no issues reported    Constipation: Continue MiraLAX daily as needed     Disposition: Now resides in LTC. Ros lift with all transfers. WC bound. SLUMS 7/30.     Electronically signed by: Stewart Garcia NP     Assessment and Plan:     Patient has been advised of split billing requirements and indicates understanding: NA    The patient's current medical problems were reviewed.    I have had an Advance Directives discussion with the patient.  The following health maintenance schedule was reviewed with the patient and provided in printed form in the after visit summary:   Health Maintenance   Topic Date Due   ? TD 18+ HE  08/13/1951   ? ADVANCE CARE PLANNING  08/13/1951   ? ZOSTER VACCINES (1 of 2) 08/13/1983   ? Pneumococcal Vaccine: 65+ Years (1 of 1 - PPSV23) 08/13/1998   ? INFLUENZA VACCINE RULE BASED (1) 08/01/2020   ? MEDICARE ANNUAL WELLNESS VISIT  10/21/2021   ? FALL RISK ASSESSMENT  10/21/2021   ? Pneumococcal Vaccine: Pediatrics (0 to 5 Years) and At-Risk Patients (6 to 64 Years)  Aged Out   ? HEPATITIS B VACCINES  Aged Out        Subjective:   Chief Complaint: Wilian Vallejo is an 87 y.o. male here for an Annual Wellness visit.   HPI:  See above    Review of Systems:  Please see above.  .    Patient Care Team:  Stewart Garcia NP as PCP - General (Nurse Practitioner)  Stewart Garcia NP as Assigned PCP  [facility] Facility (Generic Provider)     PHYSICAL EXAM  See  above    Assessment Results 10/21/2020   Activities of Daily Living 5-6 - Severe functional impairment   Instrumental Activities of Daily Living 5-6 - Severe functional impairment   Get Up and Go Score 12 seconds or more   Mini Cog Total Score 1     A Mini-Cog score of 0-2 suggests the possibility of dementia, score of 3-5 suggests no dementia  Cognitive Screen not completed due to known dementia.  Fall Risk not completed for medical reasons.    Identified Health Risks:     He is at risk for lack of exercise and has been provided with information to increase physical activity for the benefit of his well-being.  The patient reports that he has difficulty with activities of daily living and lives in LTC in memory care.   The patient reports that he has difficulty with instrumental activities of daily living and lives in memory in LTC.  Patient's advanced directive was discussed and I am comfortable with the patient's wishes.

## 2021-06-21 NOTE — LETTER
Letter by Dinora Marin MBBS at      Author: Dinora Marin MBBS Service: -- Author Type: --    Filed:  Encounter Date: 11/18/2020 Status: (Other)         Patient: Wilian Vallejo   MR Number: [id number]   YOB: 1933   Date of Visit: 11/18/2020       [facility] Admission note      Patient: Wilian Vallejo  MRN: [id number]  Date of Service: 11/18/2020      [facility] [[id number]]  Reason for Visit     Chief Complaint   Patient presents with   ? Review Of Multiple Medical Conditions   Follow-up dementia    Code Status     DNR/DNI    Assessment     Progressive weight loss with failure to thrive now on hospice cares  Advanced dementia   History of hypertension now on a low-dose of losartan 25 mg daily; discontinued due to low blood pressures  Hypokalemia on supplementation  BPH  Debilitation generalized weakness  History of a hip fracture treated conservatively in the TCU  -History of recurrent falls    Plan     Patient is currently long-term care.  He has advanced dementia and is wheelchair-bound and remains a poor historian.  No pain concerns could be elicited from him because of confusion  He is a failure to thrive with progressive weight loss weights are down to 128 pounds down from 150 pounds recently.  Oral intake continues to be poor  Due to progressive decline cognitively and physically he has been on hospice cares.  Continues to have behaviors including resisting cares as well as being physically aggressive with staff members.  His mood and behaviors are being monitored and he is on hospice for support      History     Patient is a very pleasant 87 y.o. male who is a resident of LTC  Patient unfortunately has advanced dementia due to which she remains a poor historian at best he is alert to only himself.  Recall remains quite limited  He continues to have significant behaviors and remains resistive to care he gets paranoid and has been very difficult to deal with  as per staff he will refuse cares often and sometimes even refuse medications    He had a fall subsequently was diagnosed with nondisplaced subcapital hip fracture on 6/27/2020.  Family declined hospitalization.  He was in the nursing home and now has been moved to the TCU for aggressive rehab.  Progress is limited due to profound cognitive impairment and baseline debilitation  Pain management has been optimized using hospice support.  He is on oxycodone in addition Lidoderm patches are being used in his hip which has been effective  Oral intake has been variable with progressive weight loss reported  Weights are currently down to 126 pounds  Is almost a weight pound of 25 pounds recently he used to weigh almost 150 pounds.  He has been weaned off most of his other medications    Past Medical History     Active Ambulatory (Non-Hospital) Problems    Diagnosis   ? Hospice care   ? 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 Pain   ? Adjustment insomnia   ? Weakness   ? Alzheimer's dementia without behavioral disturbance (H)   ? Essential hypertension   ? Slow transit constipation     Past Medical History:   Diagnosis Date   ? BPH (benign prostatic hyperplasia)    ? Dementia    ? Essential hypertension    ? Inguinal hernia without obstruction or gangrene, recurrence not specified, unspecified laterality 04/02/2013   ? Mild cognitive impairment with memory loss    ? S/P TURP    ? Weight loss        Past Social History     Reviewed, and he  reports that he has never smoked. He has never used smokeless tobacco. He reports current alcohol use.    Family History     Reviewed, and family history includes Colon cancer in his maternal grandmother; Heart disease in his brother and mother; Hypertension in his mother.    Medication List     Current Outpatient Medications on File Prior to Visit   Medication Sig Dispense Refill   ? acetaminophen (TYLENOL) 500 MG tablet Take 1,000 mg by mouth 4 (four) times a day.     ? lidocaine  4 % patch Place 1 patch on the skin daily. Remove and discard patch with 12 hours. Apply to R hip     ? oxyCODONE (ROXICODONE) 5 MG immediate release tablet Take 1 tablet (5 mg total) by mouth 3 (three) times a day. And 5mg q4h PRN (Patient taking differently: Take 2.5 mg by mouth 2 (two) times a day. And 2.5mg q4h PRN) 30 tablet 0   ? polyethylene glycol (MIRALAX) 17 gram packet Take 17 g by mouth daily as needed (Give 17 gram by mouth as needed daily for constipation. Give by mouth or nasogastric tube).     ? QUEtiapine (SEROQUEL) 25 MG tablet Take 12.5 mg by mouth 2 (two) times a day. Give at 0800 and 1600       No current facility-administered medications on file prior to visit.    Losartan has been discontinued prior to discharge from the hospital    Allergies     No Known Allergies    Review of Systems   A comprehensive review of 14 systems was done. Pertinent findings noted here and in history of present illness. All the rest negative.  Constitutional: Negative.  Negative for fever, chills, he has activity change, appetite change and fatigue.   HENT: Negative for congestion and facial swelling.    Eyes: Negative for photophobia, redness and visual disturbance.   Respiratory: Negative for cough and chest tightness.    Cardiovascular: Negative for chest pain, palpitations and has NO significant leg swelling.   Gastrointestinal: Negative for nausea, diarrhea, constipation, blood in stool and abdominal distention.   Genitourinary: Negative.    Musculoskeletal: Negative.  Has difficulty walking does use a walker  Skin: Negative.    Neurological: Negative for dizziness, tremors, syncope, weakness, light-headedness and headaches.   Hematological: Does not bruise/bleed easily.   Psychiatric/Behavioral: Negative.  CONFused with limited recall      Physical Exam     Recent Vitals 10/21/2020   Height -   Weight 127 lbs   BSA (m2) 1.69 m2   /75   Pulse 61   Temp 97   SpO2 98   Some recent data might be hidden      GENERAL: no acute distress. Cooperative in conversation.   HEENT: pupils are equal, round and reactive. Oral mucosa is moist and intact.  RESP:Chest symmetric. Regular respiratory rate. No stridor.  ABD: Nondistended, soft.  EXTREMITIES: No lower extremity edema.   NEURO: non focal. Alert and oriented X SELF.   PSYCH: within normal limits. No depression or anxiety.  SKIN: warm dry intact , ecchymosis with skin tear of the right forearm noted         Lab Results     Results for orders placed or performed in visit on 09/21/20   Basic Metabolic Panel   Result Value Ref Range    Sodium 136 136 - 145 mmol/L    Potassium 4.1 3.5 - 5.0 mmol/L    Chloride 107 98 - 107 mmol/L    CO2 22 22 - 31 mmol/L    Anion Gap, Calculation 7 5 - 18 mmol/L    Glucose 84 70 - 125 mg/dL    Calcium 9.0 8.5 - 10.5 mg/dL    BUN 24 8 - 28 mg/dL    Creatinine 1.00 0.70 - 1.30 mg/dL    GFR MDRD Af Amer >60 >60 mL/min/1.73m2    GFR MDRD Non Af Amer >60 >60 mL/min/1.73m2       Recheck x-ray shows a nondisplaced right subcapital femur fracture with no displacement  SOLOMON Marshall             Detail Level: Detailed

## 2021-07-07 NOTE — PROGRESS NOTES
Code Status:  DNR/DNI  On [place] hospice  Visit Type: Review Of Multiple Medical Conditions (Dementia, BPH)     Facility:  [facility] [[id number]]        Facility Type:  (Long Term Care, LTC)    History of Present Illness: Wilian Vallejo is a 87 y.o. male with a past medical history for hypertension, Alzheimer's dementia, right hip pain after a right hip fracture without surgical intervention, BPH.    Patient has progressive dementia and has had a functional decline in the past 6 months.  He is on [place] hospice.  He has had an 8 pound weight loss in the past 6 months now at 128 pounds.  He does hang his head and has difficulty raising it up to look at me in the eyes today due to muscle weakness.    Blood pressures for the last month have been trending up with systolics in the 140s-190s.  He denies any pain or discomfort.5    Review of Systems   Difficult to obtain due to dementia.     Physical Exam   Vitals:    06/28/21 0953   BP: 154/90   Pulse: 70   Resp: 18   Temp: 97.5  F (36.4  C)   SpO2: 96%     GENERAL APPEARANCE: thin, elderly male in no acute distress.  HEENT: normocephalic, atraumatic  sclerae anicteric, conjunctivae clear and moist, EOM intact  LUNGS: Lung sounds CTA, no adventitious sounds, respiratory effort normal.  CARD: RRR, S1, S2, without murmurs, gallops, rubs  ABD: Soft and nontender with normal bowel sounds.   MSK: Hangs head and has difficulty lifting his head.  EXTREMITIES: No cyanosis, clubbing or edema.  NEURO: Alert with cognitive impairment. Face is symmetric.  SKIN: Inspection of the skin reveals no rashes, ulcerations or petechiae.  PSYCH: euthymic, sleepy during exam      Labs:  No results found for this or any previous visit (from the past 240 hour(s)).        Assessment:  1. Essential hypertension     2. Alzheimer's dementia without behavioral disturbance, unspecified timing of dementia onset (H)     3. Adjustment insomnia     4. Routine general medical  examination at a health care facility     5. Pain         Plan:   Hypertension: Was on losartan in the past however this was discontinued due to low blood pressures.  Will start lisinopril small dose at 5 mg.  Blood pressure goal is liberal and would be okay with systolics in the 140s and 150s.    Dementia: Progressively declining with physical and functional changes.  Weight loss.  Is on a nutritional supplement and hospice at this time.    Insomnia: Sleeping well, on Seroquel 3 times daily likely causing some daytime sleepiness we will continue to monitor.    Pain: Controlled with oxycodone, APAP and lidocaine      Electronically signed by: Savi Ventura, CNP

## 2021-07-07 NOTE — LETTER
Letter by Savi Ventura CNP at      Author: Savi Ventura CNP Service: -- Author Type: --    Filed:  Encounter Date: 6/28/2021 Status: (Other)         [facility]  [address] [place] [id number]                                  June 28, 2021    Patient: Wilian Vallejo   MR Number: [id number]   YOB: 1933   Date of Visit: 6/28/2021     Dear Dr. Home:    Thank you for referring Wilian Vallejo to me for evaluation. Below are the relevant portions of my assessment and plan of care.    If you have questions, please do not hesitate to call me. I look forward to following Wilian along with you.    Sincerely,        Savi Ventura CNP          CC  No Recipients  Savi Ventura CNP  6/28/2021  2:32 PM  Signed  Code Status:  DNR/DNI  On [place] hospice  Visit Type: Review Of Multiple Medical Conditions (Dementia, BPH)     Facility:  [facility] [[id number]]        Facility Type:  (Long Term Care, LTC)    History of Present Illness: Wilian Vallejo is a 87 y.o. male with a past medical history for hypertension, Alzheimer's dementia, right hip pain after a right hip fracture without surgical intervention, BPH.    Patient has progressive dementia and has had a functional decline in the past 6 months.  He is on [place] hospice.  He has had an 8 pound weight loss in the past 6 months now at 128 pounds.  He does hang his head and has difficulty raising it up to look at me in the eyes today due to muscle weakness.    Blood pressures for the last month have been trending up with systolics in the 140s-190s.  He denies any pain or discomfort.5    Review of Systems   Difficult to obtain due to dementia.     Physical Exam   Vitals:    06/28/21 0953   BP: 154/90   Pulse: 70   Resp: 18   Temp: 97.5  F (36.4  C)   SpO2: 96%     GENERAL APPEARANCE: thin, elderly male in no acute distress.  HEENT: normocephalic, atraumatic  sclerae anicteric,  conjunctivae clear and moist, EOM intact  LUNGS: Lung sounds CTA, no adventitious sounds, respiratory effort normal.  CARD: RRR, S1, S2, without murmurs, gallops, rubs  ABD: Soft and nontender with normal bowel sounds.   MSK: Hangs head and has difficulty lifting his head.  EXTREMITIES: No cyanosis, clubbing or edema.  NEURO: Alert with cognitive impairment. Face is symmetric.  SKIN: Inspection of the skin reveals no rashes, ulcerations or petechiae.  PSYCH: euthymic, sleepy during exam      Labs:  No results found for this or any previous visit (from the past 240 hour(s)).        Assessment:  1. Essential hypertension     2. Alzheimer's dementia without behavioral disturbance, unspecified timing of dementia onset (H)     3. Adjustment insomnia     4. Routine general medical examination at a health care facility     5. Pain         Plan:   Hypertension: Was on losartan in the past however this was discontinued due to low blood pressures.  Will start lisinopril small dose at 5 mg.  Blood pressure goal is liberal and would be okay with systolics in the 140s and 150s.    Dementia: Progressively declining with physical and functional changes.  Weight loss.  Is on a nutritional supplement and hospice at this time.    Insomnia: Sleeping well, on Seroquel 3 times daily likely causing some daytime sleepiness we will continue to monitor.    Pain: Controlled with oxycodone, APAP and lidocaine      Electronically signed by: Savi Ventura CNP

## 2021-07-14 NOTE — TELEPHONE ENCOUNTER
[facility] Nurse Triage Telephone Note    Provider: ANDRES Covarrubias  Facility: [facility] Facility Type:  [facility]    Caller: Roxana  Call Back Number: [phone]    Allergies:    Allergies   Allergen Reactions     Hydrocodone W/Acetaminophen [Hydrocodone-Acetaminophen]         Reason for call: Nurse calling to report that patient had two episodes of loose stools during the night shift and 2 episodes of loose stools on the day shift.  Patient is afebrile.  He is not receiving any bowel meds.  No nausea or emesis noted.  Nurse also reporting two skin tears---1 between the left thumb and index finger and 1 on the left elbow.  Both skin tears were dressed.      Verbal Order/Direction given by Provider: Continue to monitor stools.  Update tomorrow if patient is still having loose stools.      Provider giving Order:  ANDRES Covarrubias    Verbal Order given to: Roxana Puente RN

## 2021-08-11 NOTE — PROGRESS NOTES
[facility] Admission note      Patient: Wilian Vallejo  MRN: [id number]  Date of Service: 4/14/2021      [facility] [[id number]]  Reason for Visit     Chief Complaint   Patient presents with     Review Of Multiple Medical Conditions   Follow-up dementia    Code Status     DNR/DNI    Assessment     Progressive weight loss with failure to thrive now on hospice cares  Advanced dementia   History of hypertension   BPH  Debilitation generalized weakness  History of a hip fracture treated conservatively in the TCU  -History of recurrent falls    Plan     Patient is currently long-term care.  Patient has progressive dementia and essentially is on hospice support.  Care is supportive.  Seroquel has been added for control of behaviors.  Blood pressures are being monitored he was restarted on lisinopril due to elevated blood pressures.  Blood pressure review done and he has very high elevated blood pressures today but prior to that his blood pressures were in the 120s systolic.  Prior to this his antihypertensives were discontinued due to hypotension we will continue to monitor before adjusting any further  Continues to have progressive weight loss now down to 127 pounds    History     Patient is a very pleasant 87 y.o. male who is a resident of LT  Patient unfortunately has advanced dementia   He is a poor historian.  He has been switched onto hospice cares    He had a fall subsequently was diagnosed with nondisplaced subcapital hip fracture on 6/27/2020.  Family declined hospitalization.     Oral intake remains erratic and some days he does not eat other days he eats less than 50% of the offered meal  This is reflected in progressive weight loss currently his weight is further down.  He has no new concerns about pain but overall is a poor historian to    Past Medical History     Active Ambulatory (Non-Hospital) Problems    Diagnosis     Hospice care     Closed fracture of right hip with  nonunion     Pain     Adjustment insomnia     Weakness     Alzheimer's dementia without behavioral disturbance (H)     Essential hypertension     Slow transit constipation     Past Medical History:   Diagnosis Date     BPH (benign prostatic hyperplasia)      Dementia      Essential hypertension      Inguinal hernia without obstruction or gangrene, recurrence not specified, unspecified laterality 04/02/2013     Mild cognitive impairment with memory loss      S/P TURP      Weight loss        Past Social History     Reviewed, and he  reports that he has never smoked. He has never used smokeless tobacco. He reports current alcohol use.    Family History     Reviewed, and family history includes Colon cancer in his maternal grandmother; Heart disease in his brother and mother; Hypertension in his mother.    Medication List     Current Outpatient Medications on File Prior to Visit   Medication Sig Dispense Refill     acetaminophen (TYLENOL) 500 MG tablet Take 1,000 mg by mouth 4 (four) times a day.       lidocaine 4 % patch Place 1 patch on the skin daily. Remove and discard patch with 12 hours. Apply to R hip       oxyCODONE (ROXICODONE) 5 MG immediate release tablet Take 1 tablet (5 mg total) by mouth 3 (three) times a day. And 5mg q4h PRN (Patient taking differently: Take 2.5 mg by mouth 2 (two) times a day. And 2.5mg q4h PRN) 30 tablet 0     polyethylene glycol (MIRALAX) 17 gram packet Take 17 g by mouth daily as needed (Give 17 gram by mouth as needed daily for constipation. Give by mouth or nasogastric tube).       QUEtiapine (SEROQUEL) 25 MG tablet Take 12.5 mg by mouth 2 (two) times a day. Give at 0800 and 1600       No current facility-administered medications on file prior to visit.    Losartan has been discontinued prior to discharge from the hospital    Allergies     No Known Allergies    Review of Systems   A comprehensive review of 14 systems was done. Pertinent findings noted here and in history of present  "illness. All the rest negative.  Constitutional: Negative.  Negative for fever, chills, he has activity change, appetite change and fatigue.   HENT: Negative for congestion and facial swelling.    Eyes: Negative for photophobia, redness and visual disturbance.   Respiratory: Negative for cough and chest tightness.    Cardiovascular: Negative for chest pain, palpitations and has NO significant leg swelling.   Gastrointestinal: Negative for nausea, diarrhea, constipation, blood in stool and abdominal distention.   Genitourinary: Negative.    Musculoskeletal: Negative.  Has difficulty walking does use a walker  Skin: Negative.    Neurological: Negative for dizziness, tremors, syncope, weakness, light-headedness and headaches.   Hematological: Does not bruise/bleed easily.   Psychiatric/Behavioral: Negative.  CONFused with limited recall      Physical Exam     Recent Vitals 1/27/2021   Height -   Weight 136 lbs   BSA (m2) 1.75 m2   /78   Pulse 65   Temp 97   SpO2 98   Some recent data might be hidden   BP (!) 166/107   Pulse 86   Temp 97.3  F (36.3  C)   Resp 18   Ht 1.778 m (5' 10\")   Wt 57.3 kg (126 lb 6.4 oz)   SpO2 98%   BMI 18.14 kg/m    GENERAL: no acute distress. Cooperative in conversation.   HEENT: pupils are equal, round and reactive. Oral mucosa is moist and intact.  RESP:Chest symmetric. Regular respiratory rate. No stridor.  ABD: Nondistended, soft.  EXTREMITIES: No lower extremity edema.   NEURO: non focal. Alert and oriented X SELF.   PSYCH: within normal limits. No depression or anxiety.  SKIN: warm dry intact ,          Lab Results     Results for orders placed or performed in visit on 09/21/20   Basic Metabolic Panel   Result Value Ref Range    Sodium 136 136 - 145 mmol/L    Potassium 4.1 3.5 - 5.0 mmol/L    Chloride 107 98 - 107 mmol/L    CO2 22 22 - 31 mmol/L    Anion Gap, Calculation 7 5 - 18 mmol/L    Glucose 84 70 - 125 mg/dL    Calcium 9.0 8.5 - 10.5 mg/dL    BUN 24 8 - 28 mg/dL    " Creatinine 1.00 0.70 - 1.30 mg/dL    GFR MDRD Af Amer >60 >60 mL/min/1.73m2    GFR MDRD Non Af Amer >60 >60 mL/min/1.73m2         SOLOMON Marshall

## 2021-08-11 NOTE — LETTER
8/11/2021        RE: Wilian Vallejo  Co Miguelito Vallejo  [address]  [place] [address]          [facility] Admission note      Patient: Wilian Vallejo  MRN: [id number]  Date of Service: 4/14/2021      [facility] [[id number]]  Reason for Visit     Chief Complaint   Patient presents with     Review Of Multiple Medical Conditions   Follow-up dementia    Code Status     DNR/DNI    Assessment     Progressive weight loss with failure to thrive now on hospice cares  Advanced dementia   History of hypertension   BPH  Debilitation generalized weakness  History of a hip fracture treated conservatively in the TCU  -History of recurrent falls    Plan     Patient is currently long-term care.  Patient has progressive dementia and essentially is on hospice support.  Care is supportive.  Seroquel has been added for control of behaviors.  Blood pressures are being monitored he was restarted on lisinopril due to elevated blood pressures.  Blood pressure review done and he has very high elevated blood pressures today but prior to that his blood pressures were in the 120s systolic.  Prior to this his antihypertensives were discontinued due to hypotension we will continue to monitor before adjusting any further  Continues to have progressive weight loss now down to 127 pounds    History     Patient is a very pleasant 87 y.o. male who is a resident of LTC  Patient unfortunately has advanced dementia   He is a poor historian.  He has been switched onto hospice cares    He had a fall subsequently was diagnosed with nondisplaced subcapital hip fracture on 6/27/2020.  Family declined hospitalization.     Oral intake remains erratic and some days he does not eat other days he eats less than 50% of the offered meal  This is reflected in progressive weight loss currently his weight is further down.  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 He has never used smokeless tobacco. He reports current alcohol use.    Family History     Reviewed, and family history includes Colon cancer in his maternal grandmother; Heart disease in his brother and mother; Hypertension in his mother.    Medication List     Current Outpatient Medications on File Prior to Visit   Medication Sig Dispense Refill     acetaminophen (TYLENOL) 500 MG tablet Take 1,000 mg by mouth 4 (four) times a day.       lidocaine 4 % patch Place 1 patch on the skin daily. Remove and discard patch with 12 hours. Apply to R hip       oxyCODONE (ROXICODONE) 5 MG immediate release tablet Take 1 tablet (5 mg total) by mouth 3 (three) times a day. And 5mg q4h PRN (Patient taking differently: Take 2.5 mg by mouth 2 (two) times a day. And 2.5mg q4h PRN) 30 tablet 0     polyethylene glycol (MIRALAX) 17 gram packet Take 17 g by mouth daily as needed (Give 17 gram by mouth as needed daily for constipation. Give by mouth or nasogastric tube).       QUEtiapine (SEROQUEL) 25 MG tablet Take 12.5 mg by mouth 2 (two) times a day. Give at 0800 and 1600       No current facility-administered medications on file prior to visit.    Losartan has been discontinued prior to discharge from the hospital    Allergies     No Known Allergies    Review of  "Systems   A comprehensive review of 14 systems was done. Pertinent findings noted here and in history of present illness. All the rest negative.  Constitutional: Negative.  Negative for fever, chills, he has activity change, appetite change and fatigue.   HENT: Negative for congestion and facial swelling.    Eyes: Negative for photophobia, redness and visual disturbance.   Respiratory: Negative for cough and chest tightness.    Cardiovascular: Negative for chest pain, palpitations and has NO significant leg swelling.   Gastrointestinal: Negative for nausea, diarrhea, constipation, blood in stool and abdominal distention.   Genitourinary: Negative.    Musculoskeletal: Negative.  Has difficulty walking does use a walker  Skin: Negative.    Neurological: Negative for dizziness, tremors, syncope, weakness, light-headedness and headaches.   Hematological: Does not bruise/bleed easily.   Psychiatric/Behavioral: Negative.  CONFused with limited recall      Physical Exam     Recent Vitals 1/27/2021   Height -   Weight 136 lbs   BSA (m2) 1.75 m2   /78   Pulse 65   Temp 97   SpO2 98   Some recent data might be hidden   BP (!) 166/107   Pulse 86   Temp 97.3  F (36.3  C)   Resp 18   Ht 1.778 m (5' 10\")   Wt 57.3 kg (126 lb 6.4 oz)   SpO2 98%   BMI 18.14 kg/m    GENERAL: no acute distress. Cooperative in conversation.   HEENT: pupils are equal, round and reactive. Oral mucosa is moist and intact.  RESP:Chest symmetric. Regular respiratory rate. No stridor.  ABD: Nondistended, soft.  EXTREMITIES: No lower extremity edema.   NEURO: non focal. Alert and oriented X SELF.   PSYCH: within normal limits. No depression or anxiety.  SKIN: warm dry intact ,          Lab Results     Results for orders placed or performed in visit on 09/21/20   Basic Metabolic Panel   Result Value Ref Range    Sodium 136 136 - 145 mmol/L    Potassium 4.1 3.5 - 5.0 mmol/L    Chloride 107 98 - 107 mmol/L    CO2 22 22 - 31 mmol/L    Anion Gap, " Calculation 7 5 - 18 mmol/L    Glucose 84 70 - 125 mg/dL    Calcium 9.0 8.5 - 10.5 mg/dL    BUN 24 8 - 28 mg/dL    Creatinine 1.00 0.70 - 1.30 mg/dL    GFR MDRD Af Amer >60 >60 mL/min/1.73m2    GFR MDRD Non Af Amer >60 >60 mL/min/1.73m2         SOLOMON Marshall           Sincerely,        SOLOMON Marshall

## 2021-10-18 NOTE — PROGRESS NOTES
"Code Status:  DNR/DNI  On [place] hospice  Visit Type: Annual Comprehensive Nursing home     Facility:  [facility] [[id number]]          History of Present Illness: Wilian Vallejo is a 87 y.o. male with a past medical history for hypertension, Alzheimer's dementia, right hip pain after a right hip fracture without surgical intervention, BPH.    Nursing reports ongoing functional decline with increased sleepiness during the day.  Today, I see him sitting in his chair and he does wake up to voice.  He denies any pain or discomfort.  Periodically his blood pressures have gone 200s over 100s however he is comfort cares and managed by hospice.  All of his blood pressure medications have been discontinued in the past due to hypotension.  He continues to gradually lose weight.    Current Outpatient Medications   Medication     acetaminophen (TYLENOL) 500 MG tablet     bisacodyl (DULCOLAX) 10 MG suppository     Lidocaine (LIDOCARE) 4 % Patch     Lidocaine 4 % GEL     oxyCODONE (ROXICODONE) 5 MG tablet     oxyCODONE (ROXICODONE) 5 MG tablet     polyethylene glycol (MIRALAX) 17 g packet     QUEtiapine (SEROQUEL) 25 MG tablet     No current facility-administered medications for this visit.           Review of Systems   Difficult to obtain due to dementia.     Physical Exam   Vital signs:BP (!) 204/126   Pulse 61   Temp 97.1  F (36.2  C)   Resp 18   Ht 1.778 m (5' 10\")   Wt 54.9 kg (121 lb)   SpO2 96%   BMI 17.36 kg/m     GENERAL APPEARANCE: thin, elderly male in no acute distress.  HEENT: normocephalic, atraumatic   sclerae anicteric, conjunctivae clear and moist, EOM intact  LUNGS: Lung sounds CTA, no adventitious sounds, respiratory effort normal.  CARD: RRR, S1, S2, without murmurs, gallops, rubs  ABD: Soft, nondistended and nontender   EXTREMITIES: No cyanosis, clubbing or edema.  NEURO: Alert to voice.   Face is symmetric.  SKIN: Inspection of the skin reveals no rashes, ulcerations or " petechiae.  PSYCH: euthymic    Labs: Comfort focused cares not drawing labs at this time..        Assessment/PLan:  Essential hypertension  Family wants comfort measures cares and will not add any blood pressure medications at this time.  Discontinue blood pressure checks.    Alzheimer's dementia without behavioral disturbance, unspecified timing of dementia onset (H)  Aggressive decline with decreased functional changes.  Does have ongoing weight loss.  Hospice managing overall care.    Hospice care  Pain is well controlled with current regiment continue with same plan of care.  I will make no changes.    Depression, unspecified depression type  On no other medications for depression however is on Seroquel for psychosis.  Mood appears to be stable.      Electronically signed by: Savi Ventura, CNP

## 2021-10-18 NOTE — LETTER
"    10/18/2021        RE: Wilian Vallejo  C/o Miguelito Vallejo  [address] [address]        Code Status:  DNR/DNI  On [facility] hospice  Visit Type: Annual Comprehensive Nursing home     Facility:  [facility] [[id number]]          History of Present Illness: Wilian Vallejo is a 87 y.o. male with a past medical history for hypertension, Alzheimer's dementia, right hip pain after a right hip fracture without surgical intervention, BPH.    Nursing reports ongoing functional decline with increased sleepiness during the day.  Today, I see him sitting in his chair and he does wake up to voice.  He denies any pain or discomfort.  Periodically his blood pressures have gone 200s over 100s however he is comfort cares and managed by hospice.  All of his blood pressure medications have been discontinued in the past due to hypotension.  He continues to gradually lose weight.    Current Outpatient Medications   Medication     acetaminophen (TYLENOL) 500 MG tablet     bisacodyl (DULCOLAX) 10 MG suppository     Lidocaine (LIDOCARE) 4 % Patch     Lidocaine 4 % GEL     oxyCODONE (ROXICODONE) 5 MG tablet     oxyCODONE (ROXICODONE) 5 MG tablet     polyethylene glycol (MIRALAX) 17 g packet     QUEtiapine (SEROQUEL) 25 MG tablet     No current facility-administered medications for this visit.           Review of Systems   Difficult to obtain due to dementia.     Physical Exam   Vital signs:BP (!) 204/126   Pulse 61   Temp 97.1  F (36.2  C)   Resp 18   Ht 1.778 m (5' 10\")   Wt 54.9 kg (121 lb)   SpO2 96%   BMI 17.36 kg/m     GENERAL APPEARANCE: thin, elderly male in no acute distress.  HEENT: normocephalic, atraumatic   sclerae anicteric, conjunctivae clear and moist, EOM intact  LUNGS: Lung sounds CTA, no adventitious sounds, respiratory effort normal.  CARD: RRR, S1, S2, without murmurs, gallops, rubs  ABD: Soft, nondistended and nontender   EXTREMITIES: No cyanosis, clubbing or " edema.  NEURO: Alert to voice.   Face is symmetric.  SKIN: Inspection of the skin reveals no rashes, ulcerations or petechiae.  PSYCH: euthymic    Labs: Comfort focused cares not drawing labs at this time..        Assessment/PLan:  Essential hypertension  Family wants comfort measures cares and will not add any blood pressure medications at this time.  Discontinue blood pressure checks.    Alzheimer's dementia without behavioral disturbance, unspecified timing of dementia onset (H)  Aggressive decline with decreased functional changes.  Does have ongoing weight loss.  Hospice managing overall care.    Hospice care  Pain is well controlled with current regiment continue with same plan of care.  I will make no changes.    Depression, unspecified depression type  On no other medications for depression however is on Seroquel for psychosis.  Mood appears to be stable.      Electronically signed by: Savi Ventura CNP                     Sincerely,        Savi Ventura NP
